# Patient Record
Sex: FEMALE | Race: WHITE | Employment: OTHER | URBAN - METROPOLITAN AREA
[De-identification: names, ages, dates, MRNs, and addresses within clinical notes are randomized per-mention and may not be internally consistent; named-entity substitution may affect disease eponyms.]

---

## 2017-01-04 ENCOUNTER — TRANSFERRED RECORDS (OUTPATIENT)
Dept: HEALTH INFORMATION MANAGEMENT | Facility: CLINIC | Age: 72
End: 2017-01-04

## 2017-02-02 ENCOUNTER — OFFICE VISIT (OUTPATIENT)
Dept: INTERNAL MEDICINE | Facility: CLINIC | Age: 72
End: 2017-02-02
Payer: COMMERCIAL

## 2017-02-02 VITALS
WEIGHT: 186 LBS | BODY MASS INDEX: 28.29 KG/M2 | SYSTOLIC BLOOD PRESSURE: 130 MMHG | TEMPERATURE: 97.5 F | OXYGEN SATURATION: 97 % | DIASTOLIC BLOOD PRESSURE: 80 MMHG | HEART RATE: 77 BPM

## 2017-02-02 DIAGNOSIS — R53.83 FATIGUE, UNSPECIFIED TYPE: ICD-10-CM

## 2017-02-02 DIAGNOSIS — R79.89 ELEVATED FERRITIN: ICD-10-CM

## 2017-02-02 DIAGNOSIS — Z01.818 PREOP GENERAL PHYSICAL EXAM: Primary | ICD-10-CM

## 2017-02-02 DIAGNOSIS — E55.9 VITAMIN D DEFICIENCY: ICD-10-CM

## 2017-02-02 LAB
ANION GAP SERPL CALCULATED.3IONS-SCNC: 6 MMOL/L (ref 3–14)
BUN SERPL-MCNC: 12 MG/DL (ref 7–30)
CALCIUM SERPL-MCNC: 9.6 MG/DL (ref 8.5–10.1)
CHLORIDE SERPL-SCNC: 104 MMOL/L (ref 94–109)
CO2 SERPL-SCNC: 32 MMOL/L (ref 20–32)
CREAT SERPL-MCNC: 0.74 MG/DL (ref 0.52–1.04)
ERYTHROCYTE [DISTWIDTH] IN BLOOD BY AUTOMATED COUNT: 13.3 % (ref 10–15)
FERRITIN SERPL-MCNC: 355 NG/ML (ref 8–252)
GFR SERPL CREATININE-BSD FRML MDRD: 78 ML/MIN/1.7M2
GLUCOSE SERPL-MCNC: 93 MG/DL (ref 70–99)
HCT VFR BLD AUTO: 40.4 % (ref 35–47)
HGB BLD-MCNC: 13.1 G/DL (ref 11.7–15.7)
IRON SATN MFR SERPL: 46 % (ref 15–46)
IRON SERPL-MCNC: 122 UG/DL (ref 35–180)
MCH RBC QN AUTO: 30.9 PG (ref 26.5–33)
MCHC RBC AUTO-ENTMCNC: 32.4 G/DL (ref 31.5–36.5)
MCV RBC AUTO: 95 FL (ref 78–100)
PLATELET # BLD AUTO: 205 10E9/L (ref 150–450)
POTASSIUM SERPL-SCNC: 3.9 MMOL/L (ref 3.4–5.3)
RBC # BLD AUTO: 4.24 10E12/L (ref 3.8–5.2)
SODIUM SERPL-SCNC: 142 MMOL/L (ref 133–144)
TIBC SERPL-MCNC: 265 UG/DL (ref 240–430)
WBC # BLD AUTO: 3.6 10E9/L (ref 4–11)

## 2017-02-02 PROCEDURE — 99214 OFFICE O/P EST MOD 30 MIN: CPT | Performed by: INTERNAL MEDICINE

## 2017-02-02 PROCEDURE — 85027 COMPLETE CBC AUTOMATED: CPT | Performed by: INTERNAL MEDICINE

## 2017-02-02 PROCEDURE — 82306 VITAMIN D 25 HYDROXY: CPT | Performed by: INTERNAL MEDICINE

## 2017-02-02 PROCEDURE — 93000 ELECTROCARDIOGRAM COMPLETE: CPT | Performed by: INTERNAL MEDICINE

## 2017-02-02 PROCEDURE — 82728 ASSAY OF FERRITIN: CPT | Performed by: INTERNAL MEDICINE

## 2017-02-02 PROCEDURE — 36415 COLL VENOUS BLD VENIPUNCTURE: CPT | Performed by: INTERNAL MEDICINE

## 2017-02-02 PROCEDURE — 83540 ASSAY OF IRON: CPT | Performed by: INTERNAL MEDICINE

## 2017-02-02 PROCEDURE — 83550 IRON BINDING TEST: CPT | Performed by: INTERNAL MEDICINE

## 2017-02-02 PROCEDURE — 80048 BASIC METABOLIC PNL TOTAL CA: CPT | Performed by: INTERNAL MEDICINE

## 2017-02-02 NOTE — PROGRESS NOTES
[address] [address]  [phone]  Dept: [phone]    PRE-OP EVALUATION:  Today's date: 2017    Justine Michelle (: 1945) presents for pre-operative evaluation assessment as requested by Dr. Pereyra.  She requires evaluation and anesthesia risk assessment prior to undergoing surgery/procedure for treatment of Brachial Plasty and blepharoplasty.  Proposed procedure: Brachial Plasty and Upper Eye lid Lift    Date of Surgery/ Procedure: 2017  Time of Surgery/ Procedure: 7am  Hospital/Surgical Facility: [facility]  Fax number for surgical facility: [phone]  Primary Physician: Adriana Hernandez  Type of Anesthesia Anticipated: to be determined    Patient has a Health Care Directive or Living Will:  NO    1. NO - Do you have a history of heart attack, stroke, stent, bypass or surgery on an artery in the head, neck, heart or legs?  2. NO - Do you ever have any pain or discomfort in your chest?  3. NO - Do you have a history of  Heart Failure?  4. NO - Are you troubled by shortness of breath when: walking on the level, up a slight hill or at night?  5. NO - Do you currently have a cold, bronchitis or other respiratory infection?  6. NO - Do you have a cough, shortness of breath or wheezing?  7. NO - Do you sometimes get pains in the calves of your legs when you walk?  8. NO - Do you or anyone in your family have previous history of blood clots?  9. NO - Do you or does anyone in your family have a serious bleeding problem such as prolonged bleeding following surgeries or cuts?  10. NO - Have you ever had problems with anemia or been told to take iron pills?  11. NO - Have you had any abnormal blood loss such as black, tarry or bloody stools, or abnormal vaginal bleeding?  12. NO - Have you ever had a blood transfusion?  13. NO - Have you or any of your relatives ever had problems with anesthesia?  14. NO - Do you have  sleep apnea, excessive snoring or daytime drowsiness?  15. NO - Do you have any prosthetic heart valves?  16. NO - Do you have prosthetic joints?  17. NO - Is there any chance that you may be pregnant?      HPI:                                                      Brief HPI related to upcoming procedure: Justine is undergoing surgery for removal of excess skin as a result of weight loss and also for blepharoplasty. She has done very well with weight loss losing over 70 pounds within the last 1-2 years.  She denies any current concerns.      See problem list for active medical problems.  Problems all longstanding and stable, except as noted/documented.  See ROS for pertinent symptoms related to these conditions.                                                                                                  .    MEDICAL HISTORY:                                                      Patient Active Problem List    Diagnosis Date Noted     Fatigue, unspecified type 11/07/2016     Priority: Medium     Anemia, unspecified type 11/07/2016     Priority: Medium     RECENT EPISODE OF DIVERTICULITIS       Numerous moles 08/04/2016     Priority: Medium     Myalgia  01/07/2016     Priority: Medium     Restless leg syndrome 01/07/2016     Priority: Medium     Overweight (BMI 25.0-29.9) 01/07/2016     Priority: Medium     Adult BMI 28.0-28.9 kg/sq m 10/01/2015     Priority: Medium     Pain in joint, pelvic region and thigh 04/01/2015     Priority: Medium     Advanced directives, counseling/discussion 01/20/2015     Priority: Medium     Advance Care Planning:   ACP Review and Resources Provided:  Reviewed chart for advance care plan.  Justine Michelle has no plan or code status on file. Discussed available resources and provided with information. Confirmed code status reflects current choices pending further ACP discussions.  Confirmed/documented designated decision maker(s). See permanent comments section of demographics in  clinical tab.   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 Provider to review and confirm.       Vitamin B12 deficiency without anemia 10/28/2014     Priority: Medium     Diagnosis updated by automated process. Provider to review and confirm.       Senile cataract 10/28/2014     Priority: Medium     Elevated ferritin level 10/28/2014     Priority: Medium     Vitamin D deficiency 10/28/2014     Priority: Medium     Palpitations 06/03/2014     Priority: Medium     Acute abdominal pain 04/16/2013     Priority: Medium     Diverticulitis of colon 04/16/2013     Priority: Medium     Myalgia and myositis 09/22/2006     Priority: Medium     Problem list name updated by automated process. Provider to review       Diverticulosis of large intestine      Priority: Medium     colonoscopy due in 2013  Problem list name updated by automated process. Provider to review       Absence of menstruation      Priority: Medium     not on HRT        Past Medical History   Diagnosis Date     Myalgia and myositis, unspecified      Absence of menstruation 2006     not on HRT     Diverticulosis of colon (without mention of hemorrhage)      colonoscopy due in 2013     Obesity, unspecified      Other and unspecified hyperlipidemia      Acute abdominal pain 4/16/2013     Diverticulitis of colon 4/16/2013     also 2003, 2007     Palpitations      Past Surgical History   Procedure Laterality Date     Appendectomy       Tubal ligation       Cholecystectomy, laporoscopic       Cholecystectomy, Laparoscopic     C nonspecific procedure  2006     nl     C nonspecific procedure  2005     nl     Hc colonoscopy thru stoma, diagnostic  2000     nl     Current Outpatient Prescriptions   Medication Sig Dispense Refill     B  Complex-C (SUPER B COMPLEX PO) Take by mouth daily       MAGNESIUM OXIDE PO Take 400 mg by mouth       cholecalciferol (VITAMIN D3) 28852 UNITS capsule Take 1 capsule (50,000 Units) by mouth every 14 days INDICATION:TAKE ONE CAP EVERY OTHER WEEK, FOR VITAMIN D SUPPLEMENTATION (1ST AND 15TH OF EACH MONTH), TAKE ONCE A MONTH DURING SUMMER MONTHS ONLY, RESTART TWICE MONTHLY DOSING IN THE FALL 40 capsule 0     phentermine 30 MG capsule Take 1 capsule (30 mg) by mouth every morning 90 capsule 0     FIBER PO        Cyanocobalamin (B-12 SUPER STRENGTH) 5000 MCG/ML LIQD Place 0.5 mLs under the tongue daily FOR VITAMIN B12 SUPPLEMENTATION, PLEASE PLACE UNDER THE TONGUE 2 Bottle PRN     calcium carbonate (TUMS CALCIUM FOR LIFE BONE) 750 MG CHEW Take 1 tablet (750 mg) by mouth 2 times daily (with meals) (Patient taking differently: Take 1,500 mg by mouth 2 times daily (with meals) ) 90 tablet      Psyllium (METAMUCIL PO) Take 1 tsp. by mouth daily       OTC products: None, except as noted above and no recent use of OTC ASA, NSAIDS or Steroids    Allergies   Allergen Reactions     Augmentin Rash     Bees      hives      Latex Allergy: NO    Social History   Substance Use Topics     Smoking status: Never Smoker      Smokeless tobacco: Never Used     Alcohol Use: Yes      Comment: minimal - on holidays     History   Drug Use No       REVIEW OF SYSTEMS:                                                    Constitutional, neuro, ENT, endocrine, pulmonary, cardiac, gastrointestinal, genitourinary, musculoskeletal, integument and psychiatric systems are negative, except as otherwise noted.    EXAM:                                                    /80 mmHg  Pulse 77  Temp(Src) 97.5  F (36.4  C) (Oral)  Wt 186 lb (84.369 kg)  SpO2 97%    GENERAL APPEARANCE: healthy, alert and no distress     EYES: EOMI,- PERRL     HENT: ear canals and TM's normal and nose and mouth without ulcers or lesions     NECK: no adenopathy, no  asymmetry, masses, or scars and thyroid normal to palpation     RESP: lungs clear to auscultation - no rales, rhonchi or wheezes     CV: regular rates and rhythm, normal S1 S2, no S3 or S4 and no murmur, click or rub -     ABDOMEN:  soft, nontender, no HSM or masses and bowel sounds normal     MS: extremities normal- no gross deformities noted, no evidence of inflammation in joints, FROM in all extremities.     SKIN: no suspicious lesions or rashes     NEURO: Normal strength and tone, sensory exam grossly normal, mentation intact and speech normal     PSYCH: mentation appears normal. and affect normal/bright     LYMPHATICS: No axillary, cervical, inguinal, or supraclavicular nodes    DIAGNOSTICS:                                                      EKG: appears normal, NSR, normal axis, normal intervals, no acute ST/T changes c/w ischemia, no LVH by voltage criteria, unchanged from previous tracings  Serum Potassium  Serum Creatinine  Labs Drawn and in Process:   Unresulted Labs Ordered in the Past 30 Days of this Admission     No orders found from 12/5/2016 to 2/3/2017.          Recent Labs   Lab Test  11/07/16   1211  10/04/16   1552   05/02/16   0912  05/02/14   0859   HGB  13.0  11.6*   < >  12.3   --    PLT  181  227   < >  191   --    NA   --    --    --   142  143   POTASSIUM   --    --    --   4.0  3.8   CR   --    --    --   0.78  0.86   A1C   --    --    --    --   5.7    < > = values in this interval not displayed.        IMPRESSION:                                                    Reason for surgery/procedure: Upper arm plasty procedure and blepharoplasty  Diagnosis/reason for consult: OBTAIN PREOPERATIVE MEDICAL CLEARANCE AND TO ASSESS CARDIAC RISK    The proposed surgical procedure is considered INTERMEDIATE risk.    REVISED CARDIAC RISK INDEX  The patient has the following serious cardiovascular risks for perioperative complications such as (MI, PE, VFib and 3  AV Block):  No serious cardiac  risks  INTERPRETATION: 1 risks: Class II (low risk - 0.9% complication rate)    The patient has the following additional risks for perioperative complications:  No identified additional risks      ICD-10-CM    1. Preop general physical exam Z01.818 EKG 12-lead complete w/read - Clinics     CBC with platelets     Basic metabolic panel   2. Vitamin D deficiency E55.9 Vitamin D Deficiency   3. Fatigue, unspecified type R53.83    4. Elevated ferritin R79.89 Ferritin     Iron and iron binding capacity       RECOMMENDATIONS:                                                          --Patient is to take all scheduled medications on the day of surgery EXCEPT for modifications for vitamins    APPROVAL GIVEN to proceed with proposed procedure, without further diagnostic evaluation     DIAGNOSIS/PLAN:     ICD-10-CM    1. Preop general physical exam Z01.818 EKG 12-lead complete w/read - Clinics     CBC with platelets     Basic metabolic panel   2. Vitamin D deficiency E55.9 Vitamin D Deficiency   3. Fatigue, unspecified type R53.83    4. Elevated ferritin R79.89 Ferritin     Iron and iron binding capacity       SIGNIFICANT ISSUES RE The primary encounter diagnosis was Preop general physical exam. Diagnoses of Vitamin D deficiency, Fatigue, unspecified type, and Elevated ferritin were also pertinent to this visit. AS NOTED AND ADDRESSED ABOVE   MEDS AND AND LABS AS ORDERED TO ADDRESS PREVIOUS AND CURRENT ABNORMAL INDICES    SEE PATIENT INSTRUCTION SECTION FOR FOLLOW UP PLAN    Justine IS TO CONTINUE OTHER TREATMENT REGIMEN/PLANS EXCEPT AS INDICATED    COMPLIANCE WITH MEDICATIONS DIET AND EXERCISE PLANS ENCOURAGED    DISCONTINUED MEDS:  Medications Discontinued During This Encounter   Medication Reason     Ascorbic Acid Buffered (VITAMIN C EFFERVESCENT) PDEF        CURRENT MED LIST WITH CHANGES AS NOTED BELOW:  Current Outpatient Prescriptions   Medication Sig Dispense Refill     B Complex-C (SUPER B COMPLEX PO) Take by mouth  daily       MAGNESIUM OXIDE PO Take 400 mg by mouth       cholecalciferol (VITAMIN D3) 90651 UNITS capsule Take 1 capsule (50,000 Units) by mouth every 14 days INDICATION:TAKE ONE CAP EVERY OTHER WEEK, FOR VITAMIN D SUPPLEMENTATION (1ST AND 15TH OF EACH MONTH), TAKE ONCE A MONTH DURING SUMMER MONTHS ONLY, RESTART TWICE MONTHLY DOSING IN THE FALL 40 capsule 0     phentermine 30 MG capsule Take 1 capsule (30 mg) by mouth every morning 90 capsule 0     FIBER PO        Cyanocobalamin (B-12 SUPER STRENGTH) 5000 MCG/ML LIQD Place 0.5 mLs under the tongue daily FOR VITAMIN B12 SUPPLEMENTATION, PLEASE PLACE UNDER THE TONGUE 2 Bottle PRN     calcium carbonate (TUMS CALCIUM FOR LIFE BONE) 750 MG CHEW Take 1 tablet (750 mg) by mouth 2 times daily (with meals) (Patient taking differently: Take 1,500 mg by mouth 2 times daily (with meals) ) 90 tablet      Psyllium (METAMUCIL PO) Take 1 tsp. by mouth daily           Signed Electronically by: Adriana Hernandez MD    Copy of this evaluation report is provided to requesting physician.    [place] Preop Guidelines

## 2017-02-02 NOTE — MR AVS SNAPSHOT
After Visit Summary   2/2/2017    Justine Micehlle    MRN: [id number]           Patient Information     Date Of Birth          1945        Visit Information        Provider Department      2/2/2017 11:30 AM Adriana Hernandez MD [facility]        Today's Diagnoses     Preop general physical exam    -  1     Vitamin D deficiency         Fatigue, unspecified type         Elevated ferritin           Care Instructions      FOLLOW UP AS PREVIOUSLY SCHEDULED      Before Your Surgery      Call your surgeon if there is any change in your health. This includes signs of a cold or flu (such as a sore throat, runny nose, cough, rash or fever).    Do not smoke, drink alcohol or take over the counter medicine (unless your surgeon or primary care doctor tells you to) for the 24 hours before and after surgery.    If you take prescribed drugs: Follow your doctor s orders about which medicines to take and which to stop until after surgery.    Eating and drinking prior to surgery: follow the instructions from your surgeon    Take a shower or bath the night before surgery. Use the soap your surgeon gave you to gently clean your skin. If you do not have soap from your surgeon, use your regular soap. Do not shave or scrub the surgery site.  Wear clean pajamas and have clean sheets on your bed.         Follow-ups after your visit        Your next 10 appointments already scheduled     Feb 02, 2017  1:30 PM   LAB with [facility]   [facility] ([facility])    [address] [address] [phone]           Patient must bring picture ID.  Patient should be prepared to give a urine specimen  Please do not eat 10-12 hours before your appointment if you are coming in fasting for labs on lipids, cholesterol, or glucose (sugar).  Pregnant women should follow their Care Team instructions. Water with medications is okay. Do not drink coffee or other fluids.   If  you have concerns about taking  your medications, please ask at office or if scheduling via [employer], send a message by clicking on Secure Messaging, Message Your Care Team.            Feb 07, 2017 10:30 AM   [facility]    [address] [id number] [phone]           Bring a current list of meds and any records pertaining to this visit.  For Physicals, please bring immunization records and any forms needing to be filled out.  Please arrive 10 minutes early to complete paperwork.              Who to contact     If you have questions or need follow up information about today's clinic visit or your schedule please contact [facility] directly at [phone].  Normal or non-critical lab and imaging results will be communicated to you by [employer], letter or phone within 4 business days after the clinic has received the results. If you do not hear from us within 7 days, please contact the clinic through [employer] or phone. If you have a critical or abnormal lab result, we will notify you by phone as soon as possible.  Submit refill requests through [employer] or call your pharmacy and they will forward the refill request to us. Please allow 3 business days for your refill to be completed.          Additional Information About Your Visit        [employer] Information     [employer] gives you secure access to your electronic health record. If you see a primary care provider, you can also send messages to your care team and make appointments. If you have questions, please call your primary care clinic.  If you do not have a primary care provider, please call [phone] and they will assist you.        Care EveryWhere ID     This is your Care EveryWhere ID. This could be used by other organizations to access your [facility] medical records  [id number]        Your Vitals Were      Pulse Temperature Pulse Oximetry             77 97.5  F (36.4  C) (Oral) 97%          Blood Pressure from Last 3 Encounters:   02/02/17 130/80   11/07/16 122/78   10/04/16 110/68    Weight from Last 3 Encounters:   02/02/17 186 lb (84.369 kg)   11/07/16 181 lb 3.2 oz (82.192 kg)   10/04/16 183 lb (83.008 kg)              We Performed the Following     Basic metabolic panel     CBC with platelets     EKG 12-lead complete w/read - Clinics     Ferritin     Iron and iron binding capacity     Vitamin D Deficiency          Today's Medication Changes          These changes are accurate as of: 2/2/17 12:34 PM.  If you have any questions, ask your nurse or doctor.               These medicines have changed or have updated prescriptions.        Dose/Directions    calcium carbonate 750 MG Chew   Commonly known as:  TUMS CALCIUM FOR LIFE BONE   This may have changed:  how much to take   Used for:  Vitamin D deficiency        Dose:  750 mg   Take 1 tablet (750 mg) by mouth 2 times daily (with meals)   Quantity:  90 tablet   Refills:  0         Stop taking these medicines if you haven't already. Please contact your care team if you have questions.     VITAMIN C EFFERVESCENT Pdef   Stopped by:  Adriana Hernandez MD                    Primary Care Provider Office Phone # Fax #    Adriana Hernandez [phone]       [facility] [address] [facility] [address]        Thank you!     Thank you for choosing [facility]  for your care. Our goal is always to provide you with excellent care. Hearing back from our patients is one way we can continue to improve our services. Please take a few minutes to complete the written survey that you may receive in the mail after your visit with us. Thank you!             Your Updated Medication List - Protect others around you: Learn how to safely use, store and throw away your medicines at www.disposemymeds.org.          This list is accurate  as of: 2/2/17 12:34 PM.  Always use your most recent med list.                   Brand Name Dispense Instructions for use    calcium carbonate 750 MG Chew    TUMS CALCIUM FOR LIFE BONE    90 tablet    Take 1 tablet (750 mg) by mouth 2 times daily (with meals)       cholecalciferol 16029 UNITS capsule    VITAMIN D3    40 capsule    Take 1 capsule (50,000 Units) by mouth every 14 days INDICATION:TAKE ONE CAP EVERY OTHER WEEK, FOR VITAMIN D SUPPLEMENTATION (1ST AND 15TH OF EACH MONTH), TAKE ONCE A MONTH DURING SUMMER MONTHS ONLY, RESTART TWICE MONTHLY DOSING IN THE FALL       Cyanocobalamin 5000 MCG/ML Liqd    B-12 SUPER STRENGTH    2 Bottle    Place 0.5 mLs under the tongue daily FOR VITAMIN B12 SUPPLEMENTATION, PLEASE PLACE UNDER THE TONGUE       FIBER PO          MAGNESIUM OXIDE PO      Take 400 mg by mouth       METAMUCIL PO      Take 1 tsp. by mouth daily       phentermine 30 MG capsule     90 capsule    Take 1 capsule (30 mg) by mouth every morning       SUPER B COMPLEX PO      Take by mouth daily

## 2017-02-02 NOTE — NURSING NOTE
"Chief Complaint   Patient presents with     Pre-Op Exam       Initial /80 mmHg  Pulse 77  Temp(Src) 97.5  F (36.4  C) (Oral)  Wt 186 lb (84.369 kg)  SpO2 97% Estimated body mass index is 28.29 kg/(m^2) as calculated from the following:    Height as of 9/1/16: 5' 8\" (1.727 m).    Weight as of this encounter: 186 lb (84.369 kg).  BP completed using cuff size: regular    "

## 2017-02-02 NOTE — PATIENT INSTRUCTIONS
FOLLOW UP AS PREVIOUSLY SCHEDULED      Before Your Surgery      Call your surgeon if there is any change in your health. This includes signs of a cold or flu (such as a sore throat, runny nose, cough, rash or fever).    Do not smoke, drink alcohol or take over the counter medicine (unless your surgeon or primary care doctor tells you to) for the 24 hours before and after surgery.    If you take prescribed drugs: Follow your doctor s orders about which medicines to take and which to stop until after surgery.    Eating and drinking prior to surgery: follow the instructions from your surgeon    Take a shower or bath the night before surgery. Use the soap your surgeon gave you to gently clean your skin. If you do not have soap from your surgeon, use your regular soap. Do not shave or scrub the surgery site.  Wear clean pajamas and have clean sheets on your bed.

## 2017-02-03 LAB — DEPRECATED CALCIDIOL+CALCIFEROL SERPL-MC: 54 UG/L (ref 20–75)

## 2017-02-06 ENCOUNTER — TELEPHONE (OUTPATIENT)
Dept: INTERNAL MEDICINE | Facility: CLINIC | Age: 72
End: 2017-02-06

## 2017-02-06 NOTE — TELEPHONE ENCOUNTER
[facility] called and would like preop faxed, can we have the office visit closed so that we can fax over information.    Fax number [phone]   Phone number incase any questions- [phone]     ATTN: Radha

## 2017-02-07 ENCOUNTER — OFFICE VISIT (OUTPATIENT)
Dept: INTERNAL MEDICINE | Facility: CLINIC | Age: 72
End: 2017-02-07
Payer: COMMERCIAL

## 2017-02-07 VITALS
HEIGHT: 68 IN | BODY MASS INDEX: 28.02 KG/M2 | HEART RATE: 75 BPM | SYSTOLIC BLOOD PRESSURE: 122 MMHG | WEIGHT: 184.9 LBS | TEMPERATURE: 98.1 F | DIASTOLIC BLOOD PRESSURE: 76 MMHG | OXYGEN SATURATION: 98 %

## 2017-02-07 DIAGNOSIS — M25.561 CHRONIC PAIN OF RIGHT KNEE: ICD-10-CM

## 2017-02-07 DIAGNOSIS — E66.3 OVERWEIGHT (BMI 25.0-29.9): Primary | ICD-10-CM

## 2017-02-07 DIAGNOSIS — G89.29 CHRONIC PAIN OF RIGHT KNEE: ICD-10-CM

## 2017-02-07 PROCEDURE — 99214 OFFICE O/P EST MOD 30 MIN: CPT | Performed by: INTERNAL MEDICINE

## 2017-02-07 RX ORDER — PHENTERMINE HYDROCHLORIDE 30 MG/1
30 CAPSULE ORAL EVERY OTHER DAY
Qty: 45 CAPSULE | Refills: 0 | Status: SHIPPED | OUTPATIENT
Start: 2017-02-07 | End: 2017-05-04

## 2017-02-07 NOTE — TELEPHONE ENCOUNTER
The the only element keeping the chart open is the fax number for the surgical facility. Please fill this in and fax as requested.

## 2017-02-07 NOTE — PROGRESS NOTES
Quick Note:    Dear Justine,   Your recent test results were reviewed and are within acceptable limits, except for your iron level. We will revisit the issue at your next visit. Please continue with other treatment, and follow up plans as discussed and do not hesitate to contact me with any questions or concerns.  Stay healthy!    Regards,  Dr. Hernandez  [facility]    ______

## 2017-02-07 NOTE — NURSING NOTE
"Chief Complaint   Patient presents with     Weight Check       Initial /76 mmHg  Pulse 75  Temp(Src) 98.1  F (36.7  C) (Oral)  Ht 5' 8\" (1.727 m)  Wt 184 lb 14.4 oz (83.87 kg)  BMI 28.12 kg/m2  SpO2 98% Estimated body mass index is 28.12 kg/(m^2) as calculated from the following:    Height as of this encounter: 5' 8\" (1.727 m).    Weight as of this encounter: 184 lb 14.4 oz (83.87 kg).  Medication Reconciliation: complete    "

## 2017-02-07 NOTE — PATIENT INSTRUCTIONS
** FOLLOW UP PLAN**:    PLEASE SCHEDULE OFFICE VISIT 3 MONTHS FROM TODAY FOR WEIGHT AND MEDICATION CHECK      YOU MAY CONTACT THE CLINIC IF ANY QUESTIONS OR CONCERNS -[phone] OR VIA [employer]

## 2017-02-07 NOTE — MR AVS SNAPSHOT
After Visit Summary   2/7/2017    Justine Michelle    MRN: [id number]           Patient Information     Date Of Birth          1945        Visit Information        Provider Department      2/7/2017 10:30 AM Adriana Hernandez MD [facility]        Today's Diagnoses     Overweight (BMI 25.0-29.9)    -  1     Chronic pain of right knee           Care Instructions    ** FOLLOW UP PLAN**:    PLEASE SCHEDULE OFFICE VISIT 3 MONTHS FROM TODAY FOR WEIGHT AND MEDICATION CHECK      YOU MAY CONTACT THE CLINIC IF ANY QUESTIONS OR CONCERNS -[phone] OR VIA [employer]                   Follow-ups after your visit        Who to contact     If you have questions or need follow up information about today's clinic visit or your schedule please contact [facility] directly at [phone].  Normal or non-critical lab and imaging results will be communicated to you by NIMBOXXhart, letter or phone within 4 business days after the clinic has received the results. If you do not hear from us within 7 days, please contact the clinic through NIMBOXXhart or phone. If you have a critical or abnormal lab result, we will notify you by phone as soon as possible.  Submit refill requests through [employer]ient or call your pharmacy and they will forward the refill request to us. Please allow 3 business days for your refill to be completed.          Additional Information About Your Visit        MyChart Information     [employer] gives you secure access to your electronic health record. If you see a primary care provider, you can also send messages to your care team and make appointments. If you have questions, please call your primary care clinic.  If you do not have a primary care provider, please call [phone] and they will assist you.        Care EveryWhere ID     This is your Care EveryWhere ID. This could be used by other organizations to access your [facility]  "records  [id number]        Your Vitals Were     Pulse Temperature Height BMI (Body Mass Index) Pulse Oximetry       75 98.1  F (36.7  C) (Oral) 5' 8\" (1.727 m) 28.12 kg/m2 98%        Blood Pressure from Last 3 Encounters:   02/07/17 122/76   02/02/17 130/80   11/07/16 122/78    Weight from Last 3 Encounters:   02/07/17 184 lb 14.4 oz (83.87 kg)   02/02/17 186 lb (84.369 kg)   11/07/16 181 lb 3.2 oz (82.192 kg)              Today, you had the following     No orders found for display         Today's Medication Changes          These changes are accurate as of: 2/7/17 11:57 AM.  If you have any questions, ask your nurse or doctor.               Start taking these medicines.        Dose/Directions    Turmeric Curcumin 500 MG Caps   Used for:  Chronic pain of right knee   Started by:  Adriana Hernandez MD        Dose:  1 capsule   Take 1 capsule by mouth 2 times daily INDICATION: ARTHRITIS   Quantity:  100 capsule   Refills:  PRN         These medicines have changed or have updated prescriptions.        Dose/Directions    calcium carbonate 750 MG Chew   Commonly known as:  TUMS CALCIUM FOR LIFE BONE   This may have changed:  how much to take   Used for:  Vitamin D deficiency        Dose:  750 mg   Take 1 tablet (750 mg) by mouth 2 times daily (with meals)   Quantity:  90 tablet   Refills:  0       phentermine 30 MG capsule   This may have changed:  when to take this   Used for:  Overweight (BMI 25.0-29.9)   Changed by:  Adriana Hernandez MD        Dose:  30 mg   Take 1 capsule (30 mg) by mouth every other day   Quantity:  45 capsule   Refills:  0            Where to get your medicines      Some of these will need a paper prescription and others can be bought over the counter.  Ask your nurse if you have questions.     Bring a paper prescription for each of these medications    - phentermine 30 MG capsule    You don't need a prescription for these medications    - Turmeric Curcumin 500 MG Caps             Primary " Care Provider Office Phone # Fax #    Adriana Hernandez [phone]       [facility] [address] [facility] [address]        Thank you!     Thank you for choosing [facility]  for your care. Our goal is always to provide you with excellent care. Hearing back from our patients is one way we can continue to improve our services. Please take a few minutes to complete the written survey that you may receive in the mail after your visit with us. Thank you!             Your Updated Medication List - Protect others around you: Learn how to safely use, store and throw away your medicines at www.disposemymeds.org.          This list is accurate as of: 2/7/17 11:57 AM.  Always use your most recent med list.                   Brand Name Dispense Instructions for use    calcium carbonate 750 MG Chew    TUMS CALCIUM FOR LIFE BONE    90 tablet    Take 1 tablet (750 mg) by mouth 2 times daily (with meals)       cholecalciferol 03611 UNITS capsule    VITAMIN D3    40 capsule    Take 1 capsule (50,000 Units) by mouth every 14 days INDICATION:TAKE ONE CAP EVERY OTHER WEEK, FOR VITAMIN D SUPPLEMENTATION (1ST AND 15TH OF EACH MONTH), TAKE ONCE A MONTH DURING SUMMER MONTHS ONLY, RESTART TWICE MONTHLY DOSING IN THE FALL       Cyanocobalamin 5000 MCG/ML Liqd    B-12 SUPER STRENGTH    2 Bottle    Place 0.5 mLs under the tongue daily FOR VITAMIN B12 SUPPLEMENTATION, PLEASE PLACE UNDER THE TONGUE       FIBER PO          MAGNESIUM OXIDE PO      Take 400 mg by mouth At Bedtime       METAMUCIL PO      Take 1 tsp. by mouth daily       phentermine 30 MG capsule     45 capsule    Take 1 capsule (30 mg) by mouth every other day       SUPER B COMPLEX PO      Take by mouth daily       Turmeric Curcumin 500 MG Caps     100 capsule    Take 1 capsule by mouth 2 times daily INDICATION: ARTHRITIS

## 2017-02-07 NOTE — PROGRESS NOTES
"  SUBJECTIVE:                                                    Justine Michelle is a 71 year old female who presents to clinic today for the following health issues:      Medication Followup of PHENTERMINE    Taking Medication as prescribed: yes    Side Effects:  None    Medication Helping Symptoms:  yes      Justine was scheduled for surgery for a blepharoplasty and removal of excess skin from the underside of the arms but surgery was postponed to enable her to have time off of phentermine before anesthesia. She is doing well at this time she has cut down on the causing her diet and has lost a few pounds since her last visit 2 pounds to be precise. She denies chest pain shortness of breath or any other constitutional symptoms at this time. She is up-to-date with regards to her health maintenance.  She has had R knee pain that was unresponsive to steroid injection. She has been getting Prolozone which is a homeopathic treatment for osteoarthritis that is minimally invasive and comprises injecting a mixture of oxygen and also on into the knee or the affected joint.  No other major issues were addressed today.    Problem list and histories reviewed & adjusted, as indicated.  Additional history: as documented    Labs reviewed in EPIC    ROS:  14 point ROS negative except as above      OBJECTIVE:                                                    /76  Pulse 75  Temp 98.1  F (36.7  C) (Oral)  Ht 5' 8\" (1.727 m)  Wt 184 lb 14.4 oz (83.9 kg)  SpO2 98%  BMI 28.11 kg/m2  Body mass index is 28.11 kg/(m^2).  GENERAL: healthy, alert and no distress  EYES: Eyes grossly normal to inspection, PERRL and conjunctivae and sclerae normal  NECK: no adenopathy, no asymmetry, masses, or scars and thyroid normal to palpation  RESP: lungs clear to auscultation - no rales, rhonchi or wheezes  CV: regular rate and rhythm, normal S1 S2, no S3 or S4, no murmur, click or rub, no peripheral edema and peripheral pulses " strong  ABDOMEN: soft, nontender, no hepatosplenomegaly, no masses and bowel sounds normal  MS: no gross musculoskeletal defects noted, no edema    Diagnostic Test Results:  Results for orders placed or performed in visit on 02/02/17   Vitamin D Deficiency   Result Value Ref Range    Vitamin D Deficiency screening 54 20 - 75 ug/L   CBC with platelets   Result Value Ref Range    WBC 3.6 (L) 4.0 - 11.0 10e9/L    RBC Count 4.24 3.8 - 5.2 10e12/L    Hemoglobin 13.1 11.7 - 15.7 g/dL    Hematocrit 40.4 35.0 - 47.0 %    MCV 95 78 - 100 fl    MCH 30.9 26.5 - 33.0 pg    MCHC 32.4 31.5 - 36.5 g/dL    RDW 13.3 10.0 - 15.0 %    Platelet Count 205 150 - 450 10e9/L   Basic metabolic panel   Result Value Ref Range    Sodium 142 133 - 144 mmol/L    Potassium 3.9 3.4 - 5.3 mmol/L    Chloride 104 94 - 109 mmol/L    Carbon Dioxide 32 20 - 32 mmol/L    Anion Gap 6 3 - 14 mmol/L    Glucose 93 70 - 99 mg/dL    Urea Nitrogen 12 7 - 30 mg/dL    Creatinine 0.74 0.52 - 1.04 mg/dL    GFR Estimate 78 >60 mL/min/1.7m2    GFR Estimate If Black >90   GFR Calc   >60 mL/min/1.7m2    Calcium 9.6 8.5 - 10.1 mg/dL   Ferritin   Result Value Ref Range    Ferritin 355 (H) 8 - 252 ng/mL   Iron and iron binding capacity   Result Value Ref Range    Iron 122 35 - 180 ug/dL    Iron Binding Cap 265 240 - 430 ug/dL    Iron Saturation Index 46 15 - 46 %        ASSESSMENT/PLAN:                                                      DIAGNOSIS/PLAN:     ICD-10-CM    1. Overweight (BMI 25.0-29.9) E66.3 phentermine 30 MG capsule   2. Chronic pain of right knee M25.561 Turmeric Curcumin 500 MG CAPS    G89.29        SIGNIFICANT ISSUES RE The primary encounter diagnosis was Overweight (BMI 25.0-29.9). A diagnosis of Chronic pain of right knee was also pertinent to this visit. AS NOTED AND ADDRESSED ABOVE   MEDS AND AND LABS AS ORDERED TO ADDRESS PREVIOUS AND CURRENT ABNORMAL INDICES    SEE PATIENT INSTRUCTION SECTION FOR FOLLOW UP PLAN    Justine RODRIGUEZ TO  CONTINUE OTHER TREATMENT REGIMEN/PLANS EXCEPT AS INDICATED    COMPLIANCE WITH MEDICATIONS DIET AND EXERCISE PLANS ENCOURAGED    DISCONTINUED MEDS:  Medications Discontinued During This Encounter   Medication Reason     phentermine 30 MG capsule Reorder       CURRENT MED LIST WITH CHANGES AS NOTED BELOW:  Current Outpatient Prescriptions   Medication Sig Dispense Refill     phentermine 30 MG capsule Take 1 capsule (30 mg) by mouth every other day 45 capsule 0     Turmeric Curcumin 500 MG CAPS Take 1 capsule by mouth 2 times daily INDICATION: ARTHRITIS 100 capsule PRN     B Complex-C (SUPER B COMPLEX PO) Take by mouth daily       MAGNESIUM OXIDE PO Take 400 mg by mouth At Bedtime        cholecalciferol (VITAMIN D3) 65601 UNITS capsule Take 1 capsule (50,000 Units) by mouth every 14 days INDICATION:TAKE ONE CAP EVERY OTHER WEEK, FOR VITAMIN D SUPPLEMENTATION (1ST AND 15TH OF EACH MONTH), TAKE ONCE A MONTH DURING SUMMER MONTHS ONLY, RESTART TWICE MONTHLY DOSING IN THE FALL 40 capsule 0     FIBER PO        Cyanocobalamin (B-12 SUPER STRENGTH) 5000 MCG/ML LIQD Place 0.5 mLs under the tongue daily FOR VITAMIN B12 SUPPLEMENTATION, PLEASE PLACE UNDER THE TONGUE 2 Bottle PRN     calcium carbonate (TUMS CALCIUM FOR LIFE BONE) 750 MG CHEW Take 1 tablet (750 mg) by mouth 2 times daily (with meals) (Patient taking differently: Take 1,500 mg by mouth 2 times daily (with meals) ) 90 tablet      Psyllium (METAMUCIL PO) Take 1 tsp. by mouth daily           Office visit time > 25 mins, greater than 50% of which was spent obtaining history, reviewing medications, counseling re compliance with treatment plan, discussion of treatment, follow up plans, and coordination of care.     Patient Instructions   ** FOLLOW UP PLAN**:    PLEASE SCHEDULE OFFICE VISIT 3 MONTHS FROM TODAY FOR WEIGHT AND MEDICATION CHECK      YOU MAY CONTACT THE CLINIC IF ANY QUESTIONS OR CONCERNS -[phone] OR VIA MCKINLEY Hernandez  MD  [facility]

## 2017-05-04 ENCOUNTER — OFFICE VISIT (OUTPATIENT)
Dept: INTERNAL MEDICINE | Facility: CLINIC | Age: 72
End: 2017-05-04
Payer: COMMERCIAL

## 2017-05-04 VITALS
WEIGHT: 183 LBS | HEART RATE: 74 BPM | BODY MASS INDEX: 27.83 KG/M2 | TEMPERATURE: 98.2 F | SYSTOLIC BLOOD PRESSURE: 110 MMHG | DIASTOLIC BLOOD PRESSURE: 70 MMHG

## 2017-05-04 DIAGNOSIS — E53.8 VITAMIN B12 DEFICIENCY WITHOUT ANEMIA: ICD-10-CM

## 2017-05-04 DIAGNOSIS — E55.9 VITAMIN D DEFICIENCY: ICD-10-CM

## 2017-05-04 DIAGNOSIS — R79.89 ELEVATED FERRITIN LEVEL: ICD-10-CM

## 2017-05-04 DIAGNOSIS — Z13.6 CARDIOVASCULAR SCREENING; LDL GOAL LESS THAN 160: ICD-10-CM

## 2017-05-04 DIAGNOSIS — E66.3 OVERWEIGHT (BMI 25.0-29.9): Primary | ICD-10-CM

## 2017-05-04 PROCEDURE — 99214 OFFICE O/P EST MOD 30 MIN: CPT | Performed by: INTERNAL MEDICINE

## 2017-05-04 RX ORDER — PHENTERMINE HYDROCHLORIDE 30 MG/1
30 CAPSULE ORAL EVERY OTHER DAY
Qty: 90 CAPSULE | Refills: 0 | Status: SHIPPED | OUTPATIENT
Start: 2017-05-04 | End: 2017-09-05

## 2017-05-04 NOTE — NURSING NOTE
"  Chief Complaint   Patient presents with     Recheck Medication     Weight Loss       Initial /70  Pulse 74  Temp 98.2  F (36.8  C) (Oral)  Wt 183 lb (83 kg)  BMI 27.83 kg/m2 Estimated body mass index is 27.83 kg/(m^2) as calculated from the following:    Height as of 2/7/17: 5' 8\" (1.727 m).    Weight as of this encounter: 183 lb (83 kg).  Blood pressure completed using cuff size: regular    "

## 2017-05-04 NOTE — PATIENT INSTRUCTIONS
** FOLLOW UP PLAN**:    PLEASE SCHEDULE OFFICE VISIT 3 MONTHS FROM TODAY TO FOLLOW UP ON     BE SURE TO SCHEDULE YOUR LAB DRAW APPOINTMENT FOR AT LEAST 1 WEEK BEFORE YOUR NEXT VISIT      YOU MAY CONTACT THE CLINIC IF ANY QUESTIONS OR CONCERNS -[phone] OR VIA [employer]

## 2017-05-04 NOTE — PROGRESS NOTES
SUBJECTIVE:                                                    Justine Michelle is a 71 year old female who presents to clinic today for the following health issues:    Weight loss - she is continuing to do well and has overall lost over 75 pounds over the past 2 years or so. She continues to exercise. She is status post bilateral brachioplasty and blepharoplasty and she is doing well postoperatively. She also reports improvement with Prolozone treatment. I cannot endorse or dismiss this procedure but Justine seems to be getting some benefit from it. She is currently wearing a knee brace when exercising and walking on uneven ground for knee stability. She reports that her pain has decreased from an 8-9/10 to about a 1-2/10.    Medication Followup of Weight loss    Taking Medication as prescribed: yes    Side Effects:  None    Medication Helping Symptoms:  yes       Other significant issues as outlined and addressed in the plan section of this note.  She denies any chest pain or shortness of breath.    Problem list and histories reviewed & adjusted, as indicated.  Additional history: as documented    Labs reviewed in EPIC    Reviewed and updated as needed this visit by clinical staff  Tobacco  Allergies  Meds  Med Hx  Surg Hx  Fam Hx  Soc Hx      Reviewed and updated as needed this visit by Provider         ROS:  14 point ROS negative except as above      OBJECTIVE:                                                    /70  Pulse 74  Temp 98.2  F (36.8  C) (Oral)  Wt 183 lb (83 kg)  BMI 27.83 kg/m2  Body mass index is 27.83 kg/(m^2).  GENERAL: healthy, alert and no distress  NECK: no adenopathy, no asymmetry, masses, or scars and thyroid normal to palpation  RESP: lungs clear to auscultation - no rales, rhonchi or wheezes  CV: regular rate and rhythm, normal S1 S2, no S3 or S4, no murmur, click or rub, no peripheral edema and peripheral pulses strong  MS: no gross musculoskeletal defects noted,  no edema    Diagnostic Test Results:  Results for orders placed or performed in visit on 02/02/17   Vitamin D Deficiency   Result Value Ref Range    Vitamin D Deficiency screening 54 20 - 75 ug/L   CBC with platelets   Result Value Ref Range    WBC 3.6 (L) 4.0 - 11.0 10e9/L    RBC Count 4.24 3.8 - 5.2 10e12/L    Hemoglobin 13.1 11.7 - 15.7 g/dL    Hematocrit 40.4 35.0 - 47.0 %    MCV 95 78 - 100 fl    MCH 30.9 26.5 - 33.0 pg    MCHC 32.4 31.5 - 36.5 g/dL    RDW 13.3 10.0 - 15.0 %    Platelet Count 205 150 - 450 10e9/L   Basic metabolic panel   Result Value Ref Range    Sodium 142 133 - 144 mmol/L    Potassium 3.9 3.4 - 5.3 mmol/L    Chloride 104 94 - 109 mmol/L    Carbon Dioxide 32 20 - 32 mmol/L    Anion Gap 6 3 - 14 mmol/L    Glucose 93 70 - 99 mg/dL    Urea Nitrogen 12 7 - 30 mg/dL    Creatinine 0.74 0.52 - 1.04 mg/dL    GFR Estimate 78 >60 mL/min/1.7m2    GFR Estimate If Black >90   GFR Calc   >60 mL/min/1.7m2    Calcium 9.6 8.5 - 10.1 mg/dL   Ferritin   Result Value Ref Range    Ferritin 355 (H) 8 - 252 ng/mL   Iron and iron binding capacity   Result Value Ref Range    Iron 122 35 - 180 ug/dL    Iron Binding Cap 265 240 - 430 ug/dL    Iron Saturation Index 46 15 - 46 %        ASSESSMENT/PLAN:                                                      DIAGNOSIS/PLAN:     ICD-10-CM    1. Overweight (BMI 25.0-29.9) E66.3 phentermine 30 MG capsule       SIGNIFICANT ISSUES RE The encounter diagnosis was Overweight (BMI 25.0-29.9). AS NOTED AND ADDRESSED ABOVE   MEDS AND AND LABS AS ORDERED TO ADDRESS PREVIOUS AND CURRENT ABNORMAL INDICES    SEE PATIENT INSTRUCTION SECTION FOR FOLLOW UP PLAN    Justine IS TO CONTINUE OTHER TREATMENT REGIMEN/PLANS EXCEPT AS INDICATED    COMPLIANCE WITH MEDICATIONS DIET AND EXERCISE PLANS ENCOURAGED    DISCONTINUED MEDS:  Medications Discontinued During This Encounter   Medication Reason     phentermine 30 MG capsule Reorder       CURRENT MED LIST WITH CHANGES AS NOTED  BELOW:  Current Outpatient Prescriptions   Medication Sig Dispense Refill     phentermine 30 MG capsule Take 1 capsule (30 mg) by mouth every other day 90 capsule 0     B Complex-C (SUPER B COMPLEX PO) Take by mouth daily       MAGNESIUM OXIDE PO Take 400 mg by mouth At Bedtime        cholecalciferol (VITAMIN D3) 70807 UNITS capsule Take 1 capsule (50,000 Units) by mouth every 14 days INDICATION:TAKE ONE CAP EVERY OTHER WEEK, FOR VITAMIN D SUPPLEMENTATION (1ST AND 15TH OF EACH MONTH), TAKE ONCE A MONTH DURING SUMMER MONTHS ONLY, RESTART TWICE MONTHLY DOSING IN THE FALL 40 capsule 0     FIBER PO        Cyanocobalamin (B-12 SUPER STRENGTH) 5000 MCG/ML LIQD Place 0.5 mLs under the tongue daily FOR VITAMIN B12 SUPPLEMENTATION, PLEASE PLACE UNDER THE TONGUE 2 Bottle PRN     calcium carbonate (TUMS CALCIUM FOR LIFE BONE) 750 MG CHEW Take 1 tablet (750 mg) by mouth 2 times daily (with meals) (Patient taking differently: Take 1,500 mg by mouth 2 times daily (with meals) ) 90 tablet      Turmeric Curcumin 500 MG CAPS Take 1 capsule by mouth 2 times daily INDICATION: ARTHRITIS (Patient not taking: Reported on 5/4/2017) 100 capsule PRN     Psyllium (METAMUCIL PO) Take 1 tsp. by mouth daily Reported on 5/4/2017             Patient Instructions   ** FOLLOW UP PLAN**:    PLEASE SCHEDULE OFFICE VISIT 3 MONTHS FROM TODAY TO FOLLOW UP ON     BE SURE TO SCHEDULE YOUR LAB DRAW APPOINTMENT FOR AT LEAST 1 WEEK BEFORE YOUR NEXT VISIT      YOU MAY CONTACT THE CLINIC IF ANY QUESTIONS OR CONCERNS -[phone] OR VIA [employer]                 Adriana Hernandez MD  [facility]

## 2017-05-04 NOTE — MR AVS SNAPSHOT
After Visit Summary   5/4/2017    Justine Michelle    MRN: [id number]           Patient Information     Date Of Birth          1945        Visit Information        Provider Department      5/4/2017 11:30 AM Adriana Hernandez MD [facility]        Today's Diagnoses     Overweight (BMI 25.0-29.9)    -  1    Vitamin B12 deficiency without anemia        Elevated ferritin level        Vitamin D deficiency        CARDIOVASCULAR SCREENING; LDL GOAL LESS THAN 160          Care Instructions    ** FOLLOW UP PLAN**:    PLEASE SCHEDULE OFFICE VISIT 3 MONTHS FROM TODAY TO FOLLOW UP ON     BE SURE TO SCHEDULE YOUR LAB DRAW APPOINTMENT FOR AT LEAST 1 WEEK BEFORE YOUR NEXT VISIT      YOU MAY CONTACT THE CLINIC IF ANY QUESTIONS OR CONCERNS -[phone] OR [facility]                   Follow-ups after your visit        Your next 10 appointments already scheduled     May 08, 2017 11:50 AM CDT   CHELO Extremity with Carson Saravia PT   [facility] [place] ([facility] [place])    [address]  [place] [address] [phone]            May 10, 2017 10:50 AM CDT   CHELO Extremity with Carson Saravia PT   [facility] [place] ([facility] [place])    [address]  [place] [id number] [phone]            May 12, 2017 11:50 AM CDT   CHELO Extremity with Carson Saravia PT   [facility] [place] ([facility] [place])    [address] [place] [address] [phone]              Future tests that were ordered for you today     Open Future Orders        Priority Expected Expires Ordered    Lipid panel reflex to direct LDL Routine 5/4/2017 11/1/2017 5/4/2017    Comprehensive metabolic panel Routine 5/4/2017 11/1/2017 5/4/2017    CBC with platelets Routine 5/4/2017 11/1/2017 5/4/2017    Vitamin D Deficiency Routine 5/4/2017 11/1/2017 5/4/2017            Who to contact     If you have questions or need follow up information about  today's clinic visit or your schedule please contact [facility] directly at [phone].  Normal or non-critical lab and imaging results will be communicated to you by [employer], letter or phone within 4 business days after the clinic has received the results. If you do not hear from us within 7 days, please contact the clinic through [employer] or phone. If you have a critical or abnormal lab result, we will notify you by phone as soon as possible.  Submit refill requests through [employer] or call your pharmacy and they will forward the refill request to us. Please allow 3 business days for your refill to be completed.          Additional Information About Your Visit        [employer] Information     [employer] gives you secure access to your electronic health record. If you see a primary care provider, you can also send messages to your care team and make appointments. If you have questions, please call your primary care clinic.  If you do not have a primary care provider, please call [phone] and they will assist you.        Care EveryWhere ID     This is your Care EveryWhere ID. This could be used by other organizations to access your [place] medical records  [id number]        Your Vitals Were     Pulse Temperature BMI (Body Mass Index)             74 98.2  F (36.8  C) (Oral) 27.83 kg/m2          Blood Pressure from Last 3 Encounters:   05/04/17 110/70   02/07/17 122/76   02/02/17 130/80    Weight from Last 3 Encounters:   05/04/17 183 lb (83 kg)   02/07/17 184 lb 14.4 oz (83.9 kg)   02/02/17 186 lb (84.4 kg)                 Today's Medication Changes          These changes are accurate as of: 5/4/17 12:11 PM.  If you have any questions, ask your nurse or doctor.               These medicines have changed or have updated prescriptions.        Dose/Directions    calcium carbonate 750 MG Chew   Commonly known as:  TUMS CALCIUM FOR LIFE BONE   This may have changed:  how much to take   Used for:   Vitamin D deficiency        Dose:  750 mg   Take 1 tablet (750 mg) by mouth 2 times daily (with meals)   Quantity:  90 tablet   Refills:  0            Where to get your medicines      Some of these will need a paper prescription and others can be bought over the counter.  Ask your nurse if you have questions.     Bring a paper prescription for each of these medications     phentermine 30 MG capsule                Primary Care Provider Office Phone # Fax #    Adriana Hernandez [phone]       [facility] [address] [facility] [address]        Thank you!     Thank you for choosing [facility]  for your care. Our goal is always to provide you with excellent care. Hearing back from our patients is one way we can continue to improve our services. Please take a few minutes to complete the written survey that you may receive in the mail after your visit with us. Thank you!             Your Updated Medication List - Protect others around you: Learn how to safely use, store and throw away your medicines at www.disposemymeds.org.          This list is accurate as of: 5/4/17 12:11 PM.  Always use your most recent med list.                   Brand Name Dispense Instructions for use    calcium carbonate 750 MG Chew    TUMS CALCIUM FOR LIFE BONE    90 tablet    Take 1 tablet (750 mg) by mouth 2 times daily (with meals)       cholecalciferol 92797 UNITS capsule    VITAMIN D3    40 capsule    Take 1 capsule (50,000 Units) by mouth every 14 days INDICATION:TAKE ONE CAP EVERY OTHER WEEK, FOR VITAMIN D SUPPLEMENTATION (1ST AND 15TH OF EACH MONTH), TAKE ONCE A MONTH DURING SUMMER MONTHS ONLY, RESTART TWICE MONTHLY DOSING IN THE FALL       Cyanocobalamin 5000 MCG/ML Liqd    B-12 SUPER STRENGTH    2 Bottle    Place 0.5 mLs under the tongue daily FOR VITAMIN B12 SUPPLEMENTATION, PLEASE PLACE UNDER THE TONGUE       FIBER PO          MAGNESIUM OXIDE PO      Take 400 mg by mouth At  Bedtime       METAMUCIL PO      Take 1 tsp. by mouth daily Reported on 5/4/2017       phentermine 30 MG capsule     90 capsule    Take 1 capsule (30 mg) by mouth every other day       SUPER B COMPLEX PO      Take by mouth daily       Turmeric Curcumin 500 MG Caps     100 capsule    Take 1 capsule by mouth 2 times daily INDICATION: ARTHRITIS

## 2017-05-08 ENCOUNTER — THERAPY VISIT (OUTPATIENT)
Dept: PHYSICAL THERAPY | Facility: CLINIC | Age: 72
End: 2017-05-08
Payer: COMMERCIAL

## 2017-05-08 DIAGNOSIS — M25.561 RIGHT KNEE PAIN, UNSPECIFIED CHRONICITY: Primary | ICD-10-CM

## 2017-05-08 PROCEDURE — G8978 MOBILITY CURRENT STATUS: HCPCS | Mod: GP | Performed by: PHYSICAL THERAPIST

## 2017-05-08 PROCEDURE — 97110 THERAPEUTIC EXERCISES: CPT | Mod: GP | Performed by: PHYSICAL THERAPIST

## 2017-05-08 PROCEDURE — 97161 PT EVAL LOW COMPLEX 20 MIN: CPT | Mod: GP | Performed by: PHYSICAL THERAPIST

## 2017-05-08 PROCEDURE — G8979 MOBILITY GOAL STATUS: HCPCS | Mod: GP | Performed by: PHYSICAL THERAPIST

## 2017-05-08 ASSESSMENT — ACTIVITIES OF DAILY LIVING (ADL)
RAW_SCORE: 44
SIT WITH YOUR KNEE BENT: ACTIVITY IS MINIMALLY DIFFICULT
GO DOWN STAIRS: ACTIVITY IS SOMEWHAT DIFFICULT
KNEE_ACTIVITY_OF_DAILY_LIVING_SUM: 44
KNEEL ON THE FRONT OF YOUR KNEE: ACTIVITY IS FAIRLY DIFFICULT
KNEE_ACTIVITY_OF_DAILY_LIVING_SCORE: 62.86
HOW_WOULD_YOU_RATE_THE_CURRENT_FUNCTION_OF_YOUR_KNEE_DURING_YOUR_USUAL_DAILY_ACTIVITIES_ON_A_SCALE_FROM_0_TO_100_WITH_100_BEING_YOUR_LEVEL_OF_KNEE_FUNCTION_PRIOR_TO_YOUR_INJURY_AND_0_BEING_THE_INABILITY_TO_PERFORM_ANY_OF_YOUR_USUAL_DAILY_ACTIVITIES?: 60
PAIN: THE SYMPTOM AFFECTS MY ACTIVITY MODERATELY
STAND: ACTIVITY IS MINIMALLY DIFFICULT
WALK: ACTIVITY IS MINIMALLY DIFFICULT
SWELLING: I HAVE THE SYMPTOM BUT IT DOES NOT AFFECT MY ACTIVITY
SQUAT: ACTIVITY IS FAIRLY DIFFICULT
STIFFNESS: THE SYMPTOM AFFECTS MY ACTIVITY SEVERELY
HOW_WOULD_YOU_RATE_THE_OVERALL_FUNCTION_OF_YOUR_KNEE_DURING_YOUR_USUAL_DAILY_ACTIVITIES?: ABNORMAL
LIMPING: I DO NOT HAVE THE SYMPTOM
GO UP STAIRS: ACTIVITY IS SOMEWHAT DIFFICULT
RISE FROM A CHAIR: ACTIVITY IS SOMEWHAT DIFFICULT
WEAKNESS: THE SYMPTOM AFFECTS MY ACTIVITY MODERATELY
GIVING WAY, BUCKLING OR SHIFTING OF KNEE: I DO NOT HAVE THE SYMPTOM
AS_A_RESULT_OF_YOUR_KNEE_INJURY,_HOW_WOULD_YOU_RATE_YOUR_CURRENT_LEVEL_OF_DAILY_ACTIVITY?: ABNORMAL

## 2017-05-08 NOTE — PROGRESS NOTES
Subjective:    Patient is a 71 year old female presenting with rehab right knee hpi. The history is provided by the patient.   Justine Michelle is a 71 year old female with a right knee condition.  Condition occurred with:  Degenerative joint disease (hx of R knee/thigh pain the past few years. Pt has tried 6 Treatments of Prolozone injections was feeling better.  Twisted R knee getting up from chair last week and now knee pain. Pt reports having UE procedure few weeks ago and resulted limited LE ex's).  Condition occurred: for unknown reasons.  This is a recurrent condition  April 2017.    Patient reports pain:  Anterior, medial and lateral.    Pain is described as aching and is intermittent and reported as 1/10 and 4/10.  Associated symptoms:  Loss of motion/stiffness and loss of strength. Pain is worse during the day and worse during the night.  Symptoms are exacerbated by standing, walking, ascending stairs, descending stairs, bending/squatting and transfers and relieved by rest and bracing/immobilizing.  Since onset symptoms are unchanged.  Special tests:  X-ray.  Previous treatment includes physical therapy.  There was mild improvement following previous treatment.    Pertinent medical history includes:  Overweight, osteoarthritis and fibromyalgia (Pt reports lost 70# over the past year plus).  Medical allergies: yes.  Other surgeries include:  Orthopedic surgery.  Medication history: see epic.  Current occupation is Retired  .    Primary job tasks include:  Prolonged sitting, prolonged standing and lifting.    Barriers include:  None as reported by the patient.    Red flags:  None as reported by the patient.                        Objective:      Gait:    Gait Type:  Antalgic   Weight Bearing Status:  WBAT   Assistive Devices:  None                                                        Knee Evaluation:  ROM:    AROM      Extension:  Left: 3    Right:  9  Flexion: Left: 138    Right:  125        Strength:         Quad Set Left: Good    Pain:   Quad Set Right: Fair and delayed    Pain:                  General     ROS    Assessment/Plan:      Patient is a 71 year old female with right side knee complaints.    Patient has the following significant findings with corresponding treatment plan.                Diagnosis 1:  R knee pain DJD lateral compartment  Pain -  hot/cold therapy, self management, education and home program  Decreased ROM/flexibility - manual therapy and therapeutic exercise  Decreased joint mobility - manual therapy and therapeutic exercise  Decreased strength - therapeutic exercise and therapeutic activities  Impaired balance - neuro re-education and therapeutic activities  Decreased proprioception - neuro re-education and therapeutic activities  Impaired gait - gait training  Decreased function - therapeutic activities    Therapy Evaluation Codes:   1) History comprised of:   Personal factors that impact the plan of care:      None.    Comorbidity factors that impact the plan of care are:      None.     Medications impacting care: None.  2) Examination of Body Systems comprised of:   Body structures and functions that impact the plan of care:      Knee.   Activity limitations that impact the plan of care are:      Lifting, Squatting/kneeling, Stairs and Walking.  3) Clinical presentation characteristics are:   Stable/Uncomplicated.  4) Decision-Making    Low complexity using standardized patient assessment instrument and/or measureable assessment of functional outcome.  Cumulative Therapy Evaluation is: Low complexity.    Previous and current functional limitations:  (See Goal Flow Sheet for this information)    Short term and Long term goals: (See Goal Flow Sheet for this information)     Communication ability:  Patient appears to be able to clearly communicate and understand verbal and written communication and follow directions correctly.  Treatment Explanation - The  following has been discussed with the patient:   RX ordered/plan of care  Anticipated outcomes  Possible risks and side effects  This patient would benefit from PT intervention to resume normal activities.   Rehab potential is good.    Frequency:  2 X week, once daily  Duration:  for 4 weeks  Discharge Plan:  Achieve all LTG.  Independent in home treatment program.  Reach maximal therapeutic benefit.    Please refer to the daily flowsheet for treatment today, total treatment time and time spent performing 1:1 timed codes.

## 2017-05-08 NOTE — MR AVS SNAPSHOT
After Visit Summary   5/8/2017    Justine Michelle    MRN: [id number]           Patient Information     Date Of Birth          1945        Visit Information        Provider Department      5/8/2017 11:50 AM Carson Saravia PT [facility] [place]        Today's Diagnoses     Right knee pain, unspecified chronicity    -  1       Follow-ups after your visit        Your next 10 appointments already scheduled     May 10, 2017 10:15 AM CDT   LAB with SV LAB   [facility]    [address] [facility] [address]   [phone]           Patient must bring picture ID.  Patient should be prepared to give a urine specimen  Please do not eat 10-12 hours before your appointment if you are coming in fasting for labs on lipids, cholesterol, or glucose (sugar).  Pregnant women should follow their Care Team instructions. Water with medications is okay. Do not drink coffee or other fluids.   If you have concerns about taking  your medications, please ask at office or if scheduling via Flipiture, send a message by clicking on Secure Messaging, Message Your Care Team.            May 10, 2017 10:50 AM CDT   CHELO Extremity with Carson Saravia PT   [facility] [place] ([facility] [place])    [address] [place] [address]   [phone]            May 12, 2017 11:50 AM CDT   CHELO Extremity with Carson Saravia PT   [facility] [place] ([facility] [place])    [address] [facility] [address]   [phone]            Aug 01, 2017 12:00 PM CDT   Office Visit with Adriana Hernandez MD   [facility]    [address] [address] [phone]           Bring a current list of meds and any records pertaining to this visit.  For Physicals, please bring immunization records and any forms needing to be filled out.  Please arrive 10  minutes early to complete paperwork.              Who to contact     If you have questions or need follow up information about today's clinic visit or your schedule please contact [facility] directly at [phone].  Normal or non-critical lab and imaging results will be communicated to you by [employer], letter or phone within 4 business days after the clinic has received the results. If you do not hear from us within 7 days, please contact the clinic through [employer] or phone. If you have a critical or abnormal lab result, we will notify you by phone as soon as possible.  Submit refill requests through Archsy or call your pharmacy and they will forward the refill request to us. Please allow 3 business days for your refill to be completed.          Additional Information About Your Visit        Archsy Information     [employer] gives you secure access to your electronic health record. If you see a primary care provider, you can also send messages to your care team and make appointments. If you have questions, please call your primary care clinic.  If you do not have a primary care provider, please call [phone] and they will assist you.        Care EveryWhere ID     This is your Care EveryWhere ID. This could be used by other organizations to access your [place] medical records  [id number]         Blood Pressure from Last 3 Encounters:   05/04/17 110/70   02/07/17 122/76   02/02/17 130/80    Weight from Last 3 Encounters:   05/04/17 83 kg (183 lb)   02/07/17 83.9 kg (184 lb 14.4 oz)   02/02/17 84.4 kg (186 lb)              We Performed the Following     HC PT EVAL, LOW COMPLEXITY     CHELO CERT REPORT     THERAPEUTIC EXERCISES          Today's Medication Changes          These changes are accurate as of: 5/8/17  2:37 PM.  If you have any questions, ask your nurse or doctor.               These medicines have changed or have updated prescriptions.        Dose/Directions    calcium  carbonate 750 MG Chew   Commonly known as:  TUMS CALCIUM FOR LIFE BONE   This may have changed:  how much to take   Used for:  Vitamin D deficiency        Dose:  750 mg   Take 1 tablet (750 mg) by mouth 2 times daily (with meals)   Quantity:  90 tablet   Refills:  0                Primary Care Provider Office Phone # Fax #    Adriana Hernandez [phone]       [facility] [address]  [facility] [address]        Thank you!     Thank you for choosing [facility]  for your care. Our goal is always to provide you with excellent care. Hearing back from our patients is one way we can continue to improve our services. Please take a few minutes to complete the written survey that you may receive in the mail after your visit with us. Thank you!             Your Updated Medication List - Protect others around you: Learn how to safely use, store and throw away your medicines at www.disposemymeds.org.          This list is accurate as of: 5/8/17  2:37 PM.  Always use your most recent med list.                   Brand Name Dispense Instructions for use    calcium carbonate 750 MG Chew    TUMS CALCIUM FOR LIFE BONE    90 tablet    Take 1 tablet (750 mg) by mouth 2 times daily (with meals)       cholecalciferol 53392 UNITS capsule    VITAMIN D3    40 capsule    Take 1 capsule (50,000 Units) by mouth every 14 days INDICATION:TAKE ONE CAP EVERY OTHER WEEK, FOR VITAMIN D SUPPLEMENTATION (1ST AND 15TH OF EACH MONTH), TAKE ONCE A MONTH DURING SUMMER MONTHS ONLY, RESTART TWICE MONTHLY DOSING IN THE FALL       Cyanocobalamin 5000 MCG/ML Liqd    B-12 SUPER STRENGTH    2 Bottle    Place 0.5 mLs under the tongue daily FOR VITAMIN B12 SUPPLEMENTATION, PLEASE PLACE UNDER THE TONGUE       FIBER PO          MAGNESIUM OXIDE PO      Take 400 mg by mouth At Bedtime       METAMUCIL PO      Take 1 tsp. by mouth daily Reported on 5/4/2017       phentermine 30 MG capsule     90 capsule     Take 1 capsule (30 mg) by mouth every other day       SUPER B COMPLEX PO      Take by mouth daily       Turmeric Curcumin 500 MG Caps     100 capsule    Take 1 capsule by mouth 2 times daily INDICATION: ARTHRITIS

## 2017-05-10 ENCOUNTER — THERAPY VISIT (OUTPATIENT)
Dept: PHYSICAL THERAPY | Facility: CLINIC | Age: 72
End: 2017-05-10
Payer: COMMERCIAL

## 2017-05-10 DIAGNOSIS — E55.9 VITAMIN D DEFICIENCY: ICD-10-CM

## 2017-05-10 DIAGNOSIS — M25.561 RIGHT KNEE PAIN, UNSPECIFIED CHRONICITY: ICD-10-CM

## 2017-05-10 DIAGNOSIS — R79.89 ELEVATED FERRITIN LEVEL: ICD-10-CM

## 2017-05-10 DIAGNOSIS — Z13.6 CARDIOVASCULAR SCREENING; LDL GOAL LESS THAN 160: ICD-10-CM

## 2017-05-10 LAB
ERYTHROCYTE [DISTWIDTH] IN BLOOD BY AUTOMATED COUNT: 13 % (ref 10–15)
HCT VFR BLD AUTO: 38.7 % (ref 35–47)
HGB BLD-MCNC: 12.9 G/DL (ref 11.7–15.7)
MCH RBC QN AUTO: 31.1 PG (ref 26.5–33)
MCHC RBC AUTO-ENTMCNC: 33.3 G/DL (ref 31.5–36.5)
MCV RBC AUTO: 93 FL (ref 78–100)
PLATELET # BLD AUTO: 221 10E9/L (ref 150–450)
RBC # BLD AUTO: 4.15 10E12/L (ref 3.8–5.2)
WBC # BLD AUTO: 3.4 10E9/L (ref 4–11)

## 2017-05-10 PROCEDURE — 97110 THERAPEUTIC EXERCISES: CPT | Mod: GP | Performed by: PHYSICAL THERAPIST

## 2017-05-10 PROCEDURE — 80061 LIPID PANEL: CPT | Performed by: FAMILY MEDICINE

## 2017-05-10 PROCEDURE — 97530 THERAPEUTIC ACTIVITIES: CPT | Mod: GP | Performed by: PHYSICAL THERAPIST

## 2017-05-10 PROCEDURE — 82306 VITAMIN D 25 HYDROXY: CPT | Performed by: FAMILY MEDICINE

## 2017-05-10 PROCEDURE — 80053 COMPREHEN METABOLIC PANEL: CPT | Performed by: FAMILY MEDICINE

## 2017-05-10 PROCEDURE — 85027 COMPLETE CBC AUTOMATED: CPT | Performed by: FAMILY MEDICINE

## 2017-05-10 PROCEDURE — 36415 COLL VENOUS BLD VENIPUNCTURE: CPT | Performed by: FAMILY MEDICINE

## 2017-05-11 LAB
ALBUMIN SERPL-MCNC: 4 G/DL (ref 3.4–5)
ALP SERPL-CCNC: 77 U/L (ref 40–150)
ALT SERPL W P-5'-P-CCNC: 21 U/L (ref 0–50)
ANION GAP SERPL CALCULATED.3IONS-SCNC: 7 MMOL/L (ref 3–14)
AST SERPL W P-5'-P-CCNC: 15 U/L (ref 0–45)
BILIRUB SERPL-MCNC: 0.7 MG/DL (ref 0.2–1.3)
BUN SERPL-MCNC: 12 MG/DL (ref 7–30)
CALCIUM SERPL-MCNC: 9.5 MG/DL (ref 8.5–10.1)
CHLORIDE SERPL-SCNC: 109 MMOL/L (ref 94–109)
CHOLEST SERPL-MCNC: 185 MG/DL
CO2 SERPL-SCNC: 26 MMOL/L (ref 20–32)
CREAT SERPL-MCNC: 0.76 MG/DL (ref 0.52–1.04)
DEPRECATED CALCIDIOL+CALCIFEROL SERPL-MC: 62 UG/L (ref 20–75)
GFR SERPL CREATININE-BSD FRML MDRD: 75 ML/MIN/1.7M2
GLUCOSE SERPL-MCNC: 86 MG/DL (ref 70–99)
HDLC SERPL-MCNC: 73 MG/DL
LDLC SERPL CALC-MCNC: 102 MG/DL
NONHDLC SERPL-MCNC: 112 MG/DL
POTASSIUM SERPL-SCNC: 4.3 MMOL/L (ref 3.4–5.3)
PROT SERPL-MCNC: 7.2 G/DL (ref 6.8–8.8)
SODIUM SERPL-SCNC: 142 MMOL/L (ref 133–144)
TRIGL SERPL-MCNC: 49 MG/DL

## 2017-06-05 ENCOUNTER — THERAPY VISIT (OUTPATIENT)
Dept: PHYSICAL THERAPY | Facility: CLINIC | Age: 72
End: 2017-06-05
Payer: COMMERCIAL

## 2017-06-05 DIAGNOSIS — M25.561 RIGHT KNEE PAIN, UNSPECIFIED CHRONICITY: ICD-10-CM

## 2017-06-05 PROCEDURE — 97110 THERAPEUTIC EXERCISES: CPT | Mod: GP | Performed by: PHYSICAL THERAPIST

## 2017-06-23 ENCOUNTER — THERAPY VISIT (OUTPATIENT)
Dept: PHYSICAL THERAPY | Facility: CLINIC | Age: 72
End: 2017-06-23
Payer: COMMERCIAL

## 2017-06-23 DIAGNOSIS — M25.561 RIGHT KNEE PAIN, UNSPECIFIED CHRONICITY: ICD-10-CM

## 2017-06-23 PROCEDURE — G8978 MOBILITY CURRENT STATUS: HCPCS | Mod: GP | Performed by: PHYSICAL THERAPIST

## 2017-06-23 PROCEDURE — G8979 MOBILITY GOAL STATUS: HCPCS | Mod: GP | Performed by: PHYSICAL THERAPIST

## 2017-06-23 PROCEDURE — 97530 THERAPEUTIC ACTIVITIES: CPT | Mod: GP | Performed by: PHYSICAL THERAPIST

## 2017-06-23 PROCEDURE — 97110 THERAPEUTIC EXERCISES: CPT | Mod: GP | Performed by: PHYSICAL THERAPIST

## 2017-06-23 NOTE — MR AVS SNAPSHOT
After Visit Summary   6/23/2017    Justine Michelle    MRN: [id number]           Patient Information     Date Of Birth          1945        Visit Information        Provider Department      6/23/2017 12:30 PM Carson Saravia PT [facility] [place]        Today's Diagnoses     Right knee pain, unspecified chronicity           Follow-ups after your visit        Your next 10 appointments already scheduled     Aug 01, 2017 12:00 PM CDT   Office Visit with Adriana Hernandez MD   [facility] ([facility]    [address] [id number] [phone]           Bring a current list of meds and any records pertaining to this visit.  For Physicals, please bring immunization records and any forms needing to be filled out.  Please arrive 10 minutes early to complete paperwork.              Who to contact     If you have questions or need follow up information about today's clinic visit or your schedule please contact [facility] [place] directly at [phone].  Normal or non-critical lab and imaging results will be communicated to you by L[employer]hart, letter or phone within 4 business days after the clinic has received the results. If you do not hear from us within 7 days, please contact the clinic through [employer] or phone. If you have a critical or abnormal lab result, we will notify you by phone as soon as possible.  Submit refill requests through Fittr or call your pharmacy and they will forward the refill request to us. Please allow 3 business days for your refill to be completed.          Additional Information About Your Visit        MyChart Information     Fittr gives you secure access to your electronic health record. If you see a primary care provider, you can also send messages to your care team and make appointments. If you have questions, please call your primary care clinic.  If  you do not have a primary care provider, please call [phone] and they will assist you.        Care EveryWhere ID     This is your Care EveryWhere ID. This could be used by other organizations to access your [place] medical records  [id number]         Blood Pressure from Last 3 Encounters:   05/04/17 110/70   02/07/17 122/76   02/02/17 130/80    Weight from Last 3 Encounters:   05/04/17 83 kg (183 lb)   02/07/17 83.9 kg (184 lb 14.4 oz)   02/02/17 84.4 kg (186 lb)              We Performed the Following     CHELO PROGRESS NOTES REPORT     THERAPEUTIC ACTIVITIES     THERAPEUTIC EXERCISES          Today's Medication Changes          These changes are accurate as of: 6/23/17  2:14 PM.  If you have any questions, ask your nurse or doctor.               These medicines have changed or have updated prescriptions.        Dose/Directions    calcium carbonate 750 MG Chew   Commonly known as:  TUMS CALCIUM FOR LIFE BONE   This may have changed:  how much to take   Used for:  Vitamin D deficiency        Dose:  750 mg   Take 1 tablet (750 mg) by mouth 2 times daily (with meals)   Quantity:  90 tablet   Refills:  0                Primary Care Provider Office Phone # Fax #    Adriana Hernandez [phone]       [facility] [address] [place] [address]        Equal Access to Services     MATEUS DIAMOND AH: Marco A delvalle Somarie, waaxda luqadaha, qaybta kaalmada elianegyada, ahmet erddy. So [facility] [phone].    ATENCIÓN: Si habla español, tiene a lopez disposición servicios gratuitos de asistencia lingüística. Llame al [phone].    We comply with applicable federal civil rights laws and [place] laws. We do not discriminate on the basis of race, color, national origin, age, disability sex, sexual orientation or gender identity.            Thank you!     Thank you for choosing [facility]  for your care. Our goal is always to  provide you with excellent care. Hearing back from our patients is one way we can continue to improve our services. Please take a few minutes to complete the written survey that you may receive in the mail after your visit with us. Thank you!             Your Updated Medication List - Protect others around you: Learn how to safely use, store and throw away your medicines at www.disposemymeds.org.          This list is accurate as of: 6/23/17  2:14 PM.  Always use your most recent med list.                   Brand Name Dispense Instructions for use Diagnosis    calcium carbonate 750 MG Chew    TUMS CALCIUM FOR LIFE BONE    90 tablet    Take 1 tablet (750 mg) by mouth 2 times daily (with meals)    Vitamin D deficiency       cholecalciferol 39269 UNITS capsule    VITAMIN D3    40 capsule    Take 1 capsule (50,000 Units) by mouth every 14 days INDICATION:TAKE ONE CAP EVERY OTHER WEEK, FOR VITAMIN D SUPPLEMENTATION (1ST AND 15TH OF EACH MONTH), TAKE ONCE A MONTH DURING SUMMER MONTHS ONLY, RESTART TWICE MONTHLY DOSING IN THE FALL    Fatigue, unspecified type, Vitamin D deficiency       Cyanocobalamin 5000 MCG/ML Liqd    B-12 SUPER STRENGTH    2 Bottle    Place 0.5 mLs under the tongue daily FOR VITAMIN B12 SUPPLEMENTATION, PLEASE PLACE UNDER THE TONGUE    Vitamin B12 deficiency without anemia       FIBER PO           MAGNESIUM OXIDE PO      Take 400 mg by mouth At Bedtime        METAMUCIL PO      Take 1 tsp. by mouth daily Reported on 5/4/2017        phentermine 30 MG capsule     90 capsule    Take 1 capsule (30 mg) by mouth every other day    Overweight (BMI 25.0-29.9)       SUPER B COMPLEX PO      Take by mouth daily        Turmeric Curcumin 500 MG Caps     100 capsule    Take 1 capsule by mouth 2 times daily INDICATION: ARTHRITIS    Chronic pain of right knee

## 2017-06-23 NOTE — LETTER
[employer] [facility]  [address]  [facility] [address]  [phone]    2017    Re: Justine Michelle   :   1945  MRN:  [id number]   REFERRING PHYSICIAN:   Faustino Myrick    [facility]  Date of Initial Evaluation:  5/10/2017  Visits:  Rxs Used: 4  Reason for Referral:  Right knee pain, unspecified chronicity    PROGRESS  REPORT  Progress reporting period is from initial to .       SUBJECTIVE  Subjective changes noted by patient:  Justine returns to PT feeling her strength is progressing she is able to add weight with SLR and she is doing Yoga squats feeling better. Stairs are the most challenging.  Pt reports have pain 4-5/10, before her slip her pain was 1/10 so she is wondering if her knee will eventually get less painful or if this is her new normal.   Current pain level is 4/10  .     Previous pain level was  4/10 Initial Pain level: 6+/10.   Changes in function:  Yes (See Goal flowsheet attached for changes in current functional level)  Adverse reaction to treatment or activity: None    OBJECTIVE  Changes noted in objective findings:  Yes, PROM improved to 0-0-122 Passive ROM,  good SLR control,  CKC DBL LE squat improved tolerance (pt favors R leg slightly, using L leg more) so Single Leg tasks like step ups/stair are challenging for R leg.     ASSESSMENT/PLAN  Updated problem list and treatment plan: Diagnosis 1:  R knee OA  Pain -  hot/cold therapy, self management, education and home program  Decreased ROM/flexibility - manual therapy and therapeutic exercise  Decreased strength - therapeutic exercise and therapeutic activities  Inflammation - cold therapy  Impaired gait - gait training  Decreased function - therapeutic activities  STG/LTGs have been met or progress has been made towards goals:  Yes (See Goal flow sheet completed today.)  Assessment of Progress: The patient's condition has potential to improve.  The patient's  progress has plateaued.  Self Management Plans:  Patient has been instructed in a home treatment program.    Re: Justine Michelle   :   1945    Patient  has been instructed in self management of symptoms.  I have re-evaluated this patient and find that the nature, scope, duration and intensity of the therapy is appropriate for the medical condition of the patient.  Justine continues to require the following intervention to meet STG and LTG's:  PT    Recommendations:  This patient would benefit from further evaluation, Justine has progressed with improved knee ROM, open kinetic chain strength improved, CKC 2 leg tasks better squatting and sit-stand feels better, 1 legged CKC is difficult with R leg with step up/stairs.  Pt will cont with OKC and light CKC HEP with goal of improved quad strength while not causing increased knee pain.              Thank you for your referral.    INQUIRIES  Therapist: Kike Saravia PT   [facility]  [address]  [place] [address]  Phone: [phone]  Fax: [phone]

## 2017-06-23 NOTE — PROGRESS NOTES
Subjective:    HPI                    Objective:    System    Physical Exam    General     ROS    Assessment/Plan:      PROGRESS  REPORT    Progress reporting period is from initial to 6/23.       SUBJECTIVE  Subjective changes noted by patient:  Justine returns to PT feeling her strength is progressing she is able to add weight with SLR and she is doing Yoga squats feeling better. Stairs are the most challenging.  Pt reports have pain 4-5/10, before her slip her pain was 1/10 so she is wondering if her knee will eventually get less painful or if this is her new normal.   Current pain level is 4/10  .     Previous pain level was  4/10 Initial Pain level: 6+/10.   Changes in function:  Yes (See Goal flowsheet attached for changes in current functional level)  Adverse reaction to treatment or activity: None    OBJECTIVE  Changes noted in objective findings:  Yes, PROM improved to 0-0-122 Passive ROM,  good SLR control,  CKC DBL LE squat improved tolerance (pt favors R leg slightly, using L leg more) so Single Leg tasks like step ups/stair are challenging for R leg.     ASSESSMENT/PLAN  Updated problem list and treatment plan: Diagnosis 1:  R knee OA  Pain -  hot/cold therapy, self management, education and home program  Decreased ROM/flexibility - manual therapy and therapeutic exercise  Decreased strength - therapeutic exercise and therapeutic activities  Inflammation - cold therapy  Impaired gait - gait training  Decreased function - therapeutic activities  STG/LTGs have been met or progress has been made towards goals:  Yes (See Goal flow sheet completed today.)  Assessment of Progress: The patient's condition has potential to improve.  The patient's progress has plateaued.  Self Management Plans:  Patient has been instructed in a home treatment program.  Patient  has been instructed in self management of symptoms.  I have re-evaluated this patient and find that the nature, scope, duration and intensity of the  therapy is appropriate for the medical condition of the patient.  Justine continues to require the following intervention to meet STG and LTG's:  PT    Recommendations:  This patient would benefit from further evaluation, Justine has progressed with improved knee ROM, open kinetic chain strength improved, CKC 2 leg tasks better squatting and sit-stand feels better, 1 legged CKC is difficult with R leg with step up/stairs.  Pt will cont with OKC and light CKC HEP with goal of improved quad strength while not causing increased knee pain.    Please refer to the daily flowsheet for treatment today, total treatment time and time spent performing 1:1 timed codes.

## 2017-09-05 ENCOUNTER — OFFICE VISIT (OUTPATIENT)
Dept: INTERNAL MEDICINE | Facility: CLINIC | Age: 72
End: 2017-09-05
Payer: COMMERCIAL

## 2017-09-05 VITALS
TEMPERATURE: 98.8 F | DIASTOLIC BLOOD PRESSURE: 72 MMHG | OXYGEN SATURATION: 99 % | HEIGHT: 68 IN | SYSTOLIC BLOOD PRESSURE: 116 MMHG | WEIGHT: 188.1 LBS | HEART RATE: 73 BPM | BODY MASS INDEX: 28.51 KG/M2

## 2017-09-05 DIAGNOSIS — E55.9 VITAMIN D DEFICIENCY: ICD-10-CM

## 2017-09-05 DIAGNOSIS — R53.83 FATIGUE, UNSPECIFIED TYPE: ICD-10-CM

## 2017-09-05 DIAGNOSIS — R79.89 HIGH SERUM FERRITIN: ICD-10-CM

## 2017-09-05 DIAGNOSIS — D72.819 LEUKOPENIA, UNSPECIFIED TYPE: Primary | ICD-10-CM

## 2017-09-05 DIAGNOSIS — M17.31 POST-TRAUMATIC OSTEOARTHRITIS OF RIGHT KNEE: ICD-10-CM

## 2017-09-05 DIAGNOSIS — E66.3 OVERWEIGHT (BMI 25.0-29.9): ICD-10-CM

## 2017-09-05 LAB
ALBUMIN SERPL-MCNC: 3.9 G/DL (ref 3.4–5)
ALP SERPL-CCNC: 84 U/L (ref 40–150)
ALT SERPL W P-5'-P-CCNC: 24 U/L (ref 0–50)
ANION GAP SERPL CALCULATED.3IONS-SCNC: 6 MMOL/L (ref 3–14)
AST SERPL W P-5'-P-CCNC: 20 U/L (ref 0–45)
BILIRUB SERPL-MCNC: 0.6 MG/DL (ref 0.2–1.3)
BUN SERPL-MCNC: 18 MG/DL (ref 7–30)
CALCIUM SERPL-MCNC: 10 MG/DL (ref 8.5–10.1)
CHLORIDE SERPL-SCNC: 106 MMOL/L (ref 94–109)
CO2 SERPL-SCNC: 29 MMOL/L (ref 20–32)
CREAT SERPL-MCNC: 0.88 MG/DL (ref 0.52–1.04)
FERRITIN SERPL-MCNC: 325 NG/ML (ref 8–252)
GFR SERPL CREATININE-BSD FRML MDRD: 63 ML/MIN/1.7M2
GLUCOSE SERPL-MCNC: 87 MG/DL (ref 70–99)
IRON SATN MFR SERPL: 19 % (ref 15–46)
IRON SERPL-MCNC: 52 UG/DL (ref 35–180)
POTASSIUM SERPL-SCNC: 3.7 MMOL/L (ref 3.4–5.3)
PROT SERPL-MCNC: 7.8 G/DL (ref 6.8–8.8)
RETICS # AUTO: 48.3 10E9/L (ref 25–95)
RETICS/RBC NFR AUTO: 1.2 % (ref 0.5–2)
SODIUM SERPL-SCNC: 141 MMOL/L (ref 133–144)
TIBC SERPL-MCNC: 281 UG/DL (ref 240–430)
TRANSFERRIN SERPL-MCNC: 232 MG/DL (ref 210–360)

## 2017-09-05 PROCEDURE — 80053 COMPREHEN METABOLIC PANEL: CPT | Performed by: INTERNAL MEDICINE

## 2017-09-05 PROCEDURE — 82728 ASSAY OF FERRITIN: CPT | Performed by: INTERNAL MEDICINE

## 2017-09-05 PROCEDURE — 84466 ASSAY OF TRANSFERRIN: CPT | Performed by: INTERNAL MEDICINE

## 2017-09-05 PROCEDURE — 83540 ASSAY OF IRON: CPT | Performed by: INTERNAL MEDICINE

## 2017-09-05 PROCEDURE — 85045 AUTOMATED RETICULOCYTE COUNT: CPT | Performed by: INTERNAL MEDICINE

## 2017-09-05 PROCEDURE — 85060 BLOOD SMEAR INTERPRETATION: CPT | Performed by: INTERNAL MEDICINE

## 2017-09-05 PROCEDURE — 36415 COLL VENOUS BLD VENIPUNCTURE: CPT | Performed by: INTERNAL MEDICINE

## 2017-09-05 PROCEDURE — 85025 COMPLETE CBC W/AUTO DIFF WBC: CPT | Performed by: INTERNAL MEDICINE

## 2017-09-05 PROCEDURE — 99215 OFFICE O/P EST HI 40 MIN: CPT | Performed by: INTERNAL MEDICINE

## 2017-09-05 NOTE — MR AVS SNAPSHOT
After Visit Summary   9/5/2017    Justine Michelle    MRN: [id number]           Patient Information     Date Of Birth          1945        Visit Information        Provider Department      9/5/2017 2:30 PM Adriana Hernandez MD [facility]        Today's Diagnoses     Leukopenia, unspecified type    -  1    Overweight (BMI 25.0-29.9)        Fatigue, unspecified type        Vitamin D deficiency        High serum ferritin        Post-traumatic osteoarthritis of right knee          Care Instructions    ** FOLLOW UP PLAN**:    PLEASE SCHEDULE E-VISIT 1 WEEK FROM TODAY TO FOLLOW UP ON Test and MRI results     TO INITIATE AN e-VISIT PLEASE GO UNDER THE SECURE MESSAGING TAB IN Exec[employer] AND CLICK ON REQUEST e-VISIT TAB      YOU HAVE ALSO BEEN REFERRED FOR ORTHOPEDICS reevaluation TO ADDRESS ISSUES RAISED TODAY REGARDING POSSIBLE LEFT COLLATERAL LIGAMENT ISSUES OF THE RIGHT KNEE      You will be contacted by radiology scheduling regarding your MRI      YOU MAY CONTACT THE CLINIC IF ANY QUESTIONS OR CONCERNS -[phone] OR VIA ExecNote          Follow-ups after your visit        Future tests that were ordered for you today     Open Future Orders        Priority Expected Expires Ordered    MR Knee Right w/o Contrast Routine  9/5/2018 9/5/2017            Who to contact     If you have questions or need follow up information about today's clinic visit or your schedule please contact [facility] directly at [phone].  Normal or non-critical lab and imaging results will be communicated to you by [employer], letter or phone within 4 business days after the clinic has received the results. If you do not hear from us within 7 days, please contact the clinic through [employer] or phone. If you have a critical or abnormal lab result, we will notify you by phone as soon as possible.  Submit refill requests through [employer] or call your pharmacy and they will forward  "the refill request to us. Please allow 3 business days for your refill to be completed.          Additional Information About Your Visit        [employer] Information     [employer] gives you secure access to your electronic health record. If you see a primary care provider, you can also send messages to your care team and make appointments. If you have questions, please call your primary care clinic.  If you do not have a primary care provider, please call [phone] and they will assist you.        Care EveryWhere ID     This is your Care EveryWhere ID. This could be used by other organizations to access your [facility] medical records  [id number]        Your Vitals Were     Pulse Temperature Height Pulse Oximetry BMI (Body Mass Index)       73 98.8  F (37.1  C) (Oral) 5' 8\" (1.727 m) 99% 28.6 kg/m2        Blood Pressure from Last 3 Encounters:   09/05/17 116/72   05/04/17 110/70   02/07/17 122/76    Weight from Last 3 Encounters:   09/05/17 188 lb 1.6 oz (85.3 kg)   05/04/17 183 lb (83 kg)   02/07/17 184 lb 14.4 oz (83.9 kg)              We Performed the Following     Blood Morphology Pathologist Review     CBC with platelets differential     Comprehensive metabolic panel     Ferritin     Iron and iron binding capacity     Reticulocyte Count     Transferrin          Today's Medication Changes          These changes are accurate as of: 9/5/17  5:01 PM.  If you have any questions, ask your nurse or doctor.               These medicines have changed or have updated prescriptions.        Dose/Directions    calcium carbonate 750 MG Chew   Commonly known as:  TUMS CALCIUM FOR LIFE BONE   This may have changed:  how much to take   Used for:  Vitamin D deficiency        Dose:  750 mg   Take 1 tablet (750 mg) by mouth 2 times daily (with meals)   Quantity:  90 tablet   Refills:  0       cholecalciferol 69389 UNITS capsule   Commonly known as:  VITAMIN D3   This may have changed:  additional instructions   Used for:  Fatigue, " unspecified type, Vitamin D deficiency   Changed by:  Adriana Hernandez MD        Dose:  1 capsule   Take 1 capsule (50,000 Units) by mouth every 14 days TAKING ONE TAB TWICE A MONTH   Quantity:  40 capsule   Refills:  0         Stop taking these medicines if you haven't already. Please contact your care team if you have questions.     METAMUCIL PO   Stopped by:  Adriana Hernandez MD           phentermine 30 MG capsule   Stopped by:  Adriana Hernandez MD                Where to get your medicines      Some of these will need a paper prescription and others can be bought over the counter.  Ask your nurse if you have questions.     Bring a paper prescription for each of these medications     cholecalciferol 02065 UNITS capsule                Primary Care Provider Office Phone # Fax #    Adriana Hernandez [phone]       [address] [facility] [address]        Equal Access to Services     [facility]: Hadii shahrzad delvalle Somarie, waaxda luqadaha, qaybta kaalmada nolan, ahmet johnson . So [facility] [phone].    ATENCIÓN: Si habla español, tiene a lopez disposición servicios gratuitos de asistencia lingüística. Llame al [phone].    We comply with applicable federal civil rights laws and [place] laws. We do not discriminate on the basis of race, color, national origin, age, disability sex, sexual orientation or gender identity.            Thank you!     Thank you for choosing [facility]  for your care. Our goal is always to provide you with excellent care. Hearing back from our patients is one way we can continue to improve our services. Please take a few minutes to complete the written survey that you may receive in the mail after your visit with us. Thank you!             Your Updated Medication List - Protect others around you: Learn how to safely use, store and throw away your medicines at www.disposemymeds.org.          This  list is accurate as of: 9/5/17  5:01 PM.  Always use your most recent med list.                   Brand Name Dispense Instructions for use Diagnosis    calcium carbonate 750 MG Chew    TUMS CALCIUM FOR LIFE BONE    90 tablet    Take 1 tablet (750 mg) by mouth 2 times daily (with meals)    Vitamin D deficiency       cholecalciferol 93060 UNITS capsule    VITAMIN D3    40 capsule    Take 1 capsule (50,000 Units) by mouth every 14 days TAKING ONE TAB TWICE A MONTH    Fatigue, unspecified type, Vitamin D deficiency       Cyanocobalamin 5000 MCG/ML Liqd    B-12 SUPER STRENGTH    2 Bottle    Place 0.5 mLs under the tongue daily FOR VITAMIN B12 SUPPLEMENTATION, PLEASE PLACE UNDER THE TONGUE    Vitamin B12 deficiency without anemia       FIBER PO           MAGNESIUM OXIDE PO      Take 400 mg by mouth At Bedtime        SUPER B COMPLEX PO      Take by mouth daily        Turmeric Curcumin 500 MG Caps     100 capsule    Take 1 capsule by mouth 2 times daily INDICATION: ARTHRITIS    Chronic pain of right knee

## 2017-09-05 NOTE — PATIENT INSTRUCTIONS
** FOLLOW UP PLAN**:    PLEASE SCHEDULE E-VISIT 1 WEEK FROM TODAY TO FOLLOW UP ON Test and MRI results     TO INITIATE AN e-VISIT PLEASE GO UNDER THE SECURE MESSAGING TAB IN [employer] AND CLICK ON REQUEST e-VISIT TAB      YOU HAVE ALSO BEEN REFERRED FOR ORTHOPEDICS reevaluation TO ADDRESS ISSUES RAISED TODAY REGARDING POSSIBLE LEFT COLLATERAL LIGAMENT ISSUES OF THE RIGHT KNEE      You will be contacted by radiology scheduling regarding your MRI      YOU MAY CONTACT THE CLINIC IF ANY QUESTIONS OR CONCERNS -[phone] OR VIA [employer]

## 2017-09-05 NOTE — PROGRESS NOTES
"  SUBJECTIVE:   Justine Michelle is a 72 year old female who presents to clinic today for the following health issues:      Concern - Patient is here to follow up on weight loss and vitamin defiencies     Justine reports feeling really good.     Continued lifestyle changes with regards to diet and increased exercise. She has been maintaining weight.    Justine's most recent test results are as noted and addressed in the plan section of this note, and are significant for findings consistent with:   Leukopenia, unspecified type    High serum ferritin  This has been ongoing for the past 1 year or so and serum ferritin has stayed stable right around 350 and white count has been also stable right around 3.4-3.5.  She is asymptomatic except for a few aches and pains.    Other significant issues as outlined and addressed in the plan section of this note.      Problem list and histories reviewed & adjusted, as indicated.  Additional history: as documented    Labs reviewed in EPIC    Reviewed and updated as needed this visit by clinical staff       Reviewed and updated as needed this visit by Provider         ROS:  14 point ROS negative except, for stiffness of the right knee after prolonged immobilization. No history of knee instability given.      OBJECTIVE:     /72  Pulse 73  Temp 98.8  F (37.1  C) (Oral)  Ht 5' 8\" (1.727 m)  Wt 188 lb 1.6 oz (85.3 kg)  SpO2 99%  BMI 28.6 kg/m2  Body mass index is 28.6 kg/(m^2).  GENERAL: healthy, alert and no distress  EYES: Eyes grossly normal to inspection, PERRL and conjunctivae and sclerae normal  NECK: no adenopathy, no asymmetry, masses, or scars and thyroid normal to palpation  RESP: lungs clear to auscultation - no rales, rhonchi or wheezes  CV: regular rate and rhythm, normal S1 S2, no S3 or S4, no murmur, click or rub, no peripheral edema and peripheral pulses strong  ABDOMEN: soft, nontender, no hepatosplenomegaly, no masses and bowel sounds normal  MS: " Prepatellar bursa swelling appreciated on examination. Range of motion is pretty normal and there is no tenderness on palpation of the anterior joint line. Lateral stress test exquisitely positive probably indicating LCL pathology. Meniscal abnormalities cannot be ruled out.    Diagnostic Test Results:  Results for orders placed or performed in visit on 05/10/17   Lipid panel reflex to direct LDL   Result Value Ref Range    Cholesterol 185 <200 mg/dL    Triglycerides 49 <150 mg/dL    HDL Cholesterol 73 >49 mg/dL    LDL Cholesterol Calculated 102 (H) <100 mg/dL    Non HDL Cholesterol 112 <130 mg/dL   Comprehensive metabolic panel   Result Value Ref Range    Sodium 142 133 - 144 mmol/L    Potassium 4.3 3.4 - 5.3 mmol/L    Chloride 109 94 - 109 mmol/L    Carbon Dioxide 26 20 - 32 mmol/L    Anion Gap 7 3 - 14 mmol/L    Glucose 86 70 - 99 mg/dL    Urea Nitrogen 12 7 - 30 mg/dL    Creatinine 0.76 0.52 - 1.04 mg/dL    GFR Estimate 75 >60 mL/min/1.7m2    GFR Estimate If Black >90   GFR Calc   >60 mL/min/1.7m2    Calcium 9.5 8.5 - 10.1 mg/dL    Bilirubin Total 0.7 0.2 - 1.3 mg/dL    Albumin 4.0 3.4 - 5.0 g/dL    Protein Total 7.2 6.8 - 8.8 g/dL    Alkaline Phosphatase 77 40 - 150 U/L    ALT 21 0 - 50 U/L    AST 15 0 - 45 U/L   CBC with platelets   Result Value Ref Range    WBC 3.4 (L) 4.0 - 11.0 10e9/L    RBC Count 4.15 3.8 - 5.2 10e12/L    Hemoglobin 12.9 11.7 - 15.7 g/dL    Hematocrit 38.7 35.0 - 47.0 %    MCV 93 78 - 100 fl    MCH 31.1 26.5 - 33.0 pg    MCHC 33.3 31.5 - 36.5 g/dL    RDW 13.0 10.0 - 15.0 %    Platelet Count 221 150 - 450 10e9/L   Vitamin D Deficiency   Result Value Ref Range    Vitamin D Deficiency screening 62 20 - 75 ug/L       ASSESSMENT/PLAN:     DIAGNOSIS/PLAN:     ICD-10-CM    1. Leukopenia, unspecified type D72.819 Blood Morphology Pathologist Review     CBC with platelets differential     Reticulocyte Count   2. Overweight (BMI 25.0-29.9) E66.3    3. Fatigue, unspecified type R53.83  cholecalciferol (VITAMIN D3) 10142 UNITS capsule   4. Vitamin D deficiency E55.9 cholecalciferol (VITAMIN D3) 83832 UNITS capsule   5. High serum ferritin R79.89 Ferritin     Transferrin     Iron and iron binding capacity     Comprehensive metabolic panel   6. Post-traumatic osteoarthritis of right knee M17.31 MR Knee Right w/o Contrast       SIGNIFICANT ISSUES RE The primary encounter diagnosis was Leukopenia, unspecified type. Diagnoses of Overweight (BMI 25.0-29.9), Fatigue, unspecified type, Vitamin D deficiency, High serum ferritin, and Post-traumatic osteoarthritis of right knee were also pertinent to this visit. AS NOTED AND ADDRESSED ABOVE   MEDS AND AND LABS AS ORDERED TO ADDRESS PREVIOUS AND CURRENT ABNORMAL INDICES    Justine is doing very well with regards to weight loss and maintenance of healthy lifestyle. She continues to do well off of phentermine after having lost a total of 70 pounds which is pretty impressive! We will hold off phentermine for now and possibly permanently given her age and the fact that she is maintaining weight loss pretty well.    Other issues that were addressed today include further workup for leukopenia and elevated ferritin which has been ongoing for the past 12-18 months. I suspect that leukopenia is probably physiologic and that ferritinemia may be leading to inflammation as ferritin can also be an acute phase reactant especially given her history of active arthritis. Labs have been ordered today for further evaluation to include blood morphology, CBC with differential, complete metabolic profile, and iron studies.    An MRI of the right knee is also ordered given ongoing symptoms with swelling and stiffness of the right knee in the setting of chronic osteoarthritis. Physical exam points to lateral collateral ligament problem and hopefully the MRI which was what going on. She will also make a follow-up appointment with Dr. Hair at [facility] orthopedics.    SEE PATIENT  INSTRUCTION SECTION FOR FOLLOW UP PLAN    Justine IS TO CONTINUE OTHER TREATMENT REGIMEN/PLANS EXCEPT AS INDICATED    COMPLIANCE WITH MEDICATIONS DIET AND EXERCISE PLANS ENCOURAGED    DISCONTINUED MEDS:  Medications Discontinued During This Encounter   Medication Reason     phentermine 30 MG capsule      cholecalciferol (VITAMIN D3) 86161 UNITS capsule Reorder     Psyllium (METAMUCIL PO)        CURRENT MED LIST WITH CHANGES AS NOTED BELOW:  Current Outpatient Prescriptions   Medication Sig Dispense Refill     cholecalciferol (VITAMIN D3) 67589 UNITS capsule Take 1 capsule (50,000 Units) by mouth every 14 days TAKING ONE TAB TWICE A MONTH 40 capsule 0     Turmeric Curcumin 500 MG CAPS Take 1 capsule by mouth 2 times daily INDICATION: ARTHRITIS 100 capsule PRN     B Complex-C (SUPER B COMPLEX PO) Take by mouth daily       MAGNESIUM OXIDE PO Take 400 mg by mouth At Bedtime        FIBER PO        Cyanocobalamin (B-12 SUPER STRENGTH) 5000 MCG/ML LIQD Place 0.5 mLs under the tongue daily FOR VITAMIN B12 SUPPLEMENTATION, PLEASE PLACE UNDER THE TONGUE 2 Bottle PRN     calcium carbonate (TUMS CALCIUM FOR LIFE BONE) 750 MG CHEW Take 1 tablet (750 mg) by mouth 2 times daily (with meals) (Patient taking differently: Take 1,500 mg by mouth 2 times daily (with meals) ) 90 tablet          Office visit time > 40 mins, greater than 50% of which was spent obtaining history, reviewing medications, counseling re compliance with treatment plan, discussion of treatment, follow up plans, and coordination of care.       Patient Instructions   ** FOLLOW UP PLAN**:    PLEASE SCHEDULE E-VISIT 1 WEEK FROM TODAY TO FOLLOW UP ON Test and MRI results     TO INITIATE AN e-VISIT PLEASE GO UNDER THE SECURE ASSET4AGING TAB IN [employer] AND CLICK ON REQUEST e-VISIT TAB      YOU HAVE ALSO BEEN REFERRED FOR ORTHOPEDICS reevaluation TO ADDRESS ISSUES RAISED TODAY REGARDING POSSIBLE LEFT COLLATERAL LIGAMENT ISSUES OF THE RIGHT KNEE      You will be contacted  by radiology scheduling regarding your MRI      YOU MAY CONTACT THE CLINIC IF ANY QUESTIONS OR CONCERNS -[phone] OR VIA MCKINLEY Hernandez MD  [facility]

## 2017-09-05 NOTE — NURSING NOTE
"Chief Complaint   Patient presents with     Weight Loss       Initial /72  Pulse 73  Temp 98.8  F (37.1  C) (Oral)  Ht 5' 8\" (1.727 m)  Wt 188 lb 1.6 oz (85.3 kg)  SpO2 99%  BMI 28.6 kg/m2 Estimated body mass index is 28.6 kg/(m^2) as calculated from the following:    Height as of this encounter: 5' 8\" (1.727 m).    Weight as of this encounter: 188 lb 1.6 oz (85.3 kg).  Medication Reconciliation: complete   Marilyn Rangel MA   "

## 2017-09-06 ENCOUNTER — TELEPHONE (OUTPATIENT)
Dept: INTERNAL MEDICINE | Facility: CLINIC | Age: 72
End: 2017-09-06

## 2017-09-06 LAB — COPATH REPORT: NORMAL

## 2017-09-07 ENCOUNTER — HOSPITAL ENCOUNTER (OUTPATIENT)
Dept: MRI IMAGING | Facility: CLINIC | Age: 72
Discharge: HOME | End: 2017-09-07
Attending: INTERNAL MEDICINE | Admitting: INTERNAL MEDICINE
Payer: MEDICARE

## 2017-09-07 DIAGNOSIS — M17.31 POST-TRAUMATIC OSTEOARTHRITIS OF RIGHT KNEE: ICD-10-CM

## 2017-09-07 PROCEDURE — 73721 MRI JNT OF LWR EXTRE W/O DYE: CPT | Mod: RT

## 2017-09-08 ENCOUNTER — MYC MEDICAL ADVICE (OUTPATIENT)
Dept: INTERNAL MEDICINE | Facility: CLINIC | Age: 72
End: 2017-09-08

## 2017-09-11 LAB
DIFFERENTIAL METHOD BLD: NORMAL
EOSINOPHIL # BLD AUTO: 0.2 10E9/L (ref 0–0.7)
EOSINOPHIL NFR BLD AUTO: 4 %
ERYTHROCYTE [DISTWIDTH] IN BLOOD BY AUTOMATED COUNT: 13.4 % (ref 10–15)
HCT VFR BLD AUTO: 40.6 % (ref 35–47)
HGB BLD-MCNC: 13 G/DL (ref 11.7–15.7)
LYMPHOCYTES # BLD AUTO: 1.7 10E9/L (ref 0.8–5.3)
LYMPHOCYTES NFR BLD AUTO: 35 %
MCH RBC QN AUTO: 30.8 PG (ref 26.5–33)
MCHC RBC AUTO-ENTMCNC: 32 G/DL (ref 31.5–36.5)
MCV RBC AUTO: 96 FL (ref 78–100)
MONOCYTES # BLD AUTO: 0.3 10E9/L (ref 0–1.3)
MONOCYTES NFR BLD AUTO: 7 %
NEUTROPHILS # BLD AUTO: 2.6 10E9/L (ref 1.6–8.3)
NEUTROPHILS NFR BLD AUTO: 54 %
PLATELET # BLD AUTO: 225 10E9/L (ref 150–450)
RBC # BLD AUTO: 4.22 10E12/L (ref 3.8–5.2)
WBC # BLD AUTO: 4.8 10E9/L (ref 4–11)

## 2017-09-12 NOTE — PROGRESS NOTES
Recommendations regarding test results communicated to patient. I suspect ferritin is elevated as an acute phase reactant given significant OA  Of knee.

## 2018-02-16 ENCOUNTER — OFFICE VISIT (OUTPATIENT)
Dept: URGENT CARE | Facility: URGENT CARE | Age: 73
End: 2018-02-16
Payer: COMMERCIAL

## 2018-02-16 VITALS
BODY MASS INDEX: 31.8 KG/M2 | SYSTOLIC BLOOD PRESSURE: 146 MMHG | WEIGHT: 209.13 LBS | DIASTOLIC BLOOD PRESSURE: 80 MMHG | HEART RATE: 70 BPM | OXYGEN SATURATION: 99 % | TEMPERATURE: 98.2 F | RESPIRATION RATE: 20 BRPM

## 2018-02-16 DIAGNOSIS — R10.32 LLQ ABDOMINAL PAIN: Primary | ICD-10-CM

## 2018-02-16 DIAGNOSIS — K57.32 DIVERTICULITIS OF COLON: ICD-10-CM

## 2018-02-16 LAB
ALBUMIN UR-MCNC: NEGATIVE MG/DL
APPEARANCE UR: CLEAR
BASOPHILS # BLD AUTO: 0 10E9/L (ref 0–0.2)
BASOPHILS NFR BLD AUTO: 0.2 %
BILIRUB UR QL STRIP: NEGATIVE
COLOR UR AUTO: YELLOW
DIFFERENTIAL METHOD BLD: NORMAL
EOSINOPHIL # BLD AUTO: 0.2 10E9/L (ref 0–0.7)
EOSINOPHIL NFR BLD AUTO: 2.3 %
GLUCOSE UR STRIP-MCNC: NEGATIVE MG/DL
HGB UR QL STRIP: NEGATIVE
KETONES UR STRIP-MCNC: NEGATIVE MG/DL
LEUKOCYTE ESTERASE UR QL STRIP: NEGATIVE
LYMPHOCYTES # BLD AUTO: 1.4 10E9/L (ref 0.8–5.3)
LYMPHOCYTES NFR BLD AUTO: 16.2 %
MONOCYTES # BLD AUTO: 0.5 10E9/L (ref 0–1.3)
MONOCYTES NFR BLD AUTO: 5.4 %
NEUTROPHILS # BLD AUTO: 6.4 10E9/L (ref 1.6–8.3)
NEUTROPHILS NFR BLD AUTO: 75.9 %
NITRATE UR QL: NEGATIVE
PH UR STRIP: 5.5 PH (ref 5–7)
RBC #/AREA URNS AUTO: NORMAL /HPF
SOURCE: NORMAL
SP GR UR STRIP: 1.01 (ref 1–1.03)
UROBILINOGEN UR STRIP-ACNC: 0.2 EU/DL (ref 0.2–1)
WBC # BLD AUTO: 8.4 10E9/L (ref 4–11)
WBC #/AREA URNS AUTO: NORMAL /HPF

## 2018-02-16 PROCEDURE — 85004 AUTOMATED DIFF WBC COUNT: CPT | Performed by: FAMILY MEDICINE

## 2018-02-16 PROCEDURE — 36415 COLL VENOUS BLD VENIPUNCTURE: CPT | Performed by: FAMILY MEDICINE

## 2018-02-16 PROCEDURE — 81001 URINALYSIS AUTO W/SCOPE: CPT | Performed by: FAMILY MEDICINE

## 2018-02-16 PROCEDURE — 99213 OFFICE O/P EST LOW 20 MIN: CPT | Performed by: FAMILY MEDICINE

## 2018-02-16 PROCEDURE — 85048 AUTOMATED LEUKOCYTE COUNT: CPT | Performed by: FAMILY MEDICINE

## 2018-02-16 RX ORDER — METRONIDAZOLE 500 MG/1
500 TABLET ORAL 3 TIMES DAILY
Qty: 30 TABLET | Refills: 0 | Status: SHIPPED | OUTPATIENT
Start: 2018-02-16 | End: 2018-02-26

## 2018-02-16 RX ORDER — CIPROFLOXACIN 500 MG/1
500 TABLET, FILM COATED ORAL 2 TIMES DAILY
Qty: 20 TABLET | Refills: 0 | Status: SHIPPED | OUTPATIENT
Start: 2018-02-16 | End: 2018-02-26

## 2018-02-16 NOTE — NURSING NOTE
"Chief Complaint   Patient presents with     Abdominal Pain     lt sided abd pain,denies any nausea or vomiting. Has hx of diverticulitis.       Initial /80 (Cuff Size: Adult Large)  Pulse 70  Temp 98.2  F (36.8  C) (Oral)  Resp 20  Wt 209 lb 2 oz (94.9 kg)  SpO2 99%  BMI 31.8 kg/m2 Estimated body mass index is 31.8 kg/(m^2) as calculated from the following:    Height as of 9/5/17: 5' 8\" (1.727 m).    Weight as of this encounter: 209 lb 2 oz (94.9 kg).  Medication Reconciliation: complete [place] [facility]    "

## 2018-02-16 NOTE — MR AVS SNAPSHOT
After Visit Summary   2/16/2018    Justine Michelle    MRN: [id number]           Patient Information     Date Of Birth          1945        Visit Information        Provider Department      2/16/2018 2:30 PM Anaya Ni MD [facility]        Today's Diagnoses     LLQ abdominal pain    -  1    Diverticulitis of colon           Follow-ups after your visit        Who to contact     If you have questions or need follow up information about today's clinic visit or your schedule please contact [facility] directly at [phone].  Normal or non-critical lab and imaging results will be communicated to you by MyChart, letter or phone within 4 business days after the clinic has received the results. If you do not hear from us within 7 days, please contact the clinic through unamiahart or phone. If you have a critical or abnormal lab result, we will notify you by phone as soon as possible.  Submit refill requests through [employer] or call your pharmacy and they will forward the refill request to us. Please allow 3 business days for your refill to be completed.          Additional Information About Your Visit        MyChart Information     [employer] gives you secure access to your electronic health record. If you see a primary care provider, you can also send messages to your care team and make appointments. If you have questions, please call your primary care clinic.  If you do not have a primary care provider, please call [phone] and they will assist you.        Care EveryWhere ID     This is your Care EveryWhere ID. This could be used by other organizations to access your [facility] medical records  [id number]        Your Vitals Were     Pulse Temperature Respirations Pulse Oximetry BMI (Body Mass Index)       70 98.2  F (36.8  C) (Oral) 20 99% 31.8 kg/m2        Blood Pressure from Last 3 Encounters:   02/16/18 146/80   09/05/17 116/72    05/04/17 110/70    Weight from Last 3 Encounters:   02/16/18 209 lb 2 oz (94.9 kg)   09/05/17 188 lb 1.6 oz (85.3 kg)   05/04/17 183 lb (83 kg)              We Performed the Following     UA with Microscopic     WBC with Diff          Today's Medication Changes          These changes are accurate as of 2/16/18  3:42 PM.  If you have any questions, ask your nurse or doctor.               Start taking these medicines.        Dose/Directions    ciprofloxacin 500 MG tablet   Commonly known as:  CIPRO   Used for:  Diverticulitis of colon   Started by:  Anaya Ni MD        Dose:  500 mg   Take 1 tablet (500 mg) by mouth 2 times daily for 10 days   Quantity:  20 tablet   Refills:  0       metroNIDAZOLE 500 MG tablet   Commonly known as:  FLAGYL   Used for:  LLQ abdominal pain, Diverticulitis of colon   Started by:  Anaya Ni MD        Dose:  500 mg   Take 1 tablet (500 mg) by mouth 3 times daily for 10 days   Quantity:  30 tablet   Refills:  0         These medicines have changed or have updated prescriptions.        Dose/Directions    calcium carbonate 750 MG Chew   Commonly known as:  TUMS CALCIUM FOR LIFE BONE   This may have changed:  how much to take   Used for:  Vitamin D deficiency        Dose:  750 mg   Take 1 tablet (750 mg) by mouth 2 times daily (with meals)   Quantity:  90 tablet   Refills:  0            Where to get your medicines      These medications were sent to [facility] [address] [address]     Phone:  [phone]     ciprofloxacin 500 MG tablet    metroNIDAZOLE 500 MG tablet                Primary Care Provider Office Phone # Fax #    Adriana Hernandez [phone]       XXX RESIGNED XXX  [facility] [id number]        Equal Access to Services     MATEUS DIAMOND AH: Marco A Olsen, edith spencer, ahmet mays. So [facility]  [phone].    ATENCIÓN: Si jefferson taylor, tiene a lopez disposición servicios gratuitos de asistencia lingüística. Julissa rodrigues [phone].    We comply with applicable federal civil rights laws and [place] laws. We do not discriminate on the basis of race, color, national origin, age, disability, sex, sexual orientation, or gender identity.            Thank you!     Thank you for choosing [facility]  for your care. Our goal is always to provide you with excellent care. Hearing back from our patients is one way we can continue to improve our services. Please take a few minutes to complete the written survey that you may receive in the mail after your visit with us. Thank you!             Your Updated Medication List - Protect others around you: Learn how to safely use, store and throw away your medicines at www.disposemymeds.org.          This list is accurate as of 2/16/18  3:42 PM.  Always use your most recent med list.                   Brand Name Dispense Instructions for use Diagnosis    calcium carbonate 750 MG Chew    TUMS CALCIUM FOR LIFE BONE    90 tablet    Take 1 tablet (750 mg) by mouth 2 times daily (with meals)    Vitamin D deficiency       cholecalciferol 18288 UNITS capsule    VITAMIN D3    40 capsule    Take 1 capsule (50,000 Units) by mouth every 14 days TAKING ONE TAB TWICE A MONTH    Fatigue, unspecified type, Vitamin D deficiency       ciprofloxacin 500 MG tablet    CIPRO    20 tablet    Take 1 tablet (500 mg) by mouth 2 times daily for 10 days    Diverticulitis of colon       Cyanocobalamin 5000 MCG/ML Liqd    B-12 SUPER STRENGTH    2 Bottle    Place 0.5 mLs under the tongue daily FOR VITAMIN B12 SUPPLEMENTATION, PLEASE PLACE UNDER THE TONGUE    Vitamin B12 deficiency without anemia       FIBER PO           MAGNESIUM OXIDE PO      Take 400 mg by mouth At Bedtime        metroNIDAZOLE 500 MG tablet    FLAGYL    30 tablet    Take 1 tablet (500 mg) by mouth 3 times  daily for 10 days    LLQ abdominal pain, Diverticulitis of colon       SUPER B COMPLEX PO      Take by mouth daily        Turmeric Curcumin 500 MG Caps     100 capsule    Take 1 capsule by mouth 2 times daily INDICATION: ARTHRITIS    Chronic pain of right knee

## 2018-02-16 NOTE — PROGRESS NOTES
Chief Complaint   Patient presents with     Abdominal Pain     lt sided abd pain,denies any nausea or vomiting. Has hx of diverticulitis.         SUBJECTIVE  HPI:  Justine Michelle is a 72 year old female who presents with the CC of abdominal/pelvic pain.    Pain is located in the suprapubic and LLQ area, with radiation to None.  The pain is characterized as aching and sharp, and at worst is a level 7 on a scale of 1-10.  Pain has been present for 7 day(s) and is stable.  EXACERBATING FACTORS: movement/walking  RELIEVING FACTORS: remaining still.  ASSOCIATED SX: none.  She has h/o diverticula and also has h/o diverticulitis in the past     Past Medical History:   Diagnosis Date     Absence of menstruation 2006    not on HRT     Acute abdominal pain 4/16/2013     Diverticulitis of colon 4/16/2013    also 2003, 2007     Diverticulosis of colon (without mention of hemorrhage)     colonoscopy due in 2013     Myalgia and myositis, unspecified      Obesity, unspecified      Other and unspecified hyperlipidemia      Palpitations      Current Outpatient Prescriptions   Medication Sig Dispense Refill     metroNIDAZOLE (FLAGYL) 500 MG tablet Take 1 tablet (500 mg) by mouth 3 times daily for 10 days 30 tablet 0     ciprofloxacin (CIPRO) 500 MG tablet Take 1 tablet (500 mg) by mouth 2 times daily for 10 days 20 tablet 0     cholecalciferol (VITAMIN D3) 77528 UNITS capsule Take 1 capsule (50,000 Units) by mouth every 14 days TAKING ONE TAB TWICE A MONTH 40 capsule 0     B Complex-C (SUPER B COMPLEX PO) Take by mouth daily       Cyanocobalamin (B-12 SUPER STRENGTH) 5000 MCG/ML LIQD Place 0.5 mLs under the tongue daily FOR VITAMIN B12 SUPPLEMENTATION, PLEASE PLACE UNDER THE TONGUE 2 Bottle PRN     calcium carbonate (TUMS CALCIUM FOR LIFE BONE) 750 MG CHEW Take 1 tablet (750 mg) by mouth 2 times daily (with meals) (Patient taking differently: Take 1,500 mg by mouth 2 times daily (with meals) ) 90 tablet      Turmeric Curcumin  500 MG CAPS Take 1 capsule by mouth 2 times daily INDICATION: ARTHRITIS 100 capsule PRN     MAGNESIUM OXIDE PO Take 400 mg by mouth At Bedtime        FIBER PO        Social History   Substance Use Topics     Smoking status: Never Smoker     Smokeless tobacco: Never Used     Alcohol use Yes      Comment: minimal - on holidays       ROS:  Review of systems negative except as stated above.    OBJECTIVE:  /80 (Cuff Size: Adult Large)  Pulse 70  Temp 98.2  F (36.8  C) (Oral)  Resp 20  Wt 209 lb 2 oz (94.9 kg)  SpO2 99%  BMI 31.8 kg/m2  GENERAL APPEARANCE: healthy, alert and no distress  EYES: EOMI,  PERRL, conjunctiva clear  HENT: ear canals and TM's normal.  Nose and mouth without ulcers, erythema or lesions  NECK: supple, nontender, no lymphadenopathy  RESP: lungs clear to auscultation - no rales, rhonchi or wheezes  CV: regular rates and rhythm, normal S1 S2, no murmur noted  ABDOMEN:  Soft,LLQ and suprapubic area  Tender no HSM or masses and bowel sounds normal  No rigidity noted SKIN: no suspicious lesions or rashes    ASSESSMENT:  Justine was seen today for abdominal pain.    Diagnoses and all orders for this visit:    LLQ abdominal pain  -     UA with Microscopic  -     WBC with Diff  -     metroNIDAZOLE (FLAGYL) 500 MG tablet; Take 1 tablet (500 mg) by mouth 3 times daily for 10 days    Diverticulitis of colon  -     metroNIDAZOLE (FLAGYL) 500 MG tablet; Take 1 tablet (500 mg) by mouth 3 times daily for 10 days  -     ciprofloxacin (CIPRO) 500 MG tablet; Take 1 tablet (500 mg) by mouth 2 times daily for 10 days      Follow up if  symptoms fail to improve or worsens   Pt understood and agreed with plan       See Orders in Epic

## 2018-11-27 ASSESSMENT — ENCOUNTER SYMPTOMS
DIARRHEA: 0
JOINT SWELLING: 0
DIZZINESS: 0
FEVER: 0
EYE PAIN: 0
PALPITATIONS: 0
BREAST MASS: 0
CONSTIPATION: 0
HEARTBURN: 0
HEADACHES: 0
SORE THROAT: 0
DYSURIA: 0
HEMATURIA: 0
FREQUENCY: 0
NERVOUS/ANXIOUS: 0
ABDOMINAL PAIN: 0
MYALGIAS: 1
ARTHRALGIAS: 1
COUGH: 0
NAUSEA: 0
CHILLS: 0
HEMATOCHEZIA: 0
SHORTNESS OF BREATH: 0

## 2018-11-27 ASSESSMENT — ACTIVITIES OF DAILY LIVING (ADL): CURRENT_FUNCTION: NO ASSISTANCE NEEDED

## 2018-11-28 ENCOUNTER — OFFICE VISIT (OUTPATIENT)
Dept: INTERNAL MEDICINE | Facility: CLINIC | Age: 73
End: 2018-11-28
Payer: COMMERCIAL

## 2018-11-28 VITALS
HEART RATE: 73 BPM | RESPIRATION RATE: 12 BRPM | SYSTOLIC BLOOD PRESSURE: 144 MMHG | WEIGHT: 220 LBS | BODY MASS INDEX: 34.53 KG/M2 | TEMPERATURE: 98.1 F | HEIGHT: 67 IN | OXYGEN SATURATION: 95 % | DIASTOLIC BLOOD PRESSURE: 78 MMHG

## 2018-11-28 DIAGNOSIS — E66.09 CLASS 2 OBESITY DUE TO EXCESS CALORIES WITHOUT SERIOUS COMORBIDITY WITH BODY MASS INDEX (BMI) OF 35.0 TO 35.9 IN ADULT: ICD-10-CM

## 2018-11-28 DIAGNOSIS — H40.003 GLAUCOMA SUSPECT, BILATERAL: ICD-10-CM

## 2018-11-28 DIAGNOSIS — E54 VITAMIN C DEFICIENCY: ICD-10-CM

## 2018-11-28 DIAGNOSIS — R53.83 FATIGUE, UNSPECIFIED TYPE: ICD-10-CM

## 2018-11-28 DIAGNOSIS — Z23 NEED FOR SHINGLES VACCINE: ICD-10-CM

## 2018-11-28 DIAGNOSIS — R03.0 ELEVATED BP WITHOUT DIAGNOSIS OF HYPERTENSION: ICD-10-CM

## 2018-11-28 DIAGNOSIS — Z78.0 MENOPAUSE: ICD-10-CM

## 2018-11-28 DIAGNOSIS — E66.812 CLASS 2 OBESITY DUE TO EXCESS CALORIES WITHOUT SERIOUS COMORBIDITY WITH BODY MASS INDEX (BMI) OF 35.0 TO 35.9 IN ADULT: ICD-10-CM

## 2018-11-28 DIAGNOSIS — Z00.00 ENCOUNTER FOR PREVENTATIVE ADULT HEALTH CARE EXAMINATION: Primary | ICD-10-CM

## 2018-11-28 DIAGNOSIS — E55.9 VITAMIN D DEFICIENCY: ICD-10-CM

## 2018-11-28 LAB
ALBUMIN UR-MCNC: NEGATIVE MG/DL
APPEARANCE UR: CLEAR
BILIRUB UR QL STRIP: NEGATIVE
COLOR UR AUTO: YELLOW
ERYTHROCYTE [DISTWIDTH] IN BLOOD BY AUTOMATED COUNT: 13.5 % (ref 10–15)
GLUCOSE UR STRIP-MCNC: NEGATIVE MG/DL
HCT VFR BLD AUTO: 38.6 % (ref 35–47)
HGB BLD-MCNC: 12.4 G/DL (ref 11.7–15.7)
HGB UR QL STRIP: NEGATIVE
KETONES UR STRIP-MCNC: NEGATIVE MG/DL
LEUKOCYTE ESTERASE UR QL STRIP: NEGATIVE
MCH RBC QN AUTO: 30.8 PG (ref 26.5–33)
MCHC RBC AUTO-ENTMCNC: 32.1 G/DL (ref 31.5–36.5)
MCV RBC AUTO: 96 FL (ref 78–100)
NITRATE UR QL: NEGATIVE
PH UR STRIP: 5.5 PH (ref 5–7)
PLATELET # BLD AUTO: 199 10E9/L (ref 150–450)
RBC # BLD AUTO: 4.02 10E12/L (ref 3.8–5.2)
SOURCE: NORMAL
SP GR UR STRIP: 1.01 (ref 1–1.03)
UROBILINOGEN UR STRIP-ACNC: 0.2 EU/DL (ref 0.2–1)
WBC # BLD AUTO: 6 10E9/L (ref 4–11)

## 2018-11-28 PROCEDURE — 85027 COMPLETE CBC AUTOMATED: CPT | Performed by: INTERNAL MEDICINE

## 2018-11-28 PROCEDURE — 90750 HZV VACC RECOMBINANT IM: CPT | Performed by: INTERNAL MEDICINE

## 2018-11-28 PROCEDURE — 99000 SPECIMEN HANDLING OFFICE-LAB: CPT | Performed by: INTERNAL MEDICINE

## 2018-11-28 PROCEDURE — 99397 PER PM REEVAL EST PAT 65+ YR: CPT | Mod: 25 | Performed by: INTERNAL MEDICINE

## 2018-11-28 PROCEDURE — 36415 COLL VENOUS BLD VENIPUNCTURE: CPT | Performed by: INTERNAL MEDICINE

## 2018-11-28 PROCEDURE — 80053 COMPREHEN METABOLIC PANEL: CPT | Performed by: INTERNAL MEDICINE

## 2018-11-28 PROCEDURE — 81003 URINALYSIS AUTO W/O SCOPE: CPT | Performed by: INTERNAL MEDICINE

## 2018-11-28 PROCEDURE — 99213 OFFICE O/P EST LOW 20 MIN: CPT | Mod: 25 | Performed by: INTERNAL MEDICINE

## 2018-11-28 PROCEDURE — 80061 LIPID PANEL: CPT | Performed by: INTERNAL MEDICINE

## 2018-11-28 PROCEDURE — 82306 VITAMIN D 25 HYDROXY: CPT | Performed by: INTERNAL MEDICINE

## 2018-11-28 PROCEDURE — 82180 ASSAY OF ASCORBIC ACID: CPT | Mod: 90 | Performed by: INTERNAL MEDICINE

## 2018-11-28 PROCEDURE — 84443 ASSAY THYROID STIM HORMONE: CPT | Performed by: INTERNAL MEDICINE

## 2018-11-28 PROCEDURE — 90471 IMMUNIZATION ADMIN: CPT | Performed by: INTERNAL MEDICINE

## 2018-11-28 RX ORDER — ESTRADIOL 10 UG/1
10 INSERT VAGINAL
COMMUNITY
End: 2020-02-17

## 2018-11-28 RX ORDER — LATANOPROST 50 UG/ML
1 SOLUTION/ DROPS OPHTHALMIC AT BEDTIME
COMMUNITY

## 2018-11-28 ASSESSMENT — ENCOUNTER SYMPTOMS
PALPITATIONS: 0
NAUSEA: 0
JOINT SWELLING: 0
CONSTIPATION: 0
COUGH: 0
FEVER: 0
HEMATOCHEZIA: 0
DYSURIA: 0
HEADACHES: 0
CHILLS: 0
ARTHRALGIAS: 1
SHORTNESS OF BREATH: 0
FREQUENCY: 0
NERVOUS/ANXIOUS: 0
HEARTBURN: 0
DIZZINESS: 0
ABDOMINAL PAIN: 0
SORE THROAT: 0
EYE PAIN: 0
HEMATURIA: 0
DIARRHEA: 0
MYALGIAS: 1
BREAST MASS: 0

## 2018-11-28 ASSESSMENT — ACTIVITIES OF DAILY LIVING (ADL): CURRENT_FUNCTION: NO ASSISTANCE NEEDED

## 2018-11-28 NOTE — NURSING NOTE
"Vital signs:  Temp: 98.1  F (36.7  C) Temp src: Oral BP: 144/78 Pulse: 73   Resp: 12 SpO2: 95 %     Height: 5' 6.5\" (168.9 cm) Weight: 220 lb (99.8 kg)  Estimated body mass index is 34.98 kg/(m^2) as calculated from the following:    Height as of this encounter: 5' 6.5\" (1.689 m).    Weight as of this encounter: 220 lb (99.8 kg).          "

## 2018-11-28 NOTE — Clinical Note
Please, abstract mammogram from [facility] ( 06/18/18) Normal results

## 2018-11-28 NOTE — PROGRESS NOTES
"SUBJECTIVE:   Justine Michelle is a 73 year old female who presents for Preventive Visit.    Are you in the first 12 months of your Medicare coverage?  No    Annual Wellness Visit     In general, how would you rate your overall health?  Good    Frequency of exercise:  1 day/week    Duration of exercise:  15-30 minutes    Do you usually eat at least 4 servings of fruit and vegetables a day, include whole grains    & fiber and avoid regularly eating high fat or \"junk\" foods?  No    Taking medications regularly:  Yes    Medication side effects:  None    Ability to successfully perform activities of daily living:  No assistance needed    Home Safety:  No safety concerns identified    Hearing Impairment:  Difficulty understanding soft or whispered speech    In the past 6 months, have you been bothered by leaking of urine?  No    In general, how would you rate your overall mental or emotional health?  Excellent    PHQ-2 Total Score: 0    Additional concerns today:  Yes    Do you feel safe in your environment? Yes    Do you have a Health Care Directive? Yes: Patient states has Advance Directive and will bring in a copy to clinic.      Fall risk completed       Cognitive Screening   1) Repeat 3 items (Leader, Season, Table)    2) Clock draw: NORMAL  3) 3 item recall: Recalls 3 objects  Results: 3 items recalled: COGNITIVE IMPAIRMENT LESS LIKELY    Mini-CogTM Copyright S Eben. Licensed by the author for use in [facility]; reprinted with permission (josefina@.[facility]). All rights reserved.      Do you have sleep apnea, excessive snoring or daytime drowsiness?: yes    Reviewed and updated as needed this visit by clinical staff         Reviewed and updated as needed this visit by Provider        Social History   Substance Use Topics     Smoking status: Never Smoker     Smokeless tobacco: Never Used     Alcohol use Yes      Comment: minimal - on holidays       Alcohol Use 11/27/2018   If you drink alcohol do you " typically have greater than 3 drinks per day OR greater than 7 drinks per week? No           PROBLEMS TO ADD ON...    Has h/o vit D, C, B12 deficiency. On supplemental PO treatment.   Has felt better on the treatment. More energy, less muscle aches.   Has had increased stress with her 's medical condition. Has been less active , has gained weight. Has history of obesity. Was able to lose 70 lbs with diet and exercise, but now has gained again weight and is in obesity range.   Her BP has been elevated. When checked at home is usually controlled. Does not keep low salt diet.     Current providers sharing in care for this patient include:   Patient Care Team:  [facility] - General (Internal Medicine)    The following health maintenance items are reviewed in Epic and correct as of today:  Health Maintenance   Topic Date Due     FALL RISK ASSESSMENT  02/07/2018     TSH Q2 YEAR  05/02/2018     LIPID MONITORING Q1 YEAR  05/10/2018     MAMMO SCREEN Q2 YR (SYSTEM ASSIGNED)  06/24/2018     PHQ-2 Q1 YR  09/05/2018     ADVANCE DIRECTIVE PLANNING Q5 YRS  01/20/2020     TETANUS IMMUNIZATION (SYSTEM ASSIGNED)  10/01/2020     COLON CANCER SCREEN (SYSTEM ASSIGNED)  07/03/2024     DEXA SCAN SCREENING (SYSTEM ASSIGNED)  Completed     PNEUMOCOCCAL  Completed     HEPATITIS C SCREENING  Addressed     Labs reviewed in EPIC  BP Readings from Last 3 Encounters:   11/28/18 144/78   02/16/18 146/80   09/05/17 116/72    Wt Readings from Last 3 Encounters:   11/28/18 220 lb (99.8 kg)   02/16/18 209 lb 2 oz (94.9 kg)   09/05/17 188 lb 1.6 oz (85.3 kg)                  Mammogram Screening: Mammogram Screening: Patient over age 50, mutual decision to screen reflected in health maintenance.  Last 3 Pap and HPV Results:  seeing OBGYN for PAP and mammograms     Review of Systems   Constitutional: Negative for chills and fever.   HENT: Negative for congestion, ear pain, hearing loss and sore throat.    Eyes: Negative for  "pain and visual disturbance.   Respiratory: Negative for cough and shortness of breath.    Cardiovascular: Negative for chest pain, palpitations and peripheral edema.   Gastrointestinal: Negative for abdominal pain, constipation, diarrhea, heartburn, hematochezia and nausea.   Breasts:  Negative for tenderness, breast mass and discharge.   Genitourinary: Positive for urgency. Negative for dysuria, frequency, genital sores, hematuria, pelvic pain, vaginal bleeding and vaginal discharge.   Musculoskeletal: Positive for arthralgias and myalgias. Negative for joint swelling.   Neurological: Negative for dizziness and headaches.   Psychiatric/Behavioral: Negative for mood changes. The patient is not nervous/anxious.        OBJECTIVE:   There were no vitals taken for this visit. Estimated body mass index is 31.8 kg/(m^2) as calculated from the following:    Height as of 9/5/17: 5' 8\" (1.727 m).    Weight as of 2/16/18: 209 lb 2 oz (94.9 kg).  Physical Exam  GENERAL: healthy, alert and no distress  EYES: Eyes grossly normal to inspection, PERRL and conjunctivae and sclerae normal  HENT: ear canals and TM's normal, nose and mouth without ulcers or lesions  NECK: no adenopathy, no asymmetry, masses, or scars and thyroid normal to palpation  RESP: lungs clear to auscultation - no rales, rhonchi or wheezes  CV: regular rate and rhythm, normal S1 S2, no S3 or S4, no murmur, click or rub, no peripheral edema and peripheral pulses strong  ABDOMEN: soft, nontender, no hepatosplenomegaly, no masses and bowel sounds normal  MS: no gross musculoskeletal defects noted, no edema  SKIN: no suspicious lesions or rashes  NEURO: Normal strength and tone, mentation intact and speech normal  PSYCH: mentation appears normal, affect normal/bright    Diagnostic Test Results:  none     ASSESSMENT / PLAN:       ICD-10-CM    1. Encounter for preventative adult health care examination Z00.00 CBC with platelets     Comprehensive metabolic panel     " "Lipid panel reflex to direct LDL Fasting     TSH with free T4 reflex     *UA reflex to Microscopic     Vitamin D Deficiency     Vitamin C   2. Glaucoma suspect, bilateral H40.003    3. Fatigue, unspecified type R53.83 vitamin D3 (CHOLECALCIFEROL) 58912 units capsule     CBC with platelets     Comprehensive metabolic panel     OFFICE/OUTPT VISIT,EST,LEVL III   4. Vitamin D deficiency E55.9 vitamin D3 (CHOLECALCIFEROL) 10098 units capsule     Vitamin D Deficiency     OFFICE/OUTPT VISIT,EST,LEVL III   5. Menopause Z78.0 DX Hip/Pelvis/Spine   6. Vitamin C deficiency E54 Vitamin C     OFFICE/OUTPT VISIT,EST,LEVL III   7. Need for shingles vaccine Z23 ZOSTER VACCINE RECOMBINANT ADJUVANTED IM NJX   8. Class 2 obesity due to excess calories without serious comorbidity with body mass index (BMI) of 35.0 to 35.9 in adult E66.09 OFFICE/OUTPT VISIT,EST,LEVL III    Z68.35    9. Elevated BP without diagnosis of hypertension R03.0 OFFICE/OUTPT VISIT,EST,LEVL III     Assess vitamins levels   Monitor BP, keep low salt diet, recheck in one month   Weight loss advised  Assess DEXA scan       End of Life Planning:  Patient currently has an advanced directive: Yes.  Practitioner is supportive of decision.    COUNSELING:  Reviewed preventive health counseling, as reflected in patient instructions       Regular exercise       Healthy diet/nutrition       Vision screening       Hearing screening       Dental care       Colon cancer screening    BP Readings from Last 1 Encounters:   02/16/18 146/80     Estimated body mass index is 31.8 kg/(m^2) as calculated from the following:    Height as of 9/5/17: 5' 8\" (1.727 m).    Weight as of 2/16/18: 209 lb 2 oz (94.9 kg).    BP Screening:   Last 3 BP Readings:    BP Readings from Last 3 Encounters:   11/28/18 144/78   02/16/18 146/80   09/05/17 116/72       The following was recommended to the patient:  Re-screen within 4 weeks and recommend lifestyle modifications  Weight management plan: " Discussed healthy diet and exercise guidelines     reports that she has never smoked. She has never used smokeless tobacco.      Appropriate preventive services were discussed with this patient, including applicable screening as appropriate for cardiovascular disease, diabetes, osteopenia/osteoporosis, and glaucoma.  As appropriate for age/gender, discussed screening for colorectal cancer, prostate cancer, breast cancer, and cervical cancer. Checklist reviewing preventive services available has been given to the patient.    Reviewed patients plan of care and provided an AVS. The Intermediate Care Plan ( asthma action plan, low back pain action plan, and migraine action plan) for Justine meets the Care Plan requirement. This Care Plan has been established and reviewed with the Patient.    Counseling Resources:  ATP IV Guidelines  Pooled Cohorts Equation Calculator  Breast Cancer Risk Calculator  FRAX Risk Assessment  ICSI Preventive Guidelines  Dietary Guidelines for Americans, 2010  USDA's MyPlate  ASA Prophylaxis  Lung CA Screening    Corey Ambrose MD  [facility]

## 2018-11-28 NOTE — MR AVS SNAPSHOT
After Visit Summary   11/28/2018    Justine Michelle    MRN: [id number]           Patient Information     Date Of Birth          1945        Visit Information        Provider Department      11/28/2018 8:00 AM Corey Ambrose MD [facility]        Today's Diagnoses     Encounter for preventative adult health care examination    -  1    Glaucoma suspect, bilateral        Fatigue, unspecified type        Vitamin D deficiency        Menopause        Vitamin C deficiency           Follow-ups after your visit        Future tests that were ordered for you today     Open Future Orders        Priority Expected Expires Ordered    DX Hip/Pelvis/Spine Routine  11/28/2019 11/28/2018            Who to contact     If you have questions or need follow up information about today's clinic visit or your schedule please contact [facility] directly at [phone].  Normal or non-critical lab and imaging results will be communicated to you by MyChart, letter or phone within 4 business days after the clinic has received the results. If you do not hear from us within 7 days, please contact the clinic through MyChart or phone. If you have a critical or abnormal lab result, we will notify you by phone as soon as possible.  Submit refill requests through [employer] or call your pharmacy and they will forward the refill request to us. Please allow 3 business days for your refill to be completed.          Additional Information About Your Visit        MyChart Information     [employer] gives you secure access to your electronic health record. If you see a primary care provider, you can also send messages to your care team and make appointments. If you have questions, please call your primary care clinic.  If you do not have a primary care provider, please call [phone] and they will assist you.        Care EveryWhere ID     This is your Care EveryWhere ID. This could be used by other  "organizations to access your [place] medical records  [id number]        Your Vitals Were     Pulse Temperature Respirations Height Pulse Oximetry BMI (Body Mass Index)    73 98.1  F (36.7  C) (Oral) 12 5' 6.5\" (1.689 m) 95% 34.98 kg/m2       Blood Pressure from Last 3 Encounters:   11/28/18 144/78   02/16/18 146/80   09/05/17 116/72    Weight from Last 3 Encounters:   11/28/18 220 lb (99.8 kg)   02/16/18 209 lb 2 oz (94.9 kg)   09/05/17 188 lb 1.6 oz (85.3 kg)              We Performed the Following     *UA reflex to Microscopic     CBC with platelets     Comprehensive metabolic panel     Lipid panel reflex to direct LDL Fasting     TSH with free T4 reflex     Vitamin C     Vitamin D Deficiency          Today's Medication Changes          These changes are accurate as of 11/28/18  8:48 AM.  If you have any questions, ask your nurse or doctor.               These medicines have changed or have updated prescriptions.        Dose/Directions    calcium carbonate 750 MG Chew   Commonly known as:  TUMS CALCIUM FOR LIFE BONE   This may have changed:  how much to take   Used for:  Vitamin D deficiency        Dose:  750 mg   Take 1 tablet (750 mg) by mouth 2 times daily (with meals)   Quantity:  90 tablet   Refills:  0            Where to get your medicines      These medications were sent to [facility] [address] [place] [address] AT [facility] [address] [address]    Hours:  24-hours Phone:  [phone]     vitamin D3 44811 units capsule                Primary Care Provider Office Phone # Fax #    [facility] [phone]       [address] [place] [address]        Equal Access to Services     MATEUS DIAMOND AH: Marco A Olsen, waazamda lulai, qaybta kaalmada nolan, ahmet reddy. Jimena [facility] [phone].    ATENCIÓN: Si habla español, tiene a lopez disposición servicios " nellie de asistencia lingüística. Julissa rodrigues [phone].    We comply with applicable federal civil rights laws and [place] laws. We do not discriminate on the basis of race, color, national origin, age, disability, sex, sexual orientation, or gender identity.            Thank you!     Thank you for choosing [facility]  for your care. Our goal is always to provide you with excellent care. Hearing back from our patients is one way we can continue to improve our services. Please take a few minutes to complete the written survey that you may receive in the mail after your visit with us. Thank you!             Your Updated Medication List - Protect others around you: Learn how to safely use, store and throw away your medicines at www.disposemymeds.org.          This list is accurate as of 11/28/18  8:48 AM.  Always use your most recent med list.                   Brand Name Dispense Instructions for use Diagnosis    calcium carbonate 750 MG Chew    TUMS CALCIUM FOR LIFE BONE    90 tablet    Take 1 tablet (750 mg) by mouth 2 times daily (with meals)    Vitamin D deficiency       Cyanocobalamin 5000 MCG/ML Liqd    B-12 SUPER STRENGTH    2 Bottle    Place 0.5 mLs under the tongue daily FOR VITAMIN B12 SUPPLEMENTATION, PLEASE PLACE UNDER THE TONGUE    Vitamin B12 deficiency without anemia       estradiol 10 MCG Tabs vaginal tablet    VAGIFEM     Place 10 mcg vaginally twice a week        FIBER PO           latanoprost 0.005 % ophthalmic solution    XALATAN     Place 1 drop into the right eye daily        SUPER B COMPLEX PO      Take by mouth daily        vitamin D3 00717 units capsule    CHOLECALCIFEROL    24 capsule    Take 1 capsule (50,000 Units) by mouth every 14 days TAKING ONE TAB TWICE A MONTH    Fatigue, unspecified type, Vitamin D deficiency

## 2018-11-29 LAB
ALBUMIN SERPL-MCNC: 3.9 G/DL (ref 3.4–5)
ALP SERPL-CCNC: 78 U/L (ref 40–150)
ALT SERPL W P-5'-P-CCNC: 21 U/L (ref 0–50)
ANION GAP SERPL CALCULATED.3IONS-SCNC: 9 MMOL/L (ref 3–14)
AST SERPL W P-5'-P-CCNC: 18 U/L (ref 0–45)
BILIRUB SERPL-MCNC: 0.7 MG/DL (ref 0.2–1.3)
BUN SERPL-MCNC: 16 MG/DL (ref 7–30)
CALCIUM SERPL-MCNC: 9.8 MG/DL (ref 8.5–10.1)
CHLORIDE SERPL-SCNC: 106 MMOL/L (ref 94–109)
CHOLEST SERPL-MCNC: 198 MG/DL
CO2 SERPL-SCNC: 25 MMOL/L (ref 20–32)
CREAT SERPL-MCNC: 0.81 MG/DL (ref 0.52–1.04)
DEPRECATED CALCIDIOL+CALCIFEROL SERPL-MC: 78 UG/L (ref 20–75)
GFR SERPL CREATININE-BSD FRML MDRD: 69 ML/MIN/1.7M2
GLUCOSE SERPL-MCNC: 99 MG/DL (ref 70–99)
HDLC SERPL-MCNC: 70 MG/DL
LDLC SERPL CALC-MCNC: 113 MG/DL
NONHDLC SERPL-MCNC: 128 MG/DL
POTASSIUM SERPL-SCNC: 3.9 MMOL/L (ref 3.4–5.3)
PROT SERPL-MCNC: 7.4 G/DL (ref 6.8–8.8)
SODIUM SERPL-SCNC: 140 MMOL/L (ref 133–144)
TRIGL SERPL-MCNC: 76 MG/DL
TSH SERPL DL<=0.005 MIU/L-ACNC: 1.81 MU/L (ref 0.4–4)

## 2018-12-06 LAB — VIT C SERPL-MCNC: 61 UMOL/L (ref 23–114)

## 2019-01-07 ENCOUNTER — MEDICAL CORRESPONDENCE (OUTPATIENT)
Dept: HEALTH INFORMATION MANAGEMENT | Facility: CLINIC | Age: 74
End: 2019-01-07

## 2019-01-07 ENCOUNTER — ALLIED HEALTH/NURSE VISIT (OUTPATIENT)
Dept: NURSING | Facility: CLINIC | Age: 74
End: 2019-01-07
Payer: COMMERCIAL

## 2019-01-07 VITALS — DIASTOLIC BLOOD PRESSURE: 64 MMHG | SYSTOLIC BLOOD PRESSURE: 118 MMHG

## 2019-01-07 DIAGNOSIS — R03.0 ELEVATED BP WITHOUT DIAGNOSIS OF HYPERTENSION: Primary | ICD-10-CM

## 2019-01-07 NOTE — NURSING NOTE
Patient states she is under much stress lately due to  ill health, have decrease exercise and gained some weight.  However, she is starting to increase exercise, watch her diet and losing some weight.  And she just came back from a trip. 's health has improved some too.

## 2019-01-14 ENCOUNTER — ANCILLARY PROCEDURE (OUTPATIENT)
Dept: BONE DENSITY | Facility: CLINIC | Age: 74
End: 2019-01-14
Attending: INTERNAL MEDICINE
Payer: COMMERCIAL

## 2019-01-14 DIAGNOSIS — Z78.0 MENOPAUSE: ICD-10-CM

## 2019-01-14 PROCEDURE — 77080 DXA BONE DENSITY AXIAL: CPT | Performed by: INTERNAL MEDICINE

## 2019-12-09 ENCOUNTER — TRANSFERRED RECORDS (OUTPATIENT)
Dept: HEALTH INFORMATION MANAGEMENT | Facility: CLINIC | Age: 74
End: 2019-12-09

## 2020-01-06 ENCOUNTER — OFFICE VISIT (OUTPATIENT)
Dept: INTERNAL MEDICINE | Facility: CLINIC | Age: 75
End: 2020-01-06
Payer: COMMERCIAL

## 2020-01-06 ENCOUNTER — ANCILLARY PROCEDURE (OUTPATIENT)
Dept: GENERAL RADIOLOGY | Facility: CLINIC | Age: 75
End: 2020-01-06
Attending: INTERNAL MEDICINE
Payer: COMMERCIAL

## 2020-01-06 VITALS
HEIGHT: 67 IN | SYSTOLIC BLOOD PRESSURE: 158 MMHG | HEART RATE: 73 BPM | RESPIRATION RATE: 16 BRPM | WEIGHT: 225 LBS | OXYGEN SATURATION: 95 % | BODY MASS INDEX: 35.31 KG/M2 | TEMPERATURE: 98.3 F | DIASTOLIC BLOOD PRESSURE: 90 MMHG

## 2020-01-06 DIAGNOSIS — M54.50 MIDLINE LOW BACK PAIN WITHOUT SCIATICA, UNSPECIFIED CHRONICITY: ICD-10-CM

## 2020-01-06 DIAGNOSIS — M79.10 MYALGIA: ICD-10-CM

## 2020-01-06 DIAGNOSIS — R03.0 ELEVATED BP WITHOUT DIAGNOSIS OF HYPERTENSION: Primary | ICD-10-CM

## 2020-01-06 DIAGNOSIS — E55.9 VITAMIN D DEFICIENCY: ICD-10-CM

## 2020-01-06 LAB
ERYTHROCYTE [DISTWIDTH] IN BLOOD BY AUTOMATED COUNT: 13 % (ref 10–15)
ERYTHROCYTE [SEDIMENTATION RATE] IN BLOOD BY WESTERGREN METHOD: 25 MM/H (ref 0–30)
HCT VFR BLD AUTO: 40.7 % (ref 35–47)
HGB BLD-MCNC: 13.1 G/DL (ref 11.7–15.7)
MCH RBC QN AUTO: 30 PG (ref 26.5–33)
MCHC RBC AUTO-ENTMCNC: 32.2 G/DL (ref 31.5–36.5)
MCV RBC AUTO: 93 FL (ref 78–100)
PLATELET # BLD AUTO: 253 10E9/L (ref 150–450)
RBC # BLD AUTO: 4.36 10E12/L (ref 3.8–5.2)
WBC # BLD AUTO: 4.7 10E9/L (ref 4–11)

## 2020-01-06 PROCEDURE — 36415 COLL VENOUS BLD VENIPUNCTURE: CPT | Performed by: INTERNAL MEDICINE

## 2020-01-06 PROCEDURE — 82306 VITAMIN D 25 HYDROXY: CPT | Performed by: INTERNAL MEDICINE

## 2020-01-06 PROCEDURE — 84443 ASSAY THYROID STIM HORMONE: CPT | Performed by: INTERNAL MEDICINE

## 2020-01-06 PROCEDURE — 80053 COMPREHEN METABOLIC PANEL: CPT | Performed by: INTERNAL MEDICINE

## 2020-01-06 PROCEDURE — 72100 X-RAY EXAM L-S SPINE 2/3 VWS: CPT

## 2020-01-06 PROCEDURE — 99214 OFFICE O/P EST MOD 30 MIN: CPT | Performed by: INTERNAL MEDICINE

## 2020-01-06 PROCEDURE — 85027 COMPLETE CBC AUTOMATED: CPT | Performed by: INTERNAL MEDICINE

## 2020-01-06 PROCEDURE — 85652 RBC SED RATE AUTOMATED: CPT | Performed by: INTERNAL MEDICINE

## 2020-01-06 PROCEDURE — 82550 ASSAY OF CK (CPK): CPT | Performed by: INTERNAL MEDICINE

## 2020-01-06 ASSESSMENT — MIFFLIN-ST. JEOR: SCORE: 1549.25

## 2020-01-06 NOTE — LETTER
[facility]  [address], [address]  [place] [address]  [phone]        January 8, 2020    Justine Michelle  [address] [place] [address]            Dear Ms. Justine Michelle:      The results of your recent labs were NORMAL.      If you have any further questions or problems, please contact our office.      Sincerely,        Corey Ambrose M.D.

## 2020-01-06 NOTE — NURSING NOTE
"Vital signs:  Temp: 98.3  F (36.8  C) Temp src: Oral BP: (!) 158/90 Pulse: 73   Resp: 16 SpO2: 95 %     Height: 169.5 cm (5' 6.75\") Weight: 102.1 kg (225 lb)  Estimated body mass index is 35.5 kg/m  as calculated from the following:    Height as of this encounter: 1.695 m (5' 6.75\").    Weight as of this encounter: 102.1 kg (225 lb).        "

## 2020-01-06 NOTE — PROGRESS NOTES
Subjective     Justine Michelle is a 74 year old female who presents to clinic today for the following health issues:    HPI   Follow up:    Presents for follow up .   Concern for muscle pain, in the thighs, arms. Feels tired, achy.   Has h/o knees OA. Will need surgery in the future. Able to manage now with PRN OTC medications.   Feels stiffness, worse with longer ambulation .   Has h/o LBP, DDD. No neurologic deficits, no weakness, numbness, tingling in the LE.   Has had elevated BP on and off, not on treatment. No HA, SOB, CP.         Patient Active Problem List   Diagnosis     Myalgia and myositis     Diverticulosis of large intestine     Absence of menstruation     Acute abdominal pain     Diverticulitis of colon     Palpitations     Ascorbic acid deficiency     Vitamin B12 deficiency without anemia     Senile cataract     Elevated ferritin level     Vitamin D deficiency     Advanced directives, counseling/discussion     CARDIOVASCULAR SCREENING; LDL GOAL LESS THAN 160     Fatigue     Adult BMI 28.0-28.9 kg/sq m     Myalgia      Restless leg syndrome     Overweight (BMI 25.0-29.9)     Numerous moles     Fatigue, unspecified type     Anemia, unspecified type     Right knee pain, unspecified chronicity     Glaucoma suspect, bilateral     Past Surgical History:   Procedure Laterality Date     APPENDECTOMY       C NONSPECIFIC PROCEDURE  2006    nl     C NONSPECIFIC PROCEDURE  2005    nl     CHOLECYSTECTOMY, LAPOROSCOPIC      Cholecystectomy, Laparoscopic     HC COLONOSCOPY THRU STOMA, DIAGNOSTIC  2000    nl     TUBAL LIGATION         Social History     Tobacco Use     Smoking status: Never Smoker     Smokeless tobacco: Never Used   Substance Use Topics     Alcohol use: Yes     Comment: minimal - on holidays     Family History   Problem Relation Age of Onset     Heart Disease Father         CHS     Thyroid Disease Mother      Cancer Mother         skin     Cardiovascular Mother      C.A.D. Paternal Grandmother  "     Diabetes Maternal Grandfather      Hypertension Paternal Grandfather      Hypertension Brother          Current Outpatient Medications   Medication Sig Dispense Refill     B Complex-C (SUPER B COMPLEX PO) Take by mouth daily       calcium carbonate (TUMS CALCIUM FOR LIFE BONE) 750 MG CHEW Take 1 tablet (750 mg) by mouth 2 times daily (with meals) (Patient taking differently: Take 1,500 mg by mouth 2 times daily (with meals) ) 90 tablet      Cyanocobalamin (B-12 SUPER STRENGTH) 5000 MCG/ML LIQD Place 0.5 mLs under the tongue daily FOR VITAMIN B12 SUPPLEMENTATION, PLEASE PLACE UNDER THE TONGUE 2 Bottle PRN     estradiol (VAGIFEM) 10 MCG TABS vaginal tablet Place 10 mcg vaginally twice a week       FIBER PO        latanoprost (XALATAN) 0.005 % ophthalmic solution Place 1 drop into both eyes daily        vitamin D3 (CHOLECALCIFEROL) 40084 units capsule Take 1 capsule (50,000 Units) by mouth every 14 days TAKING ONE TAB TWICE A MONTH 24 capsule 1         Reviewed and updated as needed this visit by Provider         Review of Systems   ROS COMP: Constitutional, HEENT, cardiovascular, pulmonary, gi and gu systems are negative, except as otherwise noted.      Objective    Ht 1.695 m (5' 6.75\")   BMI 34.72 kg/m    Body mass index is 34.72 kg/m .  Physical Exam   GENERAL: obese, alert and no distress  EYES: Eyes grossly normal to inspection, PERRL and conjunctivae and sclerae normal  HENT: ear canals and TM's normal, nose and mouth without ulcers or lesions  NECK: no adenopathy, no asymmetry, masses, or scars and thyroid normal to palpation  RESP: lungs clear to auscultation - no rales, rhonchi or wheezes  CV: regular rate and rhythm, normal S1 S2, no S3 or S4, no murmur, click or rub, no peripheral edema and peripheral pulses strong  ABDOMEN: soft, nontender, no hepatosplenomegaly, no masses and bowel sounds normal  MS: no gross musculoskeletal defects noted, no edema, right knee with OA changes   SKIN: no suspicious " lesions or rashes  NEURO: Normal strength and tone, mentation intact and speech normal    Diagnostic Test Results:  Labs reviewed in Epic  Results for orders placed or performed in visit on 01/06/20   CBC with platelets     Status: None   Result Value Ref Range    WBC 4.7 4.0 - 11.0 10e9/L    RBC Count 4.36 3.8 - 5.2 10e12/L    Hemoglobin 13.1 11.7 - 15.7 g/dL    Hematocrit 40.7 35.0 - 47.0 %    MCV 93 78 - 100 fl    MCH 30.0 26.5 - 33.0 pg    MCHC 32.2 31.5 - 36.5 g/dL    RDW 13.0 10.0 - 15.0 %    Platelet Count 253 150 - 450 10e9/L   Erythrocyte sedimentation rate auto     Status: None   Result Value Ref Range    Sed Rate 25 0 - 30 mm/h   Comprehensive metabolic panel     Status: None   Result Value Ref Range    Sodium 139 133 - 144 mmol/L    Potassium 3.7 3.4 - 5.3 mmol/L    Chloride 106 94 - 109 mmol/L    Carbon Dioxide 25 20 - 32 mmol/L    Anion Gap 8 3 - 14 mmol/L    Glucose 92 70 - 99 mg/dL    Urea Nitrogen 15 7 - 30 mg/dL    Creatinine 0.74 0.52 - 1.04 mg/dL    GFR Estimate 80 >60 mL/min/[1.73_m2]    GFR Estimate If Black >90 >60 mL/min/[1.73_m2]    Calcium 9.6 8.5 - 10.1 mg/dL    Bilirubin Total 0.6 0.2 - 1.3 mg/dL    Albumin 3.8 3.4 - 5.0 g/dL    Protein Total 7.5 6.8 - 8.8 g/dL    Alkaline Phosphatase 76 40 - 150 U/L    ALT 20 0 - 50 U/L    AST 15 0 - 45 U/L   TSH with free T4 reflex     Status: None   Result Value Ref Range    TSH 1.35 0.40 - 4.00 mU/L   Vitamin D Deficiency     Status: None   Result Value Ref Range    Vitamin D Deficiency screening 37 20 - 75 ug/L   CK total     Status: None   Result Value Ref Range    CK Total 176 30 - 225 U/L           Assessment & Plan   Problem List Items Addressed This Visit     Vitamin D deficiency    Relevant Orders    Vitamin D Deficiency (Completed)    Myalgia     Relevant Orders    CBC with platelets (Completed)    Erythrocyte sedimentation rate auto (Completed)    Comprehensive metabolic panel (Completed)    TSH with free T4 reflex (Completed)    Vitamin D  "Deficiency (Completed)    CK total (Completed)      Other Visit Diagnoses     Elevated BP without diagnosis of hypertension    -  Primary    Relevant Orders    CBC with platelets (Completed)    Erythrocyte sedimentation rate auto (Completed)    Comprehensive metabolic panel (Completed)    TSH with free T4 reflex (Completed)    Vitamin D Deficiency (Completed)    CK total (Completed)    Midline low back pain without sciatica, unspecified chronicity        Relevant Orders    XR Lumbar Spine 2/3 Views (Completed)           Assess LS spine X rays   Assess lab work for myopathy, inflammation   Monitor BP, consider treatment     BMI:   Estimated body mass index is 35.5 kg/m  as calculated from the following:    Height as of this encounter: 1.695 m (5' 6.75\").    Weight as of this encounter: 102.1 kg (225 lb).   Weight management plan: Discussed healthy diet and exercise guidelines        See Patient Instructions  Return in about 3 months (around 4/6/2020) for follow up on acute problem if persists.    Corey Ambrose MD  [facility]        "

## 2020-01-07 LAB
ALBUMIN SERPL-MCNC: 3.8 G/DL (ref 3.4–5)
ALP SERPL-CCNC: 76 U/L (ref 40–150)
ALT SERPL W P-5'-P-CCNC: 20 U/L (ref 0–50)
ANION GAP SERPL CALCULATED.3IONS-SCNC: 8 MMOL/L (ref 3–14)
AST SERPL W P-5'-P-CCNC: 15 U/L (ref 0–45)
BILIRUB SERPL-MCNC: 0.6 MG/DL (ref 0.2–1.3)
BUN SERPL-MCNC: 15 MG/DL (ref 7–30)
CALCIUM SERPL-MCNC: 9.6 MG/DL (ref 8.5–10.1)
CHLORIDE SERPL-SCNC: 106 MMOL/L (ref 94–109)
CK SERPL-CCNC: 176 U/L (ref 30–225)
CO2 SERPL-SCNC: 25 MMOL/L (ref 20–32)
CREAT SERPL-MCNC: 0.74 MG/DL (ref 0.52–1.04)
DEPRECATED CALCIDIOL+CALCIFEROL SERPL-MC: 37 UG/L (ref 20–75)
GFR SERPL CREATININE-BSD FRML MDRD: 80 ML/MIN/{1.73_M2}
GLUCOSE SERPL-MCNC: 92 MG/DL (ref 70–99)
POTASSIUM SERPL-SCNC: 3.7 MMOL/L (ref 3.4–5.3)
PROT SERPL-MCNC: 7.5 G/DL (ref 6.8–8.8)
SODIUM SERPL-SCNC: 139 MMOL/L (ref 133–144)
TSH SERPL DL<=0.005 MIU/L-ACNC: 1.35 MU/L (ref 0.4–4)

## 2020-02-13 ENCOUNTER — TRANSFERRED RECORDS (OUTPATIENT)
Dept: HEALTH INFORMATION MANAGEMENT | Facility: CLINIC | Age: 75
End: 2020-02-13

## 2020-03-02 ENCOUNTER — OFFICE VISIT (OUTPATIENT)
Dept: INTERNAL MEDICINE | Facility: CLINIC | Age: 75
End: 2020-03-02
Payer: COMMERCIAL

## 2020-03-02 VITALS
WEIGHT: 218.8 LBS | HEIGHT: 67 IN | RESPIRATION RATE: 16 BRPM | SYSTOLIC BLOOD PRESSURE: 138 MMHG | OXYGEN SATURATION: 98 % | HEART RATE: 82 BPM | DIASTOLIC BLOOD PRESSURE: 76 MMHG | BODY MASS INDEX: 34.34 KG/M2 | TEMPERATURE: 98.2 F

## 2020-03-02 DIAGNOSIS — M16.12 PRIMARY OSTEOARTHRITIS OF LEFT HIP: ICD-10-CM

## 2020-03-02 DIAGNOSIS — Z01.818 PREOP GENERAL PHYSICAL EXAM: Primary | ICD-10-CM

## 2020-03-02 DIAGNOSIS — Z23 NEED FOR VACCINATION: ICD-10-CM

## 2020-03-02 PROCEDURE — 90715 TDAP VACCINE 7 YRS/> IM: CPT | Performed by: INTERNAL MEDICINE

## 2020-03-02 PROCEDURE — 99214 OFFICE O/P EST MOD 30 MIN: CPT | Mod: 25 | Performed by: INTERNAL MEDICINE

## 2020-03-02 PROCEDURE — 90471 IMMUNIZATION ADMIN: CPT | Performed by: INTERNAL MEDICINE

## 2020-03-02 PROCEDURE — 93000 ELECTROCARDIOGRAM COMPLETE: CPT | Performed by: INTERNAL MEDICINE

## 2020-03-02 ASSESSMENT — MIFFLIN-ST. JEOR: SCORE: 1521.13

## 2020-03-02 NOTE — PROGRESS NOTES
[place] [address]  [facility] [address]  [phone]  Dept: [phone]    PRE-OP EVALUATION:  Today's date: 3/2/2020    Justine Michelle (: 1945) presents for pre-operative evaluation assessment as requested by Dr. Bloom.  She requires evaluation and anesthesia risk assessment prior to undergoing surgery/procedure for treatment of Left hip replacement .    Fax number for surgical facility: [facility]  Primary Physician: Corey Ambrose  Type of Anesthesia Anticipated: General    Patient has a Health Care Directive or Living Will:  YES     Preop Questions 3/2/2020   Who is doing your surgery? dr sunil bloom   What are you having done? left hip replacement   Date of Surgery/Procedure:    Facility or Hospital where procedure/surgery will be performed: [facility]   1.  Do you have a history of Heart attack, stroke, stent, coronary bypass surgery, or other heart surgery? No   2.  Do you ever have any pain or discomfort in your chest? No   3.  Do you have a history of  Heart Failure? No   4.   Are you troubled by shortness of breath when:  walking on a level surface, or up a slight hill, or at night? No   5.  Do you currently have a cold, bronchitis or other respiratory infection? No   6.  Do you have a cough, shortness of breath, or wheezing? No   7.  Do you sometimes get pains in the calves of your legs when you walk? No   8. Do you or anyone in your family have previous history of blood clots? No   9.  Do you or does anyone in your family have a serious bleeding problem such as prolonged bleeding following surgeries or cuts? No   10. Have you ever had problems with anemia or been told to take iron pills? No   11. Have you had any abnormal blood loss such as black, tarry or bloody stools, or abnormal vaginal bleeding? No   12. Have you ever had a blood transfusion? No   13. Have you or any of your relatives ever had problems with  anesthesia? No   14. Do you have sleep apnea, excessive snoring or daytime drowsiness? No   15. Do you have any prosthetic heart valves? No   16. Do you have prosthetic joints? No   17. Is there any chance that you may be pregnant? No         HPI:     HPI related to upcoming procedure: scheduled for CARLEY of the left hip for symptomatic OA.   No acute complaints, no medication change or new medical conditions.        See problem list for active medical problems.  Problems all longstanding and stable, except as noted/documented.  See ROS for pertinent symptoms related to these conditions.      MEDICAL HISTORY:     Patient Active Problem List    Diagnosis Date Noted     Glaucoma suspect, bilateral 11/28/2018     Priority: Medium     Right knee pain, unspecified chronicity 05/08/2017     Priority: Medium     Fatigue, unspecified type 11/07/2016     Priority: Medium     Anemia, unspecified type 11/07/2016     Priority: Medium     RECENT EPISODE OF DIVERTICULITIS       Numerous moles 08/04/2016     Priority: Medium     Myalgia  01/07/2016     Priority: Medium     Restless leg syndrome 01/07/2016     Priority: Medium     Overweight (BMI 25.0-29.9) 01/07/2016     Priority: Medium     Adult BMI 28.0-28.9 kg/sq m 10/01/2015     Priority: Medium     Advanced directives, counseling/discussion 01/20/2015     Priority: Medium     Advance Care Planning:   ACP Review and Resources Provided:  Reviewed chart for advance care plan.  Justine Michelle has no plan or code status on file. Discussed available resources and provided with information. Confirmed code status reflects current choices pending further ACP discussions.  Confirmed/documented designated decision maker(s). See permanent comments section of demographics in clinical tab.   Added by Vicky Estrada on 1/20/2015             CARDIOVASCULAR SCREENING; LDL GOAL LESS THAN 160 01/20/2015     Priority: Medium     Fatigue 01/20/2015     Priority: Medium     Ascorbic acid  deficiency 10/28/2014     Priority: Medium     Diagnosis updated by automated process. Provider to review and confirm.       Vitamin B12 deficiency without anemia 10/28/2014     Priority: Medium     Diagnosis updated by automated process. Provider to review and confirm.       Senile cataract 10/28/2014     Priority: Medium     Elevated ferritin level 10/28/2014     Priority: Medium     Vitamin D deficiency 10/28/2014     Priority: Medium     Palpitations 06/03/2014     Priority: Medium     Acute abdominal pain 04/16/2013     Priority: Medium     Diverticulitis of colon 04/16/2013     Priority: Medium     Myalgia and myositis 09/22/2006     Priority: Medium     Problem list name updated by automated process. Provider to review       Diverticulosis of large intestine      Priority: Medium     colonoscopy due in 2013  Problem list name updated by automated process. Provider to review       Absence of menstruation      Priority: Medium     not on HRT        Past Medical History:   Diagnosis Date     Absence of menstruation 2006    not on HRT     Acute abdominal pain 4/16/2013     Diverticulitis of colon 4/16/2013    also 2003, 2007     Diverticulosis of colon (without mention of hemorrhage)     colonoscopy due in 2013     Myalgia and myositis, unspecified      Obesity, unspecified      Other and unspecified hyperlipidemia      Palpitations      Past Surgical History:   Procedure Laterality Date     APPENDECTOMY       C NONSPECIFIC PROCEDURE  2006    nl     C NONSPECIFIC PROCEDURE  2005    nl     CHOLECYSTECTOMY, LAPOROSCOPIC      Cholecystectomy, Laparoscopic     HC COLONOSCOPY THRU STOMA, DIAGNOSTIC  2000    nl     ORTHOPEDIC SURGERY Left 1991    buionette     TUBAL LIGATION       Current Outpatient Medications   Medication Sig Dispense Refill     B Complex-C (SUPER B COMPLEX PO) Take by mouth daily       calcium carbonate (TUMS CALCIUM FOR LIFE BONE) 750 MG CHEW Take 1 tablet (750 mg) by mouth 2 times daily (with  "meals) (Patient taking differently: Take 1,500 mg by mouth 2 times daily (with meals) ) 90 tablet      Cyanocobalamin (B-12 SUPER STRENGTH) 5000 MCG/ML LIQD Place 0.5 mLs under the tongue daily FOR VITAMIN B12 SUPPLEMENTATION, PLEASE PLACE UNDER THE TONGUE 2 Bottle PRN     FIBER PO Take by mouth as needed        latanoprost (XALATAN) 0.005 % ophthalmic solution Place 1 drop into both eyes daily        vitamin D3 (CHOLECALCIFEROL) 64110 units capsule Take 1 capsule (50,000 Units) by mouth every 14 days TAKING ONE TAB TWICE A MONTH 24 capsule 1     OTC products: None, except as noted above    Allergies   Allergen Reactions     Augmentin Rash     Bees      hives      Latex Allergy: NO    Social History     Tobacco Use     Smoking status: Never Smoker     Smokeless tobacco: Never Used   Substance Use Topics     Alcohol use: Yes     Comment: minimal - on holidays     History   Drug Use No       REVIEW OF SYSTEMS:   CONSTITUTIONAL: NEGATIVE for fever, chills, change in weight  INTEGUMENTARY/SKIN: NEGATIVE for worrisome rashes, moles or lesions  EYES: NEGATIVE for vision changes or irritation  ENT/MOUTH: NEGATIVE for ear, mouth and throat problems  RESP: NEGATIVE for significant cough or SOB  BREAST: NEGATIVE for masses, tenderness or discharge  CV: NEGATIVE for chest pain, palpitations or peripheral edema  GI: NEGATIVE for nausea, abdominal pain, heartburn, or change in bowel habits  : NEGATIVE for frequency, dysuria, or hematuria  MUSCULOSKELETAL: NEGATIVE for significant arthralgias or myalgia  NEURO: NEGATIVE for weakness, dizziness or paresthesias  ENDOCRINE: NEGATIVE for temperature intolerance, skin/hair changes  HEME: NEGATIVE for bleeding problems  PSYCHIATRIC: NEGATIVE for changes in mood or affect    EXAM:   /76 (BP Location: Left arm, Patient Position: Sitting, Cuff Size: Adult Regular)   Pulse 82   Temp 98.2  F (36.8  C) (Oral)   Resp 16   Ht 1.695 m (5' 6.75\")   Wt 99.2 kg (218 lb 12.8 oz)   " SpO2 98%   BMI 34.53 kg/m      GENERAL APPEARANCE:overweight, alert and no distress     EYES: EOMI, PERRL     HENT: ear canals and TM's normal and nose and mouth without ulcers or lesions     NECK: no adenopathy, no asymmetry, masses, or scars and thyroid normal to palpation     RESP: lungs clear to auscultation - no rales, rhonchi or wheezes     CV: regular rates and rhythm, normal S1 S2, no S3 or S4 and no murmur, click or rub     ABDOMEN:  soft, nontender, no HSM or masses and bowel sounds normal     MS: extremities normal- no gross deformities noted, no evidence of inflammation in joints, FROM in all extremities.     SKIN: no suspicious lesions or rashes     NEURO: Normal strength and tone, sensory exam grossly normal, mentation intact and speech normal     PSYCH: mentation appears normal. and affect normal/bright     LYMPHATICS: No cervical adenopathy    DIAGNOSTICS:   EKG: appears normal, NSR, normal axis, normal intervals, no acute ST/T changes c/w ischemia, no LVH by voltage criteria, unchanged from previous tracings  Labs Resulted Today: No results found for any visits on 03/02/20.    Recent Labs   Lab Test 01/06/20  1536 11/28/18  0911  05/02/14  0859   HGB 13.1 12.4   < >  --     199   < >  --     140   < > 143   POTASSIUM 3.7 3.9   < > 3.8   CR 0.74 0.81   < > 0.86   A1C  --   --   --  5.7    < > = values in this interval not displayed.        IMPRESSION:   Reason for surgery/procedure: left hip OA -  CARLEY   Diagnosis/reason for consult: preoperative evaluation/ clearance      The proposed surgical procedure is considered INTERMEDIATE risk.    REVISED CARDIAC RISK INDEX  The patient has the following serious cardiovascular risks for perioperative complications such as (MI, PE, VFib and 3  AV Block):  No serious cardiac risks  INTERPRETATION: 0 risks: Class I (very low risk - 0.4% complication rate)    The patient has the following additional risks for perioperative complications:  No  identified additional risks      ICD-10-CM    1. Preop general physical exam Z01.818 EKG 12-lead complete w/read - Clinics       RECOMMENDATIONS:         --Patient is to take all scheduled medications on the day of surgery EXCEPT for modifications listed below.  Hold Aspirin for one week prior to surgery.     APPROVAL GIVEN to proceed with proposed procedure, without further diagnostic evaluation       Signed Electronically by: Corey Ambrose MD    Copy of this evaluation report is provided to requesting physician.    [facility] Preop Guidelines    Revised Cardiac Risk Index

## 2020-03-15 ENCOUNTER — HEALTH MAINTENANCE LETTER (OUTPATIENT)
Age: 75
End: 2020-03-15

## 2020-03-17 ENCOUNTER — HOSPITAL ENCOUNTER (INPATIENT)
Facility: CLINIC | Age: 75
LOS: 3 days | Discharge: HOME | DRG: 470 | End: 2020-03-20
Attending: ORTHOPAEDIC SURGERY | Admitting: ORTHOPAEDIC SURGERY
Payer: COMMERCIAL

## 2020-03-17 ENCOUNTER — APPOINTMENT (OUTPATIENT)
Dept: GENERAL RADIOLOGY | Facility: CLINIC | Age: 75
DRG: 470 | End: 2020-03-17
Attending: ORTHOPAEDIC SURGERY
Payer: COMMERCIAL

## 2020-03-17 ENCOUNTER — ANESTHESIA (OUTPATIENT)
Dept: SURGERY | Facility: CLINIC | Age: 75
DRG: 470 | End: 2020-03-17
Payer: COMMERCIAL

## 2020-03-17 ENCOUNTER — ANESTHESIA EVENT (OUTPATIENT)
Dept: SURGERY | Facility: CLINIC | Age: 75
DRG: 470 | End: 2020-03-17
Payer: COMMERCIAL

## 2020-03-17 DIAGNOSIS — Z96.642 STATUS POST TOTAL REPLACEMENT OF LEFT HIP: Primary | ICD-10-CM

## 2020-03-17 PROBLEM — Z96.649 S/P TOTAL HIP ARTHROPLASTY: Status: ACTIVE | Noted: 2020-03-17

## 2020-03-17 LAB
CREAT SERPL-MCNC: 0.68 MG/DL (ref 0.52–1.04)
GFR SERPL CREATININE-BSD FRML MDRD: 86 ML/MIN/{1.73_M2}
PLATELET # BLD AUTO: 209 10E9/L (ref 150–450)

## 2020-03-17 PROCEDURE — 37000008 ZZH ANESTHESIA TECHNICAL FEE, 1ST 30 MIN: Performed by: ORTHOPAEDIC SURGERY

## 2020-03-17 PROCEDURE — 40000986 XR PELVIS AND HIP LEFT 1 VIEW

## 2020-03-17 PROCEDURE — 27210794 ZZH OR GENERAL SUPPLY STERILE: Performed by: ORTHOPAEDIC SURGERY

## 2020-03-17 PROCEDURE — 25000128 H RX IP 250 OP 636: Performed by: ANESTHESIOLOGY

## 2020-03-17 PROCEDURE — 36000063 ZZH SURGERY LEVEL 4 EA 15 ADDTL MIN: Performed by: ORTHOPAEDIC SURGERY

## 2020-03-17 PROCEDURE — 12000000 ZZH R&B MED SURG/OB

## 2020-03-17 PROCEDURE — 0SRB04A REPLACEMENT OF LEFT HIP JOINT WITH CERAMIC ON POLYETHYLENE SYNTHETIC SUBSTITUTE, UNCEMENTED, OPEN APPROACH: ICD-10-PCS | Performed by: ORTHOPAEDIC SURGERY

## 2020-03-17 PROCEDURE — 82565 ASSAY OF CREATININE: CPT | Performed by: ORTHOPAEDIC SURGERY

## 2020-03-17 PROCEDURE — 25000128 H RX IP 250 OP 636: Performed by: PHYSICIAN ASSISTANT

## 2020-03-17 PROCEDURE — 25000132 ZZH RX MED GY IP 250 OP 250 PS 637: Performed by: PHYSICIAN ASSISTANT

## 2020-03-17 PROCEDURE — 40000985 XR HIP PORT LT 1 VW: Mod: TC

## 2020-03-17 PROCEDURE — 27211024 ZZHC OR SUPPLY OTHER OPNP: Performed by: ORTHOPAEDIC SURGERY

## 2020-03-17 PROCEDURE — 37000009 ZZH ANESTHESIA TECHNICAL FEE, EACH ADDTL 15 MIN: Performed by: ORTHOPAEDIC SURGERY

## 2020-03-17 PROCEDURE — 71000012 ZZH RECOVERY PHASE 1 LEVEL 1 FIRST HR: Performed by: ORTHOPAEDIC SURGERY

## 2020-03-17 PROCEDURE — 25000132 ZZH RX MED GY IP 250 OP 250 PS 637: Performed by: ORTHOPAEDIC SURGERY

## 2020-03-17 PROCEDURE — 25000125 ZZHC RX 250: Performed by: NURSE ANESTHETIST, CERTIFIED REGISTERED

## 2020-03-17 PROCEDURE — 36415 COLL VENOUS BLD VENIPUNCTURE: CPT | Performed by: ORTHOPAEDIC SURGERY

## 2020-03-17 PROCEDURE — 25800030 ZZH RX IP 258 OP 636: Performed by: ANESTHESIOLOGY

## 2020-03-17 PROCEDURE — C1776 JOINT DEVICE (IMPLANTABLE): HCPCS | Performed by: ORTHOPAEDIC SURGERY

## 2020-03-17 PROCEDURE — 36000093 ZZH SURGERY LEVEL 4 1ST 30 MIN: Performed by: ORTHOPAEDIC SURGERY

## 2020-03-17 PROCEDURE — 40000306 ZZH STATISTIC PRE PROC ASSESS II: Performed by: ORTHOPAEDIC SURGERY

## 2020-03-17 PROCEDURE — 25800030 ZZH RX IP 258 OP 636: Performed by: ORTHOPAEDIC SURGERY

## 2020-03-17 PROCEDURE — 25000128 H RX IP 250 OP 636: Performed by: NURSE ANESTHETIST, CERTIFIED REGISTERED

## 2020-03-17 PROCEDURE — 71000013 ZZH RECOVERY PHASE 1 LEVEL 1 EA ADDTL HR: Performed by: ORTHOPAEDIC SURGERY

## 2020-03-17 PROCEDURE — 85049 AUTOMATED PLATELET COUNT: CPT | Performed by: ORTHOPAEDIC SURGERY

## 2020-03-17 PROCEDURE — C1713 ANCHOR/SCREW BN/BN,TIS/BN: HCPCS | Performed by: ORTHOPAEDIC SURGERY

## 2020-03-17 PROCEDURE — 25000128 H RX IP 250 OP 636: Performed by: ORTHOPAEDIC SURGERY

## 2020-03-17 PROCEDURE — 25000125 ZZHC RX 250: Performed by: PHYSICIAN ASSISTANT

## 2020-03-17 DEVICE — IMP SHELL BIOM G7 ACETAB PPS LIM HOLE 50MM SZ D 010000662: Type: IMPLANTABLE DEVICE | Site: HIP | Status: FUNCTIONAL

## 2020-03-17 DEVICE — IMPLANTABLE DEVICE: Type: IMPLANTABLE DEVICE | Site: HIP | Status: FUNCTIONAL

## 2020-03-17 DEVICE — IMP SCR ZIM 6.5X20MM ACET CUP SELF TAP 00-6250-065-20: Type: IMPLANTABLE DEVICE | Site: HIP | Status: FUNCTIONAL

## 2020-03-17 DEVICE — IMP LINER BIOM G7 ACET NEU ARCOMXL CRSLNK 36MM SZ D 10000739: Type: IMPLANTABLE DEVICE | Site: HIP | Status: FUNCTIONAL

## 2020-03-17 RX ORDER — FENTANYL CITRATE 50 UG/ML
25-50 INJECTION, SOLUTION INTRAMUSCULAR; INTRAVENOUS
Status: DISCONTINUED | OUTPATIENT
Start: 2020-03-17 | End: 2020-03-17 | Stop reason: HOSPADM

## 2020-03-17 RX ORDER — DEXAMETHASONE SODIUM PHOSPHATE 4 MG/ML
INJECTION, SOLUTION INTRA-ARTICULAR; INTRALESIONAL; INTRAMUSCULAR; INTRAVENOUS; SOFT TISSUE PRN
Status: DISCONTINUED | OUTPATIENT
Start: 2020-03-17 | End: 2020-03-17

## 2020-03-17 RX ORDER — BISACODYL 10 MG
10 SUPPOSITORY, RECTAL RECTAL DAILY PRN
Status: DISCONTINUED | OUTPATIENT
Start: 2020-03-17 | End: 2020-03-20 | Stop reason: HOSPADM

## 2020-03-17 RX ORDER — CEFAZOLIN SODIUM 1 G/3ML
1 INJECTION, POWDER, FOR SOLUTION INTRAMUSCULAR; INTRAVENOUS SEE ADMIN INSTRUCTIONS
Status: DISCONTINUED | OUTPATIENT
Start: 2020-03-17 | End: 2020-03-17 | Stop reason: CLARIF

## 2020-03-17 RX ORDER — HYDROXYZINE HYDROCHLORIDE 25 MG/1
25 TABLET, FILM COATED ORAL EVERY 6 HOURS PRN
Status: DISCONTINUED | OUTPATIENT
Start: 2020-03-17 | End: 2020-03-20 | Stop reason: HOSPADM

## 2020-03-17 RX ORDER — LIDOCAINE 40 MG/G
CREAM TOPICAL
Status: DISCONTINUED | OUTPATIENT
Start: 2020-03-17 | End: 2020-03-20 | Stop reason: HOSPADM

## 2020-03-17 RX ORDER — ONDANSETRON 2 MG/ML
4 INJECTION INTRAMUSCULAR; INTRAVENOUS EVERY 6 HOURS PRN
Status: DISCONTINUED | OUTPATIENT
Start: 2020-03-17 | End: 2020-03-20 | Stop reason: HOSPADM

## 2020-03-17 RX ORDER — CELECOXIB 200 MG/1
400 CAPSULE ORAL ONCE
Status: COMPLETED | OUTPATIENT
Start: 2020-03-17 | End: 2020-03-17

## 2020-03-17 RX ORDER — CALCIUM CARBONATE 750 MG/1
1500 TABLET, CHEWABLE ORAL DAILY
COMMUNITY

## 2020-03-17 RX ORDER — GLYCOPYRROLATE 0.2 MG/ML
INJECTION, SOLUTION INTRAMUSCULAR; INTRAVENOUS PRN
Status: DISCONTINUED | OUTPATIENT
Start: 2020-03-17 | End: 2020-03-17

## 2020-03-17 RX ORDER — NALOXONE HYDROCHLORIDE 0.4 MG/ML
.1-.4 INJECTION, SOLUTION INTRAMUSCULAR; INTRAVENOUS; SUBCUTANEOUS
Status: DISCONTINUED | OUTPATIENT
Start: 2020-03-17 | End: 2020-03-17 | Stop reason: HOSPADM

## 2020-03-17 RX ORDER — ONDANSETRON 2 MG/ML
INJECTION INTRAMUSCULAR; INTRAVENOUS PRN
Status: DISCONTINUED | OUTPATIENT
Start: 2020-03-17 | End: 2020-03-17

## 2020-03-17 RX ORDER — HYDROMORPHONE HYDROCHLORIDE 1 MG/ML
0.2 INJECTION, SOLUTION INTRAMUSCULAR; INTRAVENOUS; SUBCUTANEOUS
Status: DISCONTINUED | OUTPATIENT
Start: 2020-03-17 | End: 2020-03-20 | Stop reason: HOSPADM

## 2020-03-17 RX ORDER — ALBUTEROL SULFATE 0.83 MG/ML
2.5 SOLUTION RESPIRATORY (INHALATION) EVERY 4 HOURS PRN
Status: DISCONTINUED | OUTPATIENT
Start: 2020-03-17 | End: 2020-03-17 | Stop reason: HOSPADM

## 2020-03-17 RX ORDER — SODIUM CHLORIDE, SODIUM LACTATE, POTASSIUM CHLORIDE, CALCIUM CHLORIDE 600; 310; 30; 20 MG/100ML; MG/100ML; MG/100ML; MG/100ML
INJECTION, SOLUTION INTRAVENOUS CONTINUOUS
Status: DISCONTINUED | OUTPATIENT
Start: 2020-03-17 | End: 2020-03-17 | Stop reason: CLARIF

## 2020-03-17 RX ORDER — MEPERIDINE HYDROCHLORIDE 50 MG/ML
12.5 INJECTION INTRAMUSCULAR; INTRAVENOUS; SUBCUTANEOUS
Status: DISCONTINUED | OUTPATIENT
Start: 2020-03-17 | End: 2020-03-17 | Stop reason: HOSPADM

## 2020-03-17 RX ORDER — CEFAZOLIN SODIUM 2 G/100ML
2 INJECTION, SOLUTION INTRAVENOUS
Status: COMPLETED | OUTPATIENT
Start: 2020-03-17 | End: 2020-03-17

## 2020-03-17 RX ORDER — GLYCINE 1.5 G/100ML
SOLUTION IRRIGATION PRN
Status: DISCONTINUED | OUTPATIENT
Start: 2020-03-17 | End: 2020-03-17 | Stop reason: HOSPADM

## 2020-03-17 RX ORDER — METOPROLOL TARTRATE 1 MG/ML
INJECTION, SOLUTION INTRAVENOUS PRN
Status: DISCONTINUED | OUTPATIENT
Start: 2020-03-17 | End: 2020-03-17

## 2020-03-17 RX ORDER — DIMENHYDRINATE 50 MG/ML
25 INJECTION, SOLUTION INTRAMUSCULAR; INTRAVENOUS
Status: DISCONTINUED | OUTPATIENT
Start: 2020-03-17 | End: 2020-03-17 | Stop reason: HOSPADM

## 2020-03-17 RX ORDER — HYDROMORPHONE HYDROCHLORIDE 1 MG/ML
.3-.5 INJECTION, SOLUTION INTRAMUSCULAR; INTRAVENOUS; SUBCUTANEOUS EVERY 10 MIN PRN
Status: DISCONTINUED | OUTPATIENT
Start: 2020-03-17 | End: 2020-03-17 | Stop reason: HOSPADM

## 2020-03-17 RX ORDER — PROPOFOL 10 MG/ML
INJECTION, EMULSION INTRAVENOUS PRN
Status: DISCONTINUED | OUTPATIENT
Start: 2020-03-17 | End: 2020-03-17

## 2020-03-17 RX ORDER — LATANOPROST 50 UG/ML
1 SOLUTION/ DROPS OPHTHALMIC EVERY EVENING
Status: DISCONTINUED | OUTPATIENT
Start: 2020-03-17 | End: 2020-03-19

## 2020-03-17 RX ORDER — OXYCODONE HYDROCHLORIDE 5 MG/1
5-10 TABLET ORAL
Status: DISCONTINUED | OUTPATIENT
Start: 2020-03-17 | End: 2020-03-20 | Stop reason: HOSPADM

## 2020-03-17 RX ORDER — NALOXONE HYDROCHLORIDE 0.4 MG/ML
.1-.4 INJECTION, SOLUTION INTRAMUSCULAR; INTRAVENOUS; SUBCUTANEOUS
Status: DISCONTINUED | OUTPATIENT
Start: 2020-03-17 | End: 2020-03-20 | Stop reason: HOSPADM

## 2020-03-17 RX ORDER — ACETAMINOPHEN 325 MG/1
650 TABLET ORAL EVERY 4 HOURS PRN
Status: DISCONTINUED | OUTPATIENT
Start: 2020-03-20 | End: 2020-03-20 | Stop reason: HOSPADM

## 2020-03-17 RX ORDER — LIDOCAINE HYDROCHLORIDE 10 MG/ML
INJECTION, SOLUTION INFILTRATION; PERINEURAL PRN
Status: DISCONTINUED | OUTPATIENT
Start: 2020-03-17 | End: 2020-03-17

## 2020-03-17 RX ORDER — METOPROLOL TARTRATE 1 MG/ML
1-2 INJECTION, SOLUTION INTRAVENOUS EVERY 5 MIN PRN
Status: DISCONTINUED | OUTPATIENT
Start: 2020-03-17 | End: 2020-03-17 | Stop reason: HOSPADM

## 2020-03-17 RX ORDER — SODIUM CHLORIDE 9 MG/ML
INJECTION, SOLUTION INTRAVENOUS CONTINUOUS
Status: DISCONTINUED | OUTPATIENT
Start: 2020-03-17 | End: 2020-03-18

## 2020-03-17 RX ORDER — ONDANSETRON 2 MG/ML
4 INJECTION INTRAMUSCULAR; INTRAVENOUS EVERY 6 HOURS
Status: DISPENSED | OUTPATIENT
Start: 2020-03-17 | End: 2020-03-18

## 2020-03-17 RX ORDER — CELECOXIB 200 MG/1
200 CAPSULE ORAL 2 TIMES DAILY
Status: DISCONTINUED | OUTPATIENT
Start: 2020-03-17 | End: 2020-03-20 | Stop reason: HOSPADM

## 2020-03-17 RX ORDER — ONDANSETRON 4 MG/1
4 TABLET, ORALLY DISINTEGRATING ORAL EVERY 6 HOURS PRN
Status: DISCONTINUED | OUTPATIENT
Start: 2020-03-17 | End: 2020-03-20 | Stop reason: HOSPADM

## 2020-03-17 RX ORDER — CEFAZOLIN SODIUM 2 G/100ML
2 INJECTION, SOLUTION INTRAVENOUS EVERY 8 HOURS
Status: DISPENSED | OUTPATIENT
Start: 2020-03-17 | End: 2020-03-18

## 2020-03-17 RX ORDER — ONDANSETRON 2 MG/ML
4 INJECTION INTRAMUSCULAR; INTRAVENOUS EVERY 30 MIN PRN
Status: DISCONTINUED | OUTPATIENT
Start: 2020-03-17 | End: 2020-03-17 | Stop reason: HOSPADM

## 2020-03-17 RX ORDER — ONDANSETRON 4 MG/1
4 TABLET, ORALLY DISINTEGRATING ORAL EVERY 30 MIN PRN
Status: DISCONTINUED | OUTPATIENT
Start: 2020-03-17 | End: 2020-03-17 | Stop reason: HOSPADM

## 2020-03-17 RX ORDER — FENTANYL CITRATE 50 UG/ML
INJECTION, SOLUTION INTRAMUSCULAR; INTRAVENOUS PRN
Status: DISCONTINUED | OUTPATIENT
Start: 2020-03-17 | End: 2020-03-17

## 2020-03-17 RX ORDER — NEOSTIGMINE METHYLSULFATE 1 MG/ML
VIAL (ML) INJECTION PRN
Status: DISCONTINUED | OUTPATIENT
Start: 2020-03-17 | End: 2020-03-17

## 2020-03-17 RX ORDER — ACETAMINOPHEN 325 MG/1
975 TABLET ORAL EVERY 8 HOURS
Status: DISCONTINUED | OUTPATIENT
Start: 2020-03-17 | End: 2020-03-20 | Stop reason: HOSPADM

## 2020-03-17 RX ORDER — SODIUM CHLORIDE, SODIUM LACTATE, POTASSIUM CHLORIDE, CALCIUM CHLORIDE 600; 310; 30; 20 MG/100ML; MG/100ML; MG/100ML; MG/100ML
INJECTION, SOLUTION INTRAVENOUS CONTINUOUS
Status: DISCONTINUED | OUTPATIENT
Start: 2020-03-17 | End: 2020-03-17 | Stop reason: HOSPADM

## 2020-03-17 RX ORDER — KETAMINE HYDROCHLORIDE 10 MG/ML
INJECTION INTRAMUSCULAR; INTRAVENOUS PRN
Status: DISCONTINUED | OUTPATIENT
Start: 2020-03-17 | End: 2020-03-17

## 2020-03-17 RX ORDER — PANTOPRAZOLE SODIUM 40 MG/1
40 TABLET, DELAYED RELEASE ORAL ONCE
Status: COMPLETED | OUTPATIENT
Start: 2020-03-17 | End: 2020-03-17

## 2020-03-17 RX ORDER — POLYETHYLENE GLYCOL 3350 17 G/17G
17 POWDER, FOR SOLUTION ORAL DAILY
Status: DISCONTINUED | OUTPATIENT
Start: 2020-03-17 | End: 2020-03-20 | Stop reason: HOSPADM

## 2020-03-17 RX ORDER — LIDOCAINE 40 MG/G
CREAM TOPICAL
Status: DISCONTINUED | OUTPATIENT
Start: 2020-03-17 | End: 2020-03-17 | Stop reason: CLARIF

## 2020-03-17 RX ORDER — AMOXICILLIN 250 MG
2 CAPSULE ORAL 2 TIMES DAILY
Status: DISCONTINUED | OUTPATIENT
Start: 2020-03-17 | End: 2020-03-20 | Stop reason: HOSPADM

## 2020-03-17 RX ADMIN — CELECOXIB 400 MG: 200 CAPSULE ORAL at 11:36

## 2020-03-17 RX ADMIN — GLYCOPYRROLATE 0.4 MG: 0.2 INJECTION, SOLUTION INTRAMUSCULAR; INTRAVENOUS at 15:05

## 2020-03-17 RX ADMIN — ACETAMINOPHEN 975 MG: 325 TABLET, FILM COATED ORAL at 22:31

## 2020-03-17 RX ADMIN — LATANOPROST 1 DROP: 50 SOLUTION OPHTHALMIC at 20:15

## 2020-03-17 RX ADMIN — CELECOXIB 200 MG: 200 CAPSULE ORAL at 20:15

## 2020-03-17 RX ADMIN — HYDROMORPHONE HYDROCHLORIDE 1 MG: 1 INJECTION, SOLUTION INTRAMUSCULAR; INTRAVENOUS; SUBCUTANEOUS at 13:35

## 2020-03-17 RX ADMIN — DEXAMETHASONE SODIUM PHOSPHATE 4 MG: 4 INJECTION, SOLUTION INTRA-ARTICULAR; INTRALESIONAL; INTRAMUSCULAR; INTRAVENOUS; SOFT TISSUE at 13:03

## 2020-03-17 RX ADMIN — FENTANYL CITRATE 50 MCG: 50 INJECTION INTRAMUSCULAR; INTRAVENOUS at 16:53

## 2020-03-17 RX ADMIN — CEFAZOLIN SODIUM 2 G: 2 INJECTION, SOLUTION INTRAVENOUS at 12:59

## 2020-03-17 RX ADMIN — SODIUM CHLORIDE: 9 INJECTION, SOLUTION INTRAVENOUS at 20:32

## 2020-03-17 RX ADMIN — MIDAZOLAM 1 MG: 1 INJECTION INTRAMUSCULAR; INTRAVENOUS at 12:59

## 2020-03-17 RX ADMIN — METOPROLOL TARTRATE 2 MG: 5 INJECTION INTRAVENOUS at 13:53

## 2020-03-17 RX ADMIN — SODIUM CHLORIDE 1000 ML: 9 INJECTION, SOLUTION INTRAVENOUS at 18:40

## 2020-03-17 RX ADMIN — HYDROXYZINE HYDROCHLORIDE 25 MG: 25 TABLET, FILM COATED ORAL at 22:37

## 2020-03-17 RX ADMIN — FENTANYL CITRATE 150 MCG: 50 INJECTION, SOLUTION INTRAMUSCULAR; INTRAVENOUS at 13:03

## 2020-03-17 RX ADMIN — FENTANYL CITRATE 50 MCG: 50 INJECTION, SOLUTION INTRAMUSCULAR; INTRAVENOUS at 13:46

## 2020-03-17 RX ADMIN — SUGAMMADEX 200 MG: 100 INJECTION, SOLUTION INTRAVENOUS at 15:22

## 2020-03-17 RX ADMIN — LIDOCAINE HYDROCHLORIDE 30 MG: 10 INJECTION, SOLUTION INFILTRATION; PERINEURAL at 13:03

## 2020-03-17 RX ADMIN — GLYCOPYRROLATE 0.2 MG: 0.2 INJECTION, SOLUTION INTRAMUSCULAR; INTRAVENOUS at 13:03

## 2020-03-17 RX ADMIN — ROCURONIUM BROMIDE 50 MG: 10 INJECTION INTRAVENOUS at 13:03

## 2020-03-17 RX ADMIN — SODIUM CHLORIDE, POTASSIUM CHLORIDE, SODIUM LACTATE AND CALCIUM CHLORIDE: 600; 310; 30; 20 INJECTION, SOLUTION INTRAVENOUS at 12:59

## 2020-03-17 RX ADMIN — PANTOPRAZOLE SODIUM 40 MG: 40 TABLET, DELAYED RELEASE ORAL at 11:36

## 2020-03-17 RX ADMIN — ONDANSETRON 4 MG: 2 INJECTION INTRAMUSCULAR; INTRAVENOUS at 20:02

## 2020-03-17 RX ADMIN — Medication 3 MG: at 15:05

## 2020-03-17 RX ADMIN — Medication 50 MG: at 13:17

## 2020-03-17 RX ADMIN — ROCURONIUM BROMIDE 20 MG: 10 INJECTION INTRAVENOUS at 14:14

## 2020-03-17 RX ADMIN — ONDANSETRON HYDROCHLORIDE 4 MG: 2 INJECTION, SOLUTION INTRAVENOUS at 14:38

## 2020-03-17 RX ADMIN — SODIUM CHLORIDE, POTASSIUM CHLORIDE, SODIUM LACTATE AND CALCIUM CHLORIDE: 600; 310; 30; 20 INJECTION, SOLUTION INTRAVENOUS at 14:10

## 2020-03-17 RX ADMIN — Medication 1 G: at 14:47

## 2020-03-17 RX ADMIN — CEFAZOLIN SODIUM 2 G: 2 INJECTION, SOLUTION INTRAVENOUS at 22:37

## 2020-03-17 RX ADMIN — PROPOFOL 150 MG: 10 INJECTION, EMULSION INTRAVENOUS at 13:03

## 2020-03-17 RX ADMIN — Medication 1 G: at 13:25

## 2020-03-17 RX ADMIN — FENTANYL CITRATE 50 MCG: 50 INJECTION, SOLUTION INTRAMUSCULAR; INTRAVENOUS at 13:34

## 2020-03-17 RX ADMIN — HYDROMORPHONE HYDROCHLORIDE 1 MG: 1 INJECTION, SOLUTION INTRAMUSCULAR; INTRAVENOUS; SUBCUTANEOUS at 15:12

## 2020-03-17 RX ADMIN — CEFAZOLIN 1 G: 1 INJECTION, POWDER, FOR SOLUTION INTRAMUSCULAR; INTRAVENOUS at 15:00

## 2020-03-17 ASSESSMENT — MIFFLIN-ST. JEOR: SCORE: 1516.94

## 2020-03-17 NOTE — ANESTHESIA PREPROCEDURE EVALUATION
Anesthesia Pre-Procedure Evaluation    Patient: Justine Michelle   MRN: [id number] : 1945          Preoperative Diagnosis: DJD (degenerative joint disease) [M19.90]    Procedure(s):  Left total hip arthroplasty    Past Medical History:   Diagnosis Date     Absence of menstruation     not on HRT     Acute abdominal pain 2013     Diverticulitis of colon 2013    also ,      Diverticulosis of colon (without mention of hemorrhage)     colonoscopy due in      Myalgia and myositis, unspecified      Obesity, unspecified      Other and unspecified hyperlipidemia      Palpitations      Past Surgical History:   Procedure Laterality Date     APPENDECTOMY       C NONSPECIFIC PROCEDURE  2006    nl     C NONSPECIFIC PROCEDURE      nl     CHOLECYSTECTOMY, LAPOROSCOPIC      Cholecystectomy, Laparoscopic     HC COLONOSCOPY THRU STOMA, DIAGNOSTIC      nl     ORTHOPEDIC SURGERY Left     buionette     TUBAL LIGATION       Anesthesia Evaluation     .             ROS/MED HX    ENT/Pulmonary:  - neg pulmonary ROS     Neurologic:  - neg neurologic ROS     Cardiovascular:  - neg cardiovascular ROS       METS/Exercise Tolerance:     Hematologic:  - neg hematologic  ROS       Musculoskeletal:  - neg musculoskeletal ROS       GI/Hepatic:  - neg GI/hepatic ROS       Renal/Genitourinary:  - ROS Renal section negative       Endo: Comment: .Body mass index is 33.99 kg/m .      (+) Obesity, .      Psychiatric:  - neg psychiatric ROS       Infectious Disease:  - neg infectious disease ROS       Malignancy:         Other: Comment: .Lab Test        20                       1536          0911          1517          1706          WBC          4.7          6.0          8.4          4.8           HGB          13.1         12.4          --          13.0          MCV          93           96            --          96            PLT          253          199            --          225            Lab Test        01/06/20 11/28/18 09/05/17                       1536          0911          1706          NA           139          140          141           POTASSIUM    3.7          3.9          3.7           CHLORIDE     106          106          106           CO2          25           25           29            BUN          15           16           18            CR           0.74         0.81         0.88          ANIONGAP     8            9            6             NIKKY          9.6          9.8          10.0          GLC          92           99           87                                     Physical Exam  Normal systems: cardiovascular, pulmonary and dental    Airway   Mallampati: II    Dental     Cardiovascular   Rhythm and rate: regular and normal      Pulmonary    breath sounds clear to auscultation            Lab Results   Component Value Date    WBC 4.7 01/06/2020    HGB 13.1 01/06/2020    HCT 40.7 01/06/2020     01/06/2020    SED 25 01/06/2020     01/06/2020    POTASSIUM 3.7 01/06/2020    CHLORIDE 106 01/06/2020    CO2 25 01/06/2020    BUN 15 01/06/2020    CR 0.74 01/06/2020    GLC 92 01/06/2020    NIKKY 9.6 01/06/2020    MAG 1.9 10/23/2014    ALBUMIN 3.8 01/06/2020    PROTTOTAL 7.5 01/06/2020    ALT 20 01/06/2020    AST 15 01/06/2020    ALKPHOS 76 01/06/2020    BILITOTAL 0.6 01/06/2020    LIPASE 63 04/16/2013    AMYLASE 43 04/16/2013    TSH 1.35 01/06/2020    T4 0.91 05/02/2016       Preop Vitals  BP Readings from Last 3 Encounters:   03/17/20 (!) 146/85   03/02/20 138/76   01/06/20 (!) 158/90    Pulse Readings from Last 3 Encounters:   03/02/20 82   01/06/20 73   11/28/18 73      Resp Readings from Last 3 Encounters:   03/17/20 18   03/02/20 16   01/06/20 16    SpO2 Readings from Last 3 Encounters:   03/17/20 97%   03/02/20 98%   01/06/20 95%      Temp Readings from Last 1 Encounters:   03/17/20 97.7  F (36.5  C) (Temporal)    Ht Readings from Last  "1 Encounters:   03/17/20 1.702 m (5' 7\")      Wt Readings from Last 1 Encounters:   03/17/20 98.4 kg (217 lb)    Estimated body mass index is 33.99 kg/m  as calculated from the following:    Height as of this encounter: 1.702 m (5' 7\").    Weight as of this encounter: 98.4 kg (217 lb).       Anesthesia Plan      History & Physical Review  History and physical reviewed and following examination; no interval change.    ASA Status:  2 .        Plan for General with Intravenous and Propofol induction. Maintenance will be Inhalation and Balanced.    PONV prophylaxis:  Ondansetron (or other 5HT-3) and Dexamethasone or Solumedrol  Patient request GA over spinal        Postoperative Care  Postoperative pain management:  IV analgesics, Oral pain medications and Multi-modal analgesia.      Consents  Anesthetic plan, risks, benefits and alternatives discussed with:  Patient or representative..                 Korey Douglas DO                    .  "

## 2020-03-17 NOTE — OP NOTE
Procedure Date: 03/17/2020      PREOPERATIVE DIAGNOSIS:  Left hip primary osteoarthritis.      POSTOPERATIVE DIAGNOSIS:  Left hip primary osteoarthritis.      PROCEDURE:  Left total hip arthroplasty using a Biomet G7 acetabulum and Taperloc femoral stem.      SURGEON:  Bulmaro Douglass MD      FIRST ASSISTANT:  Marni Coe PA-C      INDICATIONS:  Ms. Michelle is a very pleasant 74-year-old female who has had ongoing left hip pain for some time now and has failed conservative management with oral analgesics, activity modifications, and injections.  She now wishes to proceed with operative intervention.  We had a long discussion of risks, benefits and alternatives of total hip arthroplasty.  She understands these and wished to proceed with surgery.      NARRATIVE EVENTS:  After thorough evaluation and proper identification of the patient to be operated on, Ms. Michelle was taken to the operating room, where she underwent a general anesthetic, placed supine in the left lateral decubitus position on the operating table, and the left hip was prepped and draped in the usual sterile manner.  After appropriate surgical pause to confirm the patient's correct extremity to be operated on, 10 minutes, received 2 grams of Ancef.  Her left hip was approached through a lateral incision centered over the greater trochanter.  Skin and soft tissue were sharply dissected down to the tensor fascia.  Fascia was then split in line with the fibers in line with the femur and taken down to the trochanteric bursa.  Bursal tissue was removed.  The anterior one-third of the gluteus medius was removed off the greater trochanter in modified Hardinge fashion.  Of note, the anterior one-third of the gluteus medius tendinous attachment was already somewhat attenuated and partially detached and had retracted somewhat from the greater trochanter.  We T'd the joint capsule, and this took us down onto the femoral neck and femoral head, which were  surgically dislocated then from the acetabulum.  We made our femoral neck osteotomy 12 mm proximal to the lesser trochanter, removing the femoral head from the field in line with our Biomet Taperloc template.  Once this was done, we then re-exposed the acetabulum, removed ligament of teres, transverse acetabular ligament and labrum and sequentially reamed the acetabulum up to fit a size 50 Biomet G7 acetabular component.  We impacted this into position, approximately 40 degrees of abduction, 20 degrees of anteversion.  We had good primary stability of the cup, but we augmented this with 2 screws, 1 in the inner table of the pelvis, 1 in the posterior column.  Once this was done, we placed a flat liner to fit a size 36 mm femoral head and paid our attention to the femur.  Here we removed the bone from the piriformis fossa using entry reamer lateralizer and sequentially broached the femur up to fit a size 12 Biomet Taperloc femoral stem with a standard neck offset and a -3 neck length on a 36 mm femoral head.  This gave us good leg length and appropriate stability and soft tissue tension.  At this point, we placed our real size 12 Taperloc femoral stem with standard neck offset.  Once this was solidly into position, we retrialed our femoral heads and found that the -3 neck length on a 36 mm femoral head gave us the appropriate stability, range of motion and soft tissue tension and leg length.  So at this point, a Biolox ceramic 36 mm femoral head with a -3 neck length was impacted onto the trunnion.  The hip was thoroughly irrigated with both saline and IrriSept.  The hip was reduced.  The joint capsule was closed with interrupted 0 Vicryl sutures.  We repaired the abductors, including that which had been attenuated off the greater trochanter back to its normal bed using #5 Ethibond sutures through bone tunnels and interrupted 0 Vicryl sutures.  We then closed the tensor fascia with interrupted 0 Vicryl sutures and a  running #1 Stratafix suture, closed skin and soft tissue with absorbable sutures and staples in the skin.  The patient was then placed in a well-padded postoperative dressing and taken to the recovery room in stable condition.  She tolerated the procedure without difficulty.         JODI TRAORE MD             D: 2020   T: 2020   MT: GERALD      Name:     DAVIS BRIDGES   MRN:      [id number]        Account:        [id number]   :      1945           Procedure Date: 2020      Document: [id number]

## 2020-03-17 NOTE — ANESTHESIA POSTPROCEDURE EVALUATION
Patient: Justine Michelle    Procedure(s):  Left total hip arthroplasty    Diagnosis:DJD (degenerative joint disease) [M19.90]  Diagnosis Additional Information: No value filed.    Anesthesia Type:  General    Note:  Anesthesia Post Evaluation    Patient location during evaluation: PACU  Patient participation: Able to fully participate in evaluation  Level of consciousness: awake  Pain management: adequate  Airway patency: patent  Cardiovascular status: acceptable  Respiratory status: acceptable  Hydration status: acceptable  PONV: controlled     Anesthetic complications: None          Last vitals:  Vitals:    03/17/20 1555 03/17/20 1600 03/17/20 1615   BP: (!) 150/77 (!) 144/81 (!) 159/76   Pulse: 60 61    Resp: 11 9 8   Temp:      SpO2: 100% 100% 100%         Electronically Signed By: Korey Douglas DO  March 17, 2020  4:27 PM

## 2020-03-17 NOTE — PHARMACY-ADMISSION MEDICATION HISTORY
Pharmacy reviewed prior to admission med list from pre-admitting rnOCTAVIO        Prior to Admission medications    Medication Sig Last Dose Taking? Auth Provider   calcium carbonate 750 MG CHEW Take 1,500 mg by mouth 2 times daily  Yes Unknown, Entered By History   Cyanocobalamin (B-12 SUPER STRENGTH) 5000 MCG/ML LIQD Place 0.5 mLs under the tongue daily FOR VITAMIN B12 SUPPLEMENTATION, PLEASE PLACE UNDER THE TONGUE Past Week at Unknown time Yes Adriana Hernandez MD   FIBER PO Take 1 tablet by mouth daily as needed  3/16/2020 at 2100 Yes Reported, Patient   latanoprost (XALATAN) 0.005 % ophthalmic solution Place 1 drop into both eyes daily  3/16/2020 at 2100 Yes Reported, Patient   vitamin D3 (CHOLECALCIFEROL) 17776 units capsule Take 1 capsule (50,000 Units) by mouth every 14 days TAKING ONE TAB TWICE A MONTH Past Week at Unknown time Yes Corey Ambrose MD   B Complex-C (SUPER B COMPLEX PO) Take 1 tablet by mouth daily  More than a month at Unknown time  Reported, Patient

## 2020-03-17 NOTE — PROGRESS NOTES
Pt arrived to floor from PACU around 1810.  Arrived in hospital bed.  VSS and denies pain at rest.  3L oxygen via nasal canula with O2 sats in upper 90's.  Pt easy to arouse but falls asleep when not interacted with.  Pt orientated to room, call light, and staff.  Will continue to monitor.

## 2020-03-17 NOTE — BRIEF OP NOTE
[facility]    Brief Operative Note    Pre-operative diagnosis: DJD (degenerative joint disease) [M19.90]  Post-operative diagnosis Same as pre-operative diagnosis    Procedure: Procedure(s):  Left total hip arthroplasty  Surgeon: Surgeon(s) and Role:     * Bulmaro Douglass MD - Primary     * Marni Coe PA-C - Assisting  Anesthesia: Choice   Estimated blood loss: 200 ml  Drains: None  Specimens:   ID Type Source Tests Collected by Time Destination   1 : Left femoral head Tissue Hip, Left OR DOCUMENTATION ONLY Bulmaro Douglass MD 3/17/2020  2:39 PM      Findings:   None.  Complications: None.  Implants:   Implant Name Type Inv. Item Serial No.  Lot No. LRB No. Used Action   IMP SHELL BIOM G7 ACETAB PPS DOBSON HOLE 50MM SZ D [id number] Total Joint Component/Insert IMP SHELL BIOM G7 ACETAB PPS DOBSON HOLE 50MM SZ D [id number]  [employer] [id number] Left 1 Implanted   IMP SCR ZIM 6.5X20MM ACET CUP SELF TAP -330-20 Metallic Hardware/[place] IMP SCR ZIM 6.5X20MM ACET CUP SELF TAP -505-20  [employer] [id number] Left 1 Implanted   IMP LINER BIOM G7 ACET DELIA ARCOMXL CRSLNK 36MM SZ D [id number] Total Joint Component/Insert IMP LINER BIOM G7 ACET DELIA ARCOMXL CRSLNK 36MM SZ D [id number]  [employer] [id number] Left 1 Implanted   IMP SCR ZIM 6.5X20MM ACET CUP SELF TAP -962-20 Metallic Hardware/[place] IMP SCR ZIM 6.5X20MM ACET CUP SELF TAP -321-20  [employer] [id number] Left 1 Implanted   Taperloc Complete Primary Femoral Porous Coated Stem Reduced Distal 12 x 144 mm Standard Offset Type 1 Taper    BIOMET [id number] Left 1 Implanted   Biolox Delta Hip System Modular Ceramic Head Type I Taper 36 mm Head -3 mm neck    [employer]ET [id number] Left 1 Implanted

## 2020-03-17 NOTE — ANESTHESIA CARE TRANSFER NOTE
Patient: Justine Michelle    Procedure(s):  Left total hip arthroplasty    Diagnosis: DJD (degenerative joint disease) [M19.90]  Diagnosis Additional Information: No value filed.    Anesthesia Type:   General     Note:    Patient transferred to:PACU  Comments: PT spont resps ETT removed to PACU VSS Report to RNHandoff Report: Identifed the Patient, Identified the Reponsible Provider, Reviewed the pertinent medical history, Discussed the surgical course, Reviewed Intra-OP anesthesia mangement and issues during anesthesia, Set expectations for post-procedure period and Allowed opportunity for questions and acknowledgement of understanding      Vitals: (Last set prior to Anesthesia Care Transfer)    CRNA VITALS  3/17/2020 1455 - 3/17/2020 1529      3/17/2020             Pulse:  77    SpO2:  97 %                Electronically Signed By: CARMEN Collier CRNA  March 17, 2020  3:29 PM

## 2020-03-18 ENCOUNTER — APPOINTMENT (OUTPATIENT)
Dept: OCCUPATIONAL THERAPY | Facility: CLINIC | Age: 75
DRG: 470 | End: 2020-03-18
Attending: ORTHOPAEDIC SURGERY
Payer: COMMERCIAL

## 2020-03-18 ENCOUNTER — APPOINTMENT (OUTPATIENT)
Dept: PHYSICAL THERAPY | Facility: CLINIC | Age: 75
DRG: 470 | End: 2020-03-18
Attending: ORTHOPAEDIC SURGERY
Payer: COMMERCIAL

## 2020-03-18 LAB
GLUCOSE SERPL-MCNC: 119 MG/DL (ref 70–99)
HGB BLD-MCNC: 11.1 G/DL (ref 11.7–15.7)

## 2020-03-18 PROCEDURE — 97116 GAIT TRAINING THERAPY: CPT | Mod: GP | Performed by: PHYSICAL THERAPIST

## 2020-03-18 PROCEDURE — 12000000 ZZH R&B MED SURG/OB

## 2020-03-18 PROCEDURE — 25800030 ZZH RX IP 258 OP 636: Performed by: ORTHOPAEDIC SURGERY

## 2020-03-18 PROCEDURE — 97535 SELF CARE MNGMENT TRAINING: CPT | Mod: GO

## 2020-03-18 PROCEDURE — 36415 COLL VENOUS BLD VENIPUNCTURE: CPT | Performed by: ORTHOPAEDIC SURGERY

## 2020-03-18 PROCEDURE — 85018 HEMOGLOBIN: CPT | Performed by: ORTHOPAEDIC SURGERY

## 2020-03-18 PROCEDURE — 97110 THERAPEUTIC EXERCISES: CPT | Mod: GP | Performed by: PHYSICAL THERAPIST

## 2020-03-18 PROCEDURE — 82947 ASSAY GLUCOSE BLOOD QUANT: CPT | Performed by: ORTHOPAEDIC SURGERY

## 2020-03-18 PROCEDURE — 97530 THERAPEUTIC ACTIVITIES: CPT | Mod: GP | Performed by: PHYSICAL THERAPIST

## 2020-03-18 PROCEDURE — 25000128 H RX IP 250 OP 636: Performed by: ORTHOPAEDIC SURGERY

## 2020-03-18 PROCEDURE — 25000132 ZZH RX MED GY IP 250 OP 250 PS 637: Performed by: ORTHOPAEDIC SURGERY

## 2020-03-18 PROCEDURE — 97165 OT EVAL LOW COMPLEX 30 MIN: CPT | Mod: GO

## 2020-03-18 RX ORDER — OXYCODONE AND ACETAMINOPHEN 5; 325 MG/1; MG/1
1-2 TABLET ORAL EVERY 4 HOURS PRN
Qty: 60 TABLET | Refills: 0 | Status: SHIPPED | OUTPATIENT
Start: 2020-03-18 | End: 2020-08-20

## 2020-03-18 RX ORDER — AMOXICILLIN 250 MG
1 CAPSULE ORAL 2 TIMES DAILY
Qty: 30 TABLET | Refills: 0 | Status: SHIPPED | OUTPATIENT
Start: 2020-03-18 | End: 2020-08-20

## 2020-03-18 RX ORDER — HYDROXYZINE HYDROCHLORIDE 25 MG/1
25-50 TABLET, FILM COATED ORAL EVERY 6 HOURS PRN
Qty: 60 TABLET | Refills: 0 | Status: SHIPPED | OUTPATIENT
Start: 2020-03-18 | End: 2020-08-20

## 2020-03-18 RX ADMIN — OXYCODONE HYDROCHLORIDE 5 MG: 5 TABLET ORAL at 06:52

## 2020-03-18 RX ADMIN — ACETAMINOPHEN 975 MG: 325 TABLET, FILM COATED ORAL at 22:39

## 2020-03-18 RX ADMIN — HYDROXYZINE HYDROCHLORIDE 25 MG: 25 TABLET, FILM COATED ORAL at 11:25

## 2020-03-18 RX ADMIN — OXYCODONE HYDROCHLORIDE 10 MG: 5 TABLET ORAL at 22:39

## 2020-03-18 RX ADMIN — SENNOSIDES AND DOCUSATE SODIUM 2 TABLET: 8.6; 5 TABLET ORAL at 20:34

## 2020-03-18 RX ADMIN — LATANOPROST 1 DROP: 50 SOLUTION OPHTHALMIC at 20:39

## 2020-03-18 RX ADMIN — OXYCODONE HYDROCHLORIDE 5 MG: 5 TABLET ORAL at 12:50

## 2020-03-18 RX ADMIN — ENOXAPARIN SODIUM 40 MG: 40 INJECTION SUBCUTANEOUS at 08:17

## 2020-03-18 RX ADMIN — SENNOSIDES AND DOCUSATE SODIUM 2 TABLET: 8.6; 5 TABLET ORAL at 08:12

## 2020-03-18 RX ADMIN — ACETAMINOPHEN 975 MG: 325 TABLET, FILM COATED ORAL at 05:59

## 2020-03-18 RX ADMIN — OXYCODONE HYDROCHLORIDE 10 MG: 5 TABLET ORAL at 19:42

## 2020-03-18 RX ADMIN — HYDROXYZINE HYDROCHLORIDE 25 MG: 25 TABLET, FILM COATED ORAL at 22:39

## 2020-03-18 RX ADMIN — SODIUM CHLORIDE: 9 INJECTION, SOLUTION INTRAVENOUS at 06:30

## 2020-03-18 RX ADMIN — ONDANSETRON 4 MG: 2 INJECTION INTRAMUSCULAR; INTRAVENOUS at 02:20

## 2020-03-18 RX ADMIN — CELECOXIB 200 MG: 200 CAPSULE ORAL at 20:34

## 2020-03-18 RX ADMIN — OXYCODONE HYDROCHLORIDE 10 MG: 5 TABLET ORAL at 15:51

## 2020-03-18 RX ADMIN — HYDROXYZINE HYDROCHLORIDE 25 MG: 25 TABLET, FILM COATED ORAL at 05:16

## 2020-03-18 RX ADMIN — ACETAMINOPHEN 975 MG: 325 TABLET, FILM COATED ORAL at 13:35

## 2020-03-18 RX ADMIN — OXYCODONE HYDROCHLORIDE 5 MG: 5 TABLET ORAL at 09:51

## 2020-03-18 RX ADMIN — POLYETHYLENE GLYCOL 3350 17 G: 17 POWDER, FOR SOLUTION ORAL at 08:13

## 2020-03-18 RX ADMIN — ONDANSETRON 4 MG: 2 INJECTION INTRAMUSCULAR; INTRAVENOUS at 08:15

## 2020-03-18 RX ADMIN — CELECOXIB 200 MG: 200 CAPSULE ORAL at 08:13

## 2020-03-18 RX ADMIN — Medication 2500 MCG: at 09:47

## 2020-03-18 ASSESSMENT — ACTIVITIES OF DAILY LIVING (ADL)
PREVIOUS_RESPONSIBILITIES: MEAL PREP;HOUSEKEEPING;LAUNDRY;SHOPPING;YARDWORK;MEDICATION MANAGEMENT;FINANCES;DRIVING
ADLS_ACUITY_SCORE: 13
ADLS_ACUITY_SCORE: 13

## 2020-03-18 NOTE — PLAN OF CARE
A/O.  CMS intact.  Dressing D/I to left hip.  Increased Oxycodone to 10mg to help with pain along with Atarax. Up with Ax1 et walker.  Voiding.  Tolerating regular diet. Discharge to home tomorrow.  Will continue to monitor.

## 2020-03-18 NOTE — CONSULTS
Consult noted.  Chart reviewed.  BP running a bit high but mainly systolics are high / diastolics fine.  No history of hypertension.  No medical issues that require ongoing IM coverage.  Will add PRN hydralazine for BP but do not plan to see although am happy to see if any new issues should arise.     Paolo PAC

## 2020-03-18 NOTE — PLAN OF CARE
"Evening RN  Pt O x4, very sleepy throughout shift- improving slowly throughout shift.  Easily awakened to voice but still dozes off when not being interacted with.  VSS and afebrile.  Capnography in place and on 3L oxygen.  Pain managed adequately with scheduled PO Tylenol and 1 dose of PRN PO atarax - pt denied pain while at rest, but with movement and sitting up pain increased substantially.  Reported muscle spasm/\"charley horse\" type pain.  CMS intact.  Dressing CDI.  Up A2 with walker and gait belt - sat at bedside and then immediately laid back down because she was lightheaded and painful. Contreras removed around 1900 - due to void.  Tolerating clear liquid diet well - has reported no nausea.  Will continue to monitor.   "

## 2020-03-18 NOTE — PLAN OF CARE
PT: PT eval completed. Pt is status post L CARLEY. Pt lives alone in 2 story home with 3 stairs to enter.   Discharge Planner PT   Patient plan for discharge: home with friend to support for 1-2 days following   Current status: Pt educated on WBAT, PT role and POC. Pt was cued for supine to sit. Pt needing min A for performance. Pt was educated on how to perform sit<>Stand with hand placement and technique with FWW, min A x 2. Cues for LE for better tolerance. Pt was educated on use of walker with ambulation, cues for step progression. Pt having a difficult time with swing phase on the L LE. Pt was educated on step through, posture, needing manual cues. CGAx 2. 50 feet.   Anticipated status at discharge: Pt likely will need min A for bed mobility and stairs at discharge but likely mod inde with transfers and ambulation.

## 2020-03-18 NOTE — PLAN OF CARE
OT: Order received, eval completed and treatment initiated. Pt is 74 year old female s/p L CARLEY. Pt lives alone in house, stairs to enter/within, able to stay on main level temporarily, walk in shower, comfort height toilet. Owns shower chair and commode. Neighbor will be staying with patient for 1-2 days. Pt reports indep in all ADLs, IADLs and mobility tasks with no AD at baseline. Pt is retired.    Patient plan: Home w/ neighbor's assist for 1-2 days  Current status: Pt required mod A for bed mobility supine > sit EOB, limited ability to advance LLE due to pain/weakness. Pt sat EOB with SBA, educated on compensatory technique and LH AE for ease of LB dressing, declined to attempt this date. Pt declined OOB activity, able to scoot hips to top of bed, required max A for sit >supine for LE mgmt up into bed and repositioning once supine. Pt with c/o 7/10 pain in LLE. Pt educated on home safety modifications/recommendations, DME/AE and resources, car transfer technique and safe activities post CARLEY, verbalized understanding. Limited by pain this date.   Anticipated status at discharge: Anticipate pt will require mod A for bed mobility, Ax1 for transfers and mod A for LB dressing/bathing currently

## 2020-03-18 NOTE — PROGRESS NOTES
03/18/20 1131   Quick Adds   Type of Visit Initial Occupational Therapy Evaluation   Living Environment   Lives With alone   Living Arrangements house   Home Accessibility stairs to enter home;stairs within home   Number of Stairs, Main Entrance 3   Stair Railings, Main Entrance railings safe and in good condition   Transportation Anticipated car, drives self;family or friend will provide   Living Environment Comment Pt lives alone in house, stairs to enter/within, able to stay on main level temporarily, walk in shower, comfort height toilet. Owns shower chair and commode. Neighbor will be staying with patient for 1-2 days   Self-Care   Usual Activity Tolerance good   Current Activity Tolerance good   Regular Exercise No   Equipment Currently Used at Home cane, straight;shower chair   Activity/Exercise/Self-Care Comment Enjoys gardening   Functional Level   Ambulation 0-->independent   Transferring 0-->independent   Toileting 0-->independent   Bathing 0-->independent   Dressing 0-->independent   Eating 0-->independent   Communication 0-->understands/communicates without difficulty   Swallowing 0-->swallows foods/liquids without difficulty   Cognition 0 - no cognition issues reported   Fall history within last six months no   Which of the above functional risks had a recent onset or change? transferring;toileting;bathing;dressing   Prior Functional Level Comment Pt reports indep in all ADLs, IADLs and mobility tasks with no AD at baseline. Pt is retired.    General Information   Onset of Illness/Injury or Date of Surgery - Date 03/17/20   Referring Physician Bulmaro Douglass MD   Patient/Family Goals Statement Pt's goal is to d/c home   Additional Occupational Profile Info/Pertinent History of Current Problem Per chart: Pt is 74 year old female s/p L CARLEY   Precautions/Limitations fall precautions   Weight-Bearing Status - LLE weight-bearing as tolerated   General Info Comments No hip precautions per MD    Pain Assessment   Patient Currently in Pain Yes, see Vital Sign flowsheet  (7/10 pain in LLE)   Range of Motion (ROM)   ROM Comment BUEs WFL   Strength   Strength Comments BUEs WFL   Bed Mobility Skill: Sit to Supine   Level of [place]: Sit/Supine maximum assist (25% patients effort)   Physical Assist/Nonphysical Assist: Sit/Supine 1 person assist   Bed Mobility Skill: Supine to Sit   Level of [place]: Supine/Sit moderate assist (50% patients effort)   Physical Assist/Nonphysical Assist: Supine/Sit 1 person assist   Transfer Skills   Transfer Comments Declined OOB activity, agreeable to EOB due to pain   Lower Body Dressing   Level of [place]: Dress Lower Body moderate assist (50% patients effort)   Physical Assist/Nonphysical Assist: Dress Lower Body 1 person assist   Toileting   Level of [place]: Toilet moderate assist (50% patients effort)   Physical Assist/Nonphysical Assist: Toilet 1 person assist   Grooming   Level of [place]: Grooming contact guard   Physical Assist/Nonphysical Assist: Grooming 1 person assist   Instrumental Activities of Daily Living (IADL)   Previous Responsibilities meal prep;housekeeping;laundry;shopping;yardwork;medication management;finances;driving   IADL Comments Indep at baseline, neighbor available to assist 1-2 days   Activities of Daily Living Analysis   Impairments Contributing to Impaired Activities of Daily Living pain   ADL Comments Pt presents to OT below baseline level of functioning in all areas of self cares including bathing, dressing, grooming and toileting   General Therapy Interventions   Planned Therapy Interventions ADL retraining;IADL retraining;strengthening;transfer training   Clinical Impression   Criteria for Skilled Therapeutic Interventions Met yes, treatment indicated   OT Diagnosis Impaired ADLs, IADLs and mobility tasks   Influenced by the following impairments Decreased functional activity tolerance and strength, pain  "  Assessment of Occupational Performance 5 or more Performance Deficits   Identified Performance Deficits Bathing, dressing, grooming, toileting, homemaking, transfers   Clinical Decision Making (Complexity) Low complexity   Therapy Frequency Daily   Predicted Duration of Therapy Intervention (days/wks) 3 days   Anticipated Discharge Disposition Other (see comments)  (Defer to ortho surgeon)   Risks and Benefits of Treatment have been explained. Yes   Patient, Family & other staff in agreement with plan of care Yes   Clinical Impression Comments Pt would benefit from skilled OT to maximize safety and indep in all ADLs, IADLs and mobility tasks due to current deficits impacting function    [facility] AM-PAC  \"6 Clicks\" Daily Activity Inpatient Short Form   1. Putting on and taking off regular lower body clothing? 2 - A Lot   2. Bathing (including washing, rinsing, drying)? 2 - A Lot   3. Toileting, which includes using toilet, bedpan or urinal? 2 - A Lot   4. Putting on and taking off regular upper body clothing? 3 - A Little   5. Taking care of personal grooming such as brushing teeth? 3 - A Little   6. Eating meals? 4 - None   Daily Activity Raw Score (Score out of 24.Lower scores equate to lower levels of function) 16   Total Evaluation Time   Total Evaluation Time (Minutes) 10     "

## 2020-03-18 NOTE — PROGRESS NOTES
03/18/20 0959   Quick Adds   Type of Visit Initial PT Evaluation   Living Environment   Lives With alone   Living Arrangements house   Home Accessibility stairs to enter home;stairs within home   Number of Stairs, Main Entrance 3   Stair Railings, Main Entrance railings safe and in good condition   Number of Stairs, Within Home, Primary other (see comments)  (12, 1)   Stair Railings, Within Home, Primary railings safe and in good condition   Living Environment Comment Pt has a nieghbor staying with her for at least 1-2 days following discharge.    Self-Care   Usual Activity Tolerance good   Current Activity Tolerance good   Regular Exercise No   Equipment Currently Used at Home cane, straight;shower chair  (comfort hieght toilet)   Functional Level Prior   Ambulation 0-->independent   Transferring 0-->independent   Toileting 0-->independent   Bathing 0-->independent   Communication 0-->understands/communicates without difficulty   Swallowing 0-->swallows foods/liquids without difficulty   Cognition 0 - no cognition issues reported   Fall history within last six months no   Prior Functional Level Comment Pt performing all own cares   General Information   Onset of Illness/Injury or Date of Surgery - Date 03/17/20   Referring Physician Dr. Vivas   Patient/Family Goals Statement Pt is hoping to DC home tomorrow   Pertinent History of Current Problem (include personal factors and/or comorbidities that impact the POC) Pt is status post L CARLEY. Of note R knee OA   Precautions/Limitations fall precautions   Weight-Bearing Status - LLE weight-bearing as tolerated   Cognitive Status Examination   Orientation orientation to person, place and time   Level of Consciousness alert   Follows Commands and Answers Questions 100% of the time   Personal Safety and Judgment intact   Memory intact   Pain Assessment   Patient Currently in Pain Yes, see Vital Sign flowsheet  (7/10)   Integumentary/Edema   Integumentary/Edema Comments  "as expected   Posture    Posture Forward head position   Posture Comments forward flexed at hips   Range of Motion (ROM)   ROM Comment Pt limited hip and knee flexion   Strength   Strength Comments decreased strength in L hip flexion, difficult with heel slides, and with functional ambulation   Bed Mobility   Bed Mobility Comments min A needed   Transfer Skills   Transfer Comments min A x 2   Gait   Gait Comments CGA x 2, needing cues for improved swing phase on the L LE, 50 feet   Balance   Balance Comments heavy UE support with ambulation and with sitting EOB   Modality Interventions   Planned Modality Interventions Cryotherapy   General Therapy Interventions   Planned Therapy Interventions balance training;bed mobility training;gait training;strengthening;transfer training;risk factor education;home program guidelines;progressive activity/exercise   Clinical Impression   Criteria for Skilled Therapeutic Intervention yes, treatment indicated   PT Diagnosis decreased functional mobility status post CARLEY   Influenced by the following impairments decreased ROM, decreased strength, pain   Functional limitations due to impairments decreased bed mob, transfers, ambulation, stairs   Clinical Presentation Stable/Uncomplicated   Clinical Presentation Rationale slow improvement   Clinical Decision Making (Complexity) Low complexity   Therapy Frequency 2x/day   Predicted Duration of Therapy Intervention (days/wks) 3 days   Anticipated Equipment Needs at Discharge front wheeled walker   Anticipated Discharge Disposition Other (see comments)  (defer to ortho)   Risk & Benefits of therapy have been explained Yes   Patient, Family & other staff in agreement with plan of care Yes   [facility] T[employer] (formerly [employer]) TM \"6 Clicks\"   2016, Trustees of [facility], under license to [employer].  All rights reserved.   6 Clicks Short Forms Basic Mobility Inpatient Short Form   [facility] [employer]  \"6 Clicks\" V.2 Basic Mobility " Inpatient Short Form   1. Turning from your back to your side while in a flat bed without using bedrails? 3 - A Little   2. Moving from lying on your back to sitting on the side of a flat bed without using bedrails? 3 - A Little   3. Moving to and from a bed to a chair (including a wheelchair)? 3 - A Little   4. Standing up from a chair using your arms (e.g., wheelchair, or bedside chair)? 3 - A Little   5. To walk in hospital room? 3 - A Little   6. Climbing 3-5 steps with a railing? 2 - A Lot   Basic Mobility Raw Score (Score out of 24.Lower scores equate to lower levels of function) 17   Total Evaluation Time   Total Evaluation Time (Minutes) 10

## 2020-03-18 NOTE — PLAN OF CARE
"  [facility] Orthopedic Nursing Progress Note       Assessment   Assessment:  Post-operative day #1  Total hip arthoplasty (Left)     Doing well.  No excessive bleeding  Pain well-controlled.  BP (!) 161/89   Pulse 80   Temp 98.9  F (37.2  C) (Oral)   Resp 18   Ht 1.702 m (5' 7\")   Wt 98.4 kg (217 lb)   SpO2 100%   BMI 33.99 kg/m      CMS: is intact. Calves non-tender bilaterally.  Lungs: are clear, encouraged to use incentive spirometer w/a.  BS: hypoactive, no flatus yet, debby clear liquids, denies nausea.   Urine: DTV, bladder scanned once for 1cc and again for 168cc. Will have pt get up to void this am.  Surgical Site: Dressing is clean dry intact.   Pain: controlled with po pain meds. Ice to incisional area. C/o back ache- relieved with heating pad.  Activity: bedrest. Will get up to void this am.  Slept well.  0700: Up to BSC w/A2/walker. Unable to void. Scanned for 550cc. Straight cathed for 500cc rivera urine.      Marlo Spence RN   "

## 2020-03-18 NOTE — PROGRESS NOTES
"Orthopedic Surgery  3/18/2020    S: Patient voices no complaints today.     O: Blood pressure (!) 150/66, pulse 80, temperature 98.5  F (36.9  C), temperature source Temporal, resp. rate 16, height 1.702 m (5' 7\"), weight 98.4 kg (217 lb), SpO2 92 %.  Lab Results   Component Value Date    HGB 11.1 03/18/2020     No results found for: INR     Neurovascularly intact.  Calves are negative bilaterally, both soft and nontender.  The wound is C/D/I.  The wound looks good with minimal erythema of the surrounding skin.    A: Ms. Michelle is doing well status post Procedure(s):  Left total hip arthroplasty using a Biomet G7 acetabulum and Taperloc femoral stem.    P: Continue physical therapy.   Anticoagulation   Pain management  Discharge planning, likely home tomorrow    Bulmaro Douglass MD  [phone]  "

## 2020-03-19 ENCOUNTER — APPOINTMENT (OUTPATIENT)
Dept: OCCUPATIONAL THERAPY | Facility: CLINIC | Age: 75
DRG: 470 | End: 2020-03-19
Attending: ORTHOPAEDIC SURGERY
Payer: COMMERCIAL

## 2020-03-19 ENCOUNTER — APPOINTMENT (OUTPATIENT)
Dept: PHYSICAL THERAPY | Facility: CLINIC | Age: 75
DRG: 470 | End: 2020-03-19
Attending: ORTHOPAEDIC SURGERY
Payer: COMMERCIAL

## 2020-03-19 LAB
GLUCOSE SERPL-MCNC: 102 MG/DL (ref 70–99)
HGB BLD-MCNC: 9.6 G/DL (ref 11.7–15.7)

## 2020-03-19 PROCEDURE — 25000132 ZZH RX MED GY IP 250 OP 250 PS 637: Performed by: ORTHOPAEDIC SURGERY

## 2020-03-19 PROCEDURE — 25000128 H RX IP 250 OP 636: Performed by: ORTHOPAEDIC SURGERY

## 2020-03-19 PROCEDURE — 97535 SELF CARE MNGMENT TRAINING: CPT | Mod: GO

## 2020-03-19 PROCEDURE — 36415 COLL VENOUS BLD VENIPUNCTURE: CPT | Performed by: ORTHOPAEDIC SURGERY

## 2020-03-19 PROCEDURE — 97116 GAIT TRAINING THERAPY: CPT | Mod: GP | Performed by: PHYSICAL THERAPIST

## 2020-03-19 PROCEDURE — 97530 THERAPEUTIC ACTIVITIES: CPT | Mod: GP | Performed by: PHYSICAL THERAPIST

## 2020-03-19 PROCEDURE — 85018 HEMOGLOBIN: CPT | Performed by: ORTHOPAEDIC SURGERY

## 2020-03-19 PROCEDURE — 12000000 ZZH R&B MED SURG/OB

## 2020-03-19 PROCEDURE — 97110 THERAPEUTIC EXERCISES: CPT | Mod: GP | Performed by: PHYSICAL THERAPIST

## 2020-03-19 PROCEDURE — 82947 ASSAY GLUCOSE BLOOD QUANT: CPT | Performed by: ORTHOPAEDIC SURGERY

## 2020-03-19 RX ORDER — LATANOPROST 50 UG/ML
1 SOLUTION/ DROPS OPHTHALMIC EVERY EVENING
Status: DISCONTINUED | OUTPATIENT
Start: 2020-03-19 | End: 2020-03-20 | Stop reason: HOSPADM

## 2020-03-19 RX ADMIN — OXYCODONE HYDROCHLORIDE 10 MG: 5 TABLET ORAL at 06:35

## 2020-03-19 RX ADMIN — CELECOXIB 200 MG: 200 CAPSULE ORAL at 08:29

## 2020-03-19 RX ADMIN — OXYCODONE HYDROCHLORIDE 5 MG: 5 TABLET ORAL at 23:50

## 2020-03-19 RX ADMIN — HYDROXYZINE HYDROCHLORIDE 25 MG: 25 TABLET, FILM COATED ORAL at 21:42

## 2020-03-19 RX ADMIN — ACETAMINOPHEN 975 MG: 325 TABLET, FILM COATED ORAL at 21:42

## 2020-03-19 RX ADMIN — SENNOSIDES AND DOCUSATE SODIUM 2 TABLET: 8.6; 5 TABLET ORAL at 08:28

## 2020-03-19 RX ADMIN — ENOXAPARIN SODIUM 40 MG: 40 INJECTION SUBCUTANEOUS at 08:30

## 2020-03-19 RX ADMIN — OXYCODONE HYDROCHLORIDE 5 MG: 5 TABLET ORAL at 10:12

## 2020-03-19 RX ADMIN — HYDROXYZINE HYDROCHLORIDE 25 MG: 25 TABLET, FILM COATED ORAL at 10:12

## 2020-03-19 RX ADMIN — Medication 2500 MCG: at 08:29

## 2020-03-19 RX ADMIN — ACETAMINOPHEN 975 MG: 325 TABLET, FILM COATED ORAL at 06:35

## 2020-03-19 RX ADMIN — SENNOSIDES AND DOCUSATE SODIUM 2 TABLET: 8.6; 5 TABLET ORAL at 20:29

## 2020-03-19 RX ADMIN — OXYCODONE HYDROCHLORIDE 10 MG: 5 TABLET ORAL at 20:28

## 2020-03-19 RX ADMIN — OXYCODONE HYDROCHLORIDE 5 MG: 5 TABLET ORAL at 17:18

## 2020-03-19 RX ADMIN — LATANOPROST 1 DROP: 50 SOLUTION/ DROPS OPHTHALMIC at 21:46

## 2020-03-19 RX ADMIN — ACETAMINOPHEN 975 MG: 325 TABLET, FILM COATED ORAL at 13:31

## 2020-03-19 RX ADMIN — OXYCODONE HYDROCHLORIDE 5 MG: 5 TABLET ORAL at 13:31

## 2020-03-19 RX ADMIN — POLYETHYLENE GLYCOL 3350 17 G: 17 POWDER, FOR SOLUTION ORAL at 08:29

## 2020-03-19 RX ADMIN — CELECOXIB 200 MG: 200 CAPSULE ORAL at 20:29

## 2020-03-19 RX ADMIN — OXYCODONE HYDROCHLORIDE 10 MG: 5 TABLET ORAL at 02:42

## 2020-03-19 ASSESSMENT — ACTIVITIES OF DAILY LIVING (ADL)
ADLS_ACUITY_SCORE: 15

## 2020-03-19 NOTE — PLAN OF CARE
Patient plan: Home w/ neighbor's assist for 1-2 days  Current status: Pt completed bed mobility supine > sit EOB with min A for LLE advancement to EOB. Pt re-educated on compensatory technique for LB dressing, able to thread pants and don up over hips with SBA; set up for donning shirt over head. Pt completed sit > stand from EOB to FWW with CGA/SBA, ambulated to/from BR FWW level with CGA/SBA. Pt completed toilet transfer on/off standard height toilet with SBA and use of grab bar. Pt educated on walk in shower transfer, able to complete with min A for lifting LLE in/out over threshold of shower. Pt completed grooming/hygiene tasks of oral cares, face washing and hair care in stance at sink with SBA. Pt with report of 6/10 pain in LLE. Ongoing education provided on home safety strategies.   Anticipated status at discharge: Anticipate pt will require min A for bed mobility, supervision for bathing and assist for IADLs (driving, homemaking, etc)

## 2020-03-19 NOTE — PLAN OF CARE
Afeb, vss, cms intact, aquacel with scant drainage. Moving better today, up with assist of 1 and walker. Did stairs with therapy well this pm, walking farther. No pain at rest, moderate with activity after oxycodone. Going home tomorrow with neighbor.

## 2020-03-19 NOTE — PROGRESS NOTES
"Orthopedic Surgery  3/19/2020  POD#: 2    S: Patient voices no complaints today other than pain.    O: Blood pressure 123/59, pulse 80, temperature 99.4  F (37.4  C), temperature source Temporal, resp. rate 16, height 1.702 m (5' 7\"), weight 98.4 kg (217 lb), SpO2 98 %.  Lab Results   Component Value Date    HGB 9.6 03/19/2020     No results found for: INR     I/O last 3 completed shifts:  In: 1849 [P.O.:1620; I.V.:229]  Out: 1950 [Urine:1950]    Neurovascularly intact.  Calves are negative bilaterally, both soft and nontender.  The wound is C/D/I.  The wound looks good with minimal erythema of the surrounding skin.    A: Ms. Michelle is doing well status post Procedure(s):  Left total hip arthroplasty using a Biomet G7 acetabulum and Taperloc femoral stem.    P:   1. Mobilize and continue physical therapy. No hip precautions. Needs to practice stairs this afternoon.  2. Anticoagulation - discharge on ASA  3. Pain management - controlled  4. Anticipate discharge to home tomorrow.      Marni Coe PA-C  O: [phone]    "

## 2020-03-19 NOTE — PLAN OF CARE
A&O x4. VSS. LS CTA all fields. BS active x4. Dressing to L hip with scant amt of dried drainage. CMS intact. Up with A1 and walker. Oxycodone 10mg and atarax for pain. Vodiding in good amounts. Tolerating regular diet. Plans to discharge home today or tomorrow with assistance from friend.

## 2020-03-19 NOTE — PLAN OF CARE
Discharge Planner PT   Patient plan for discharge: home with friend to support for 1-2 days following   Current status: Pt sitting in bedside chair upon initiation, agreeable to session. Pt completes sit<>stand with SBA, increased time/effort. Pt ambulates 180' with use of FWW and SBA with cues for posture and step length. Pt completes up/down 4 stairs with both B rails and single rail. Pt able to complete with CGA and cuing for technique. Also discussed/practiced how to manage step down into living room. Pt completes sit>supine with [place] at LE. Did trial leg  and utilizing other LE to assist, but pt unable to complete at this time. Pt supine with all needs in reach at end of session.   Anticipated status at discharge: Pt likely will need min A for bed mobility, CGA for stairs, and likely mod inde with transfers and ambulation.

## 2020-03-19 NOTE — PLAN OF CARE
Discharge Planner PT   Patient plan for discharge: home with friend to support for 1-2 days following   Current status: Pt sitting in bedside chair upon initiation, agreeable to session. Pt completes sit<>stand with SBA, increased time/effort. Pt ambulates 150' with use of FWW and CGA progressing to SBA with cues for posture, pacing, step length, and walker management. Pt with increased pain toward end of ambulation. Pt completes sit>supine with [place]ou[place] at LE. Discussed use of leg , will trial with PM session. Pt supine at end of session, all needs in reach.   Anticipated status at discharge: Pt likely will need min A for bed mobility and stairs at discharge but likely mod inde with transfers and ambulation.

## 2020-03-20 ENCOUNTER — APPOINTMENT (OUTPATIENT)
Dept: OCCUPATIONAL THERAPY | Facility: CLINIC | Age: 75
DRG: 470 | End: 2020-03-20
Attending: ORTHOPAEDIC SURGERY
Payer: COMMERCIAL

## 2020-03-20 ENCOUNTER — APPOINTMENT (OUTPATIENT)
Dept: PHYSICAL THERAPY | Facility: CLINIC | Age: 75
DRG: 470 | End: 2020-03-20
Attending: ORTHOPAEDIC SURGERY
Payer: COMMERCIAL

## 2020-03-20 VITALS
OXYGEN SATURATION: 95 % | RESPIRATION RATE: 16 BRPM | WEIGHT: 217 LBS | BODY MASS INDEX: 34.06 KG/M2 | SYSTOLIC BLOOD PRESSURE: 112 MMHG | HEART RATE: 78 BPM | HEIGHT: 67 IN | DIASTOLIC BLOOD PRESSURE: 56 MMHG | TEMPERATURE: 98.5 F

## 2020-03-20 LAB
CREAT SERPL-MCNC: 0.85 MG/DL (ref 0.52–1.04)
GFR SERPL CREATININE-BSD FRML MDRD: 67 ML/MIN/{1.73_M2}
PLATELET # BLD AUTO: 171 10E9/L (ref 150–450)

## 2020-03-20 PROCEDURE — 97535 SELF CARE MNGMENT TRAINING: CPT | Mod: GO

## 2020-03-20 PROCEDURE — 82565 ASSAY OF CREATININE: CPT | Performed by: ORTHOPAEDIC SURGERY

## 2020-03-20 PROCEDURE — 25000132 ZZH RX MED GY IP 250 OP 250 PS 637: Performed by: ORTHOPAEDIC SURGERY

## 2020-03-20 PROCEDURE — 85049 AUTOMATED PLATELET COUNT: CPT | Performed by: ORTHOPAEDIC SURGERY

## 2020-03-20 PROCEDURE — 25000128 H RX IP 250 OP 636: Performed by: ORTHOPAEDIC SURGERY

## 2020-03-20 PROCEDURE — 97530 THERAPEUTIC ACTIVITIES: CPT | Mod: GP | Performed by: PHYSICAL THERAPIST

## 2020-03-20 PROCEDURE — 97530 THERAPEUTIC ACTIVITIES: CPT | Mod: GO

## 2020-03-20 PROCEDURE — 36415 COLL VENOUS BLD VENIPUNCTURE: CPT | Performed by: ORTHOPAEDIC SURGERY

## 2020-03-20 PROCEDURE — 97116 GAIT TRAINING THERAPY: CPT | Mod: GP | Performed by: PHYSICAL THERAPIST

## 2020-03-20 RX ADMIN — ACETAMINOPHEN 975 MG: 325 TABLET, FILM COATED ORAL at 06:18

## 2020-03-20 RX ADMIN — SENNOSIDES AND DOCUSATE SODIUM 2 TABLET: 8.6; 5 TABLET ORAL at 08:29

## 2020-03-20 RX ADMIN — CELECOXIB 200 MG: 200 CAPSULE ORAL at 08:29

## 2020-03-20 RX ADMIN — OXYCODONE HYDROCHLORIDE 10 MG: 5 TABLET ORAL at 11:52

## 2020-03-20 RX ADMIN — HYDROXYZINE HYDROCHLORIDE 25 MG: 25 TABLET, FILM COATED ORAL at 11:52

## 2020-03-20 RX ADMIN — OXYCODONE HYDROCHLORIDE 5 MG: 5 TABLET ORAL at 08:31

## 2020-03-20 RX ADMIN — POLYETHYLENE GLYCOL 3350 17 G: 17 POWDER, FOR SOLUTION ORAL at 08:28

## 2020-03-20 RX ADMIN — ENOXAPARIN SODIUM 40 MG: 40 INJECTION SUBCUTANEOUS at 08:29

## 2020-03-20 RX ADMIN — Medication 2500 MCG: at 08:29

## 2020-03-20 RX ADMIN — OXYCODONE HYDROCHLORIDE 5 MG: 5 TABLET ORAL at 04:59

## 2020-03-20 ASSESSMENT — ACTIVITIES OF DAILY LIVING (ADL)
ADLS_ACUITY_SCORE: 13

## 2020-03-20 NOTE — PLAN OF CARE
Patient plan: Home w/ neighbor's assist for 1-2 days  Current status: Pt continues to require min A for bed mobility supine > sit EOB for LLE advancement to EOB and increased time. Pt completed sit > stand from EOB to FWW with SBA, ambulated to/from BR FWW level with SBA. Pt completed toilet transfer and toileting tasks with supervision. Pt completed grooming/hygiene tasks in stance at sink with distant supervision. Lengthy time spent educating and trialing donning/doffing socks/shoes, pt able to don/doff RLE with set up, required use of sock aide, reacher and min A for lifting LLE to place in shoe, increased time required. Resources provided to obtain recommended DME/AE (reacher, sock aide, leg ), pt going to pursue.   Anticipated status at discharge: Anticipate pt will require min A for bed mobility, supervision for bathing and assist for IADLs (driving, homemaking, etc)      Occupational Therapy Discharge Summary    Reason for therapy discharge:    Discharged to home.    Progress towards therapy goal(s). See goals on Care Plan in [facility] electronic health record for goal details.  Goals met    Therapy recommendation(s):    Defer to ortho

## 2020-03-20 NOTE — PLAN OF CARE
Patient plan: home today with assist of friend  Current status: PT: patient limited by L hip pain this am; received pain meds about 15 minutes prior to session; able to perform bed mobility with extra time and use of cane to move L LE to EOB; assist to jeremy and doff shoes. Sit<>stand with mod I and good safety; able to ambulate with SBA to mod I with use of a walker 75 feet x 2; trial of up and down a platform step with SBA to mod I with use of a walker. Trial of cane and railing with SBA to mod I. Practiced trying to get L LE into bed at end of session; unable to currently do with adaptive equipment; Plans to sleep in a recliner chair; didn't feel she needed to practice CARLEY ex's. Goals met; walker issued per patient request  Anticipated status at discharge: anticipate status will be as listed above as patient plans to discharge around 1pm today; Will have assist of friend for a few days.

## 2020-03-20 NOTE — PROGRESS NOTES
"Orthopedic Surgery  3/20/2020    S: Patient voices no complaints today.     O: Blood pressure 114/48, pulse 80, temperature 97.6  F (36.4  C), temperature source Temporal, resp. rate 16, height 1.702 m (5' 7\"), weight 98.4 kg (217 lb), SpO2 94 %.  Lab Results   Component Value Date    HGB 9.6 03/19/2020     No results found for: INR     Neurovascularly intact.  Calves are negative bilaterally, both soft and nontender.  The wound is C/D/I.  The wound looks good with minimal erythema of the surrounding skin.    A: Ms. Mcihelle is doing well status post Procedure(s):  Left total hip arthroplasty using a Biomet G7 acetabulum and Taperloc femoral stem.    P: Continue physical therapy.   Anticoagulation home with ASA  Pain management  Discharge planning, home today    Bulmaro Douglass MD  [phone]    "

## 2020-03-20 NOTE — DISCHARGE INSTRUCTIONS
Return to clinic in10-14 days. Call [phone] to schedule or if you experience any problems before your scheduled appointment.      See general discharge instruction sheet for hips.  1. Do exercises as instructed by therapist.  2. Observe your hip precautions.  3. Walk daily increasing distance and time each day by a little.  4. Ice hip for swelling and discomfort.  5. May shower, inspect dressing daily for problems listed below   6.Notify your dentist or physician of your implant so you can get antibiotics before any dental work or any other invasive procedure (ie: colonoscopy).  7. Do not cross legs.      Aquacel dressing:  DO NOT CHANGE DRESSING DAILY.  Leave this dressing on until follow-up appointment with Orthopedic Surgeon.  Dressing is waterproof, can shower with it on, pat dry when done.  No soaking such as in tub baths, pools or hot tubs        While on narcotic pain medication, to prevent constipation:  1. Drink plenty of water to keep well hydrated   2. May take over the counter Colace or Senna (follow instructions on label)       Call your physician if: ([phone])   1. Persistent fever greater than 100 degrees with body chills or excessive sweating.  2. Increased/persistent redness, localized warmth, tenderness, drainage or swelling at incision site. Greater than 50% drainage on dressing.   3. Pain not controlled with oral pain medications, ice and rest.   4. No bowel movement in 3 days (may use Milk of Magnesia or other over the counter remedy).  5. Chest pain, shortness of breath, and/or calf pain with excessive swelling.  6. Generalized feeling of illness.  7. Any other questions or concerns related to your surgery/recovery.        Thank you for allowing [facility] to participate in your cares!!

## 2020-03-20 NOTE — PLAN OF CARE
Reviewed discharge instructions and medications with patient. Questions answered. Patient discharged to home with discharge instructions, medications (atarax, Percocet, and aspirin), and belongings at this time.

## 2020-03-20 NOTE — PLAN OF CARE
A&O x4. VSS. LS CTA all fields. BS active x4. Dressing to L hip with scant amt of dried drainage. CMS intact. Up with A1 and walker. Oxycodone and atarax for pain. Vodiding in good amounts. Tolerating regular diet. Plans to discharge home today with assistance from friend.

## 2020-03-20 NOTE — PLAN OF CARE
Physical Therapy Discharge Summary    Reason for therapy discharge:    Discharged to home.    Progress towards therapy goal(s). See goals on Care Plan in [facility] electronic health record for goal details.  Goals met    Therapy recommendation(s):    Continue home exercise program. Assist of friend as needed

## 2020-03-20 NOTE — PLAN OF CARE
Pt is A&Ox4. On room air. Pain managed with PO Tylenol and Oxycodone. CMS intact. Dressing to hip has scant dried drainage. Ambulating with A-1, walker, and gait belt.  Voiding spontaneously. Tolerating a regular diet. Plan to discharge home tomorrow. Plan of care reviewed with patient.

## 2020-03-24 NOTE — DISCHARGE SUMMARY
"Discharge Summary    Justine Michelle MRN# [id number]   YOB: 1945 Age: 74 year old     Date of Admission: 3/17/2020    Date of Discharge: 3/20/2020    Reason for Admission: Justine Michelle is an 74 year old female who was admitted to the hospital following surgery with Dr. Bulmaro Douglass.    Preoperative Diagnosis: DJD (degenerative joint disease) [M19.90]    Postoperative Diagnosis: DJD (degenerative joint disease) [M19.90]    Procedure Completed: left total hip arthroplasty     Hospital Course:  Ms. Michelle was admitted and underwent the above procedure. The patient tolerated the procedure well. There were no complications. Following surgery she was admitted to the floor.  During her stay she did not require any blood transfusions.  Her last hemoglobin was noted to be   Lab Results   Component Value Date    HGB 9.6 03/19/2020   .  Her pain was controlled with oral pain medication.  During her stay she progressed well in physical therapy and all the therapy goals were met.     Discharge Physical Exam:  /56   Pulse 78   Temp 98.5  F (36.9  C)   Resp 16   Ht 1.702 m (5' 7\")   Wt 98.4 kg (217 lb)   SpO2 95%   BMI 33.99 kg/m    Neurovascularly intact, distal pulses present bilaterally.  Calves are negative bilaterally, both soft and nontender.  The wound looks good with minimal erythema of the surrounding skin.    Assessment: Ms. Michelle is stable and doing well status post left total hip arthroplasty on POD #3.    Plan: We will discharge her home on analgesics and deep venous thrombosis prophylaxis.  She will follow-up with Marni Coe PA-C or Dr. Bulmaro Douglass approximately 2 weeks from surgery.  She may call [phone] to schedule an appointment.    Discharge Instructions:  Discharge diet: Regular   Discharge activity: Weight bear as tolerated.  Advance from the walker to a cane as tolerated.  Discontinue the use of cane when comfortable.   Physical therapy: Work on therapy " exercises given in the hospital.    Discharge follow-up: Follow up with Marni Ceo PA-C in 12-16 days.   Anticoagulation Asprin 325 mg twice daily for 5 weeks.   Wound care: Leave aquacel dressing in place until post-op follow-up.  If it becomes loose or soiled then remove and replace with daily dry dressings.  Keep wound clean and dry.  May get incision area wet in shower but do not soak or scrub.         Meds:   Justine Michelle Radha   Home Medication Instructions LAURO:[id number]    Printed on:03/24/20 0904   Medication Information                      aspirin (ASA) 325 MG EC tablet  Take one Aspirin tab twice daily for 5 weeks.             B Complex-C (SUPER B COMPLEX PO)  Take 1 tablet by mouth daily              calcium carbonate 750 MG CHEW  Take 1,500 mg by mouth 2 times daily             Cyanocobalamin (B-12 SUPER STRENGTH) 5000 MCG/ML LIQD  Place 0.5 mLs under the tongue daily FOR VITAMIN B12 SUPPLEMENTATION, PLEASE PLACE UNDER THE TONGUE             FIBER PO  Take 1 tablet by mouth daily as needed              hydrOXYzine (ATARAX) 25 MG tablet  Take 1-2 tablets (25-50 mg) by mouth every 6 hours as needed for other (muscle spasms)             latanoprost (XALATAN) 0.005 % ophthalmic solution  Place 1 drop into both eyes daily              oxyCODONE-acetaminophen (PERCOCET) 5-325 MG tablet  Take 1-2 tablets by mouth every 4 hours as needed for severe pain             senna-docusate (SENOKOT-S/PERICOLACE) 8.6-50 MG tablet  Take 1 tablet by mouth 2 times daily             vitamin D3 (CHOLECALCIFEROL) 07915 units capsule  Take 1 capsule (50,000 Units) by mouth every 14 days TAKING ONE TAB TWICE A MONTH                     Marni Coe PA-C  O: [phone]

## 2020-04-02 ENCOUNTER — TRANSFERRED RECORDS (OUTPATIENT)
Dept: HEALTH INFORMATION MANAGEMENT | Facility: CLINIC | Age: 75
End: 2020-04-02

## 2020-04-27 ENCOUNTER — TRANSFERRED RECORDS (OUTPATIENT)
Dept: HEALTH INFORMATION MANAGEMENT | Facility: CLINIC | Age: 75
End: 2020-04-27

## 2020-06-11 ENCOUNTER — TRANSFERRED RECORDS (OUTPATIENT)
Dept: HEALTH INFORMATION MANAGEMENT | Facility: CLINIC | Age: 75
End: 2020-06-11

## 2020-07-08 DIAGNOSIS — Z11.59 ENCOUNTER FOR SCREENING FOR OTHER VIRAL DISEASES: Primary | ICD-10-CM

## 2020-08-20 RX ORDER — ASCORBIC ACID 500 MG
1000 TABLET ORAL DAILY
COMMUNITY

## 2020-08-20 ASSESSMENT — MIFFLIN-ST. JEOR: SCORE: 1439.36

## 2020-08-31 ENCOUNTER — OFFICE VISIT (OUTPATIENT)
Dept: INTERNAL MEDICINE | Facility: CLINIC | Age: 75
End: 2020-08-31
Payer: COMMERCIAL

## 2020-08-31 ENCOUNTER — TELEPHONE (OUTPATIENT)
Dept: INTERNAL MEDICINE | Facility: CLINIC | Age: 75
End: 2020-08-31

## 2020-08-31 VITALS
HEIGHT: 67 IN | RESPIRATION RATE: 16 BRPM | WEIGHT: 206 LBS | HEART RATE: 69 BPM | TEMPERATURE: 98.2 F | OXYGEN SATURATION: 97 % | DIASTOLIC BLOOD PRESSURE: 72 MMHG | BODY MASS INDEX: 32.33 KG/M2 | SYSTOLIC BLOOD PRESSURE: 140 MMHG

## 2020-08-31 DIAGNOSIS — M25.561 CHRONIC PAIN OF RIGHT KNEE: ICD-10-CM

## 2020-08-31 DIAGNOSIS — G89.29 CHRONIC PAIN OF RIGHT KNEE: ICD-10-CM

## 2020-08-31 DIAGNOSIS — Z01.818 PRE-OP EXAM: Primary | ICD-10-CM

## 2020-08-31 LAB
BASOPHILS # BLD AUTO: 0 10E9/L (ref 0–0.2)
BASOPHILS NFR BLD AUTO: 0.6 %
DIFFERENTIAL METHOD BLD: ABNORMAL
EOSINOPHIL # BLD AUTO: 0.2 10E9/L (ref 0–0.7)
EOSINOPHIL NFR BLD AUTO: 5.7 %
ERYTHROCYTE [DISTWIDTH] IN BLOOD BY AUTOMATED COUNT: 14.6 % (ref 10–15)
HCT VFR BLD AUTO: 39.6 % (ref 35–47)
HGB BLD-MCNC: 12.8 G/DL (ref 11.7–15.7)
LYMPHOCYTES # BLD AUTO: 1.2 10E9/L (ref 0.8–5.3)
LYMPHOCYTES NFR BLD AUTO: 32.8 %
MCH RBC QN AUTO: 30.5 PG (ref 26.5–33)
MCHC RBC AUTO-ENTMCNC: 32.3 G/DL (ref 31.5–36.5)
MCV RBC AUTO: 94 FL (ref 78–100)
MONOCYTES # BLD AUTO: 0.3 10E9/L (ref 0–1.3)
MONOCYTES NFR BLD AUTO: 8.5 %
NEUTROPHILS # BLD AUTO: 1.8 10E9/L (ref 1.6–8.3)
NEUTROPHILS NFR BLD AUTO: 52.4 %
PLATELET # BLD AUTO: 195 10E9/L (ref 150–450)
RBC # BLD AUTO: 4.2 10E12/L (ref 3.8–5.2)
WBC # BLD AUTO: 3.5 10E9/L (ref 4–11)

## 2020-08-31 PROCEDURE — 87081 CULTURE SCREEN ONLY: CPT | Performed by: NURSE PRACTITIONER

## 2020-08-31 PROCEDURE — 85025 COMPLETE CBC W/AUTO DIFF WBC: CPT | Performed by: NURSE PRACTITIONER

## 2020-08-31 PROCEDURE — 99214 OFFICE O/P EST MOD 30 MIN: CPT | Performed by: NURSE PRACTITIONER

## 2020-08-31 PROCEDURE — 36415 COLL VENOUS BLD VENIPUNCTURE: CPT | Performed by: NURSE PRACTITIONER

## 2020-08-31 PROCEDURE — 80048 BASIC METABOLIC PNL TOTAL CA: CPT | Performed by: NURSE PRACTITIONER

## 2020-08-31 ASSESSMENT — MIFFLIN-ST. JEOR: SCORE: 1462.04

## 2020-08-31 NOTE — NURSING NOTE
"Rt knee replacement.  Vital signs:  Temp: 98.2  F (36.8  C) Temp src: Oral BP: (!) 140/72 Pulse: 69   Resp: 16 SpO2: 97 %     Height: 170.2 cm (5' 7\") Weight: 93.4 kg (206 lb)  Estimated body mass index is 32.26 kg/m  as calculated from the following:    Height as of this encounter: 1.702 m (5' 7\").    Weight as of this encounter: 93.4 kg (206 lb).        "

## 2020-08-31 NOTE — TELEPHONE ENCOUNTER
Lab called to say that covid testing cant be done until 4 days prior to surgery.Patient needs to reschedule covid testing closer to her surgery date.  DANNA LOMBARDO,ARMAND

## 2020-08-31 NOTE — PATIENT INSTRUCTIONS
Go to [address] for labs    EKG done in 3/2/2020 essentially normal    Don't take any medications day of surgery.

## 2020-08-31 NOTE — PROGRESS NOTES
[place] [address]  [facility] [address]  [phone]  Dept: [phone]    PRE-OP EVALUATION:  Today's date: 2020    Justine Michelle (: 1945) presents for pre-operative evaluation assessment as requested by Dr. Jossue Novak.  She requires evaluation and anesthesia risk assessment prior to undergoing surgery/procedure for treatment of rt total knee reeplacement .    Proposed Surgery/ Procedure: total rt knee replacement  Date of Surgery/ Procedure: 2020  Time of Surgery/ Procedure: 12:30  Hospital/Surgical Facility: [facility]  Surgery Fax Number: Note does not need to be faxed, will be available electronically in Epic.  Primary Physician: Corey Ambrose  Type of Anesthesia Anticipated: General    Preoperative Questionnaire:   No - Have you ever had a heart attack or stroke?  No - Have you ever had surgery on your heart or blood vessels, such as a stent, coronary (heart) bypass, or surgery on an artery in the head, neck, heart, or legs?  No - Do you have chest pain when you are physically active?  No - Do you have a history of heart failure?  No - Do you currently have a cold, bronchitis, or symptoms of other respiratory (head and chest) infections?  No - Do you have a cough, shortness of breath, or wheezing?  No - Do you or anyone in your family have a history of blood clots?  No - Do you or anyone in your family have a serious bleeding problem, such as long-lasting bleeding after surgeries or cuts?  No - Have you ever had anemia or been told to take iron pills?  No - Have you had any abnormal blood loss such as black, tarry or bloody stools, or abnormal vaginal bleeding?  No - Have you ever had a blood transfusion?  Yes - Are you willing to have a blood transfusion if it is medically needed before, during, or after your surgery?  No - Have you or anyone in your family ever had problems with anesthesia (sedation for surgery)?  No - Do you have sleep  apnea, excessive snoring, or daytime drowsiness?   No - Do you have any artifical heart valves or other implanted medical devices, such as a pacemaker, defibrillator, or continuous glucose monitor?  YES - Do you have any artifical joints? Left hip replacement 3/17/2020  No - Are you allergic to latex?  No - Is there any chance that you may be pregnant?    Patient has a Health Care Directive or Living Will:  NO    HPI:     HPI related to upcoming procedure: Right knee pain with osteoarthritis for over 10 years and getting worse      See problem list for active medical problems.  Problems all longstanding and stable, except as noted/documented. See ROS for pertinent symptoms related to these conditions.    MEDICAL HISTORY:     Patient Active Problem List    Diagnosis Date Noted     S/P total hip arthroplasty 03/17/2020     Priority: Medium     Glaucoma suspect, bilateral 11/28/2018     Priority: Medium     Right knee pain, unspecified chronicity 05/08/2017     Priority: Medium     Fatigue, unspecified type 11/07/2016     Priority: Medium     Anemia, unspecified type 11/07/2016     Priority: Medium     RECENT EPISODE OF DIVERTICULITIS       Numerous moles 08/04/2016     Priority: Medium     Myalgia  01/07/2016     Priority: Medium     Restless leg syndrome 01/07/2016     Priority: Medium     Overweight (BMI 25.0-29.9) 01/07/2016     Priority: Medium     Adult BMI 28.0-28.9 kg/sq m 10/01/2015     Priority: Medium     Advanced directives, counseling/discussion 01/20/2015     Priority: Medium     Advance Care Planning:   ACP Review and Resources Provided:  Reviewed chart for advance care plan.  Justine Michelle has no plan or code status on file. Discussed available resources and provided with information. Confirmed code status reflects current choices pending further ACP discussions.  Confirmed/documented designated decision maker(s). See permanent comments section of demographics in clinical tab.   Added by Vicky CURTIS  Natalie on 1/20/2015             CARDIOVASCULAR SCREENING; LDL GOAL LESS THAN 160 01/20/2015     Priority: Medium     Fatigue 01/20/2015     Priority: Medium     Ascorbic acid deficiency 10/28/2014     Priority: Medium     Diagnosis updated by automated process. Provider to review and confirm.       Vitamin B12 deficiency without anemia 10/28/2014     Priority: Medium     Diagnosis updated by automated process. Provider to review and confirm.       Senile cataract 10/28/2014     Priority: Medium     Elevated ferritin level 10/28/2014     Priority: Medium     Vitamin D deficiency 10/28/2014     Priority: Medium     Palpitations 06/03/2014     Priority: Medium     Acute abdominal pain 04/16/2013     Priority: Medium     Diverticulitis of colon 04/16/2013     Priority: Medium     Myalgia and myositis 09/22/2006     Priority: Medium     Problem list name updated by automated process. Provider to review       Diverticulosis of large intestine      Priority: Medium     colonoscopy due in 2013  Problem list name updated by automated process. Provider to review       Absence of menstruation      Priority: Medium     not on HRT        Past Medical History:   Diagnosis Date     Absence of menstruation 2006    not on HRT     Acute abdominal pain 4/16/2013     Diverticulitis of colon 4/16/2013    also 2003, 2007     Diverticulosis of colon (without mention of hemorrhage)     colonoscopy due in 2013     Myalgia and myositis, unspecified      Obesity, unspecified      Other and unspecified hyperlipidemia      Pain in right knee      Palpitations      Past Surgical History:   Procedure Laterality Date     APPENDECTOMY       ARTHROPLASTY HIP Left 3/17/2020    Procedure: Left total hip arthroplasty using a Biomet G7 acetabulum and Taperloc femoral stem;  Surgeon: Bulmaro Douglass MD;  Location: [facility] OR     C NONSPECIFIC PROCEDURE  2006    nl     C NONSPECIFIC PROCEDURE  2005    nl     CHOLECYSTECTOMY, LAPOROSCOPIC       Cholecystectomy, Laparoscopic     excision of cyst right middle & little finger       [facility] COLONOSCOPY THRU STOMA, DIAGNOSTIC  2000    nl     ORTHOPEDIC SURGERY Left 1991    buionette     TUBAL LIGATION       Current Outpatient Medications   Medication Sig Dispense Refill     calcium carbonate 750 MG CHEW Take 1,500 mg by mouth daily        Cyanocobalamin (B-12 SUPER STRENGTH) 5000 MCG/ML LIQD Place 0.5 mLs under the tongue daily FOR VITAMIN B12 SUPPLEMENTATION, PLEASE PLACE UNDER THE TONGUE 2 Bottle PRN     FIBER PO Take 1 tablet by mouth daily as needed        latanoprost (XALATAN) 0.005 % ophthalmic solution Place 1 drop into both eyes daily        vitamin C (ASCORBIC ACID) 500 MG tablet Take 1,000 mg by mouth daily       vitamin D3 (CHOLECALCIFEROL) 25 mcg (93942 units) capsule Take 2 capsules by mouth every 7 days       OTC products: herbals and vitamins - B12, D 3, and vitamin C    Allergies   Allergen Reactions     Augmentin Rash     Bees      hives      Latex Allergy: NO    Social History     Tobacco Use     Smoking status: Never Smoker     Smokeless tobacco: Never Used   Substance Use Topics     Alcohol use: Yes     Comment: minimal - on holidays     History   Drug Use No       REVIEW OF SYSTEMS:   CONSTITUTIONAL: NEGATIVE for fever, chills, change in weight  INTEGUMENTARY/SKIN: NEGATIVE for worrisome rashes, moles or lesions  EYES: NEGATIVE for vision changes or irritation  ENT/MOUTH: NEGATIVE for ear, mouth and throat problems  RESP: NEGATIVE for significant cough or SOB  BREAST: NEGATIVE for masses, tenderness or discharge  CV: NEGATIVE for chest pain, palpitations or peripheral edema  GI: NEGATIVE for nausea, abdominal pain, heartburn, or change in bowel habits  : NEGATIVE for frequency, dysuria, or hematuria  MUSCULOSKELETAL: NEGATIVE for significant arthralgias or myalgia  NEURO: NEGATIVE for weakness, dizziness or paresthesias  ENDOCRINE: NEGATIVE for temperature intolerance, skin/hair changes  HEME:  "NEGATIVE for bleeding problems  PSYCHIATRIC: NEGATIVE for changes in mood or affect    EXAM:   BP (!) 140/72 (BP Location: Right arm, Patient Position: Sitting, Cuff Size: Adult Large)   Pulse 69   Temp 98.2  F (36.8  C) (Oral)   Resp 16   Ht 1.702 m (5' 7\")   Wt 93.4 kg (206 lb)   LMP  (LMP Unknown)   SpO2 97%   BMI 32.26 kg/m         GENERAL APPEARANCE: alert and no distress     EYES: EOMI, PERRL     HENT: ear canals and TM's normal and nose and mouth without ulcers or lesions     NECK: no adenopathy, no asymmetry, masses, or scars and thyroid normal to palpation     RESP: lungs clear to auscultation - no rales, rhonchi or wheezes     CV: regular rates and rhythm, normal S1 S2, no S3 or S4 and no murmur, click or rub     ABDOMEN:  soft, nontender, no HSM or masses and bowel sounds normal     MS: extremities normal except right knee as she walks with limp but no gross deformities noted, no evidence of inflammation in joints, FROM in all extremities.     SKIN: no suspicious lesions or rashes     NEURO: Normal strength and tone, sensory exam grossly normal, mentation intact and speech normal     PSYCH: mentation appears normal. and affect normal/bright     LYMPHATICS: No cervical adenopathy    DIAGNOSTICS:     EKG: appears normal, NSR, normal axis, normal intervals, no acute ST/T changes c/w ischemia, no LVH by voltage criteria, unchanged from previous tracings (from 3/2/2020)    Labs Drawn and in Process: BMP and CBC    Unresulted Labs Ordered in the Past 30 Days of this Admission     Date and Time Order Name Status Description    8/31/2020 1130 COVID-19 VIRUS (CORONAVIRUS) BY PCR In process     8/31/2020 1130 METHICILLIN RESISTANT STAPH AUREUS CULT In process           Recent Labs   Lab Test 03/20/20  0619 03/19/20  0622 03/18/20  0648 03/17/20  1911 01/06/20  1536 11/28/18  0911  05/02/14  0859   HGB  --  9.6* 11.1*  --  13.1 12.4   < >  --      --   --  209 253 199   < >  --    NA  --   --   --   " --  139 140   < > 143   POTASSIUM  --   --   --   --  3.7 3.9   < > 3.8   CR 0.85  --   --  0.68 0.74 0.81   < > 0.86   A1C  --   --   --   --   --   --   --  5.7    < > = values in this interval not displayed.        IMPRESSION:   Reason for surgery/procedure: chronic right knee pain   Diagnosis/reason for consult: Pre-op    The proposed surgical procedure is considered INTERMEDIATE risk.    REVISED CARDIAC RISK INDEX  The patient has the following serious cardiovascular risks for perioperative complications such as (MI, PE, VFib and 3  AV Block):  No serious cardiac risks  INTERPRETATION: 0 risks: Class I (very low risk - 0.4% complication rate)    The patient has the following additional risks for perioperative complications:  No identified additional risks      ICD-10-CM    1. Pre-op exam  Z01.818 Methicillin resistant staph aureus cult     Asymptomatic COVID-19 Virus (Coronavirus) by PCR     Basic metabolic panel     CBC with platelets differential   2. Chronic pain of right knee  M25.561     G89.29        RECOMMENDATIONS:         --Patient is on no chronic medications    APPROVAL GIVEN to proceed with proposed procedure, without further diagnostic evaluation       Signed Electronically by: Anjali Constantino CNP    Copy of this evaluation report is provided to requesting physician.    [facility] Preop Guidelines    Revised Cardiac Risk Index

## 2020-09-01 LAB
ANION GAP SERPL CALCULATED.3IONS-SCNC: 6 MMOL/L (ref 3–14)
BUN SERPL-MCNC: 21 MG/DL (ref 7–30)
CALCIUM SERPL-MCNC: 9.7 MG/DL (ref 8.5–10.1)
CHLORIDE SERPL-SCNC: 108 MMOL/L (ref 94–109)
CO2 SERPL-SCNC: 26 MMOL/L (ref 20–32)
CREAT SERPL-MCNC: 0.7 MG/DL (ref 0.52–1.04)
GFR SERPL CREATININE-BSD FRML MDRD: 85 ML/MIN/{1.73_M2}
GLUCOSE SERPL-MCNC: 87 MG/DL (ref 70–99)
POTASSIUM SERPL-SCNC: 3.9 MMOL/L (ref 3.4–5.3)
SODIUM SERPL-SCNC: 140 MMOL/L (ref 133–144)

## 2020-09-02 LAB
BACTERIA SPEC CULT: NORMAL
Lab: NORMAL
SPECIMEN SOURCE: NORMAL

## 2020-09-02 NOTE — TELEPHONE ENCOUNTER
Patient states she has COVID-19 test scheduled at [facility] on Saturday already as advised.

## 2020-09-02 NOTE — TELEPHONE ENCOUNTER
Per 8/31/20 encounter, patient is scheduled to have surgery on 9/8/20. 9/5/20 will be within the correct limits. Will close encounter.

## 2020-09-05 DIAGNOSIS — Z11.59 ENCOUNTER FOR SCREENING FOR OTHER VIRAL DISEASES: ICD-10-CM

## 2020-09-05 PROCEDURE — U0003 INFECTIOUS AGENT DETECTION BY NUCLEIC ACID (DNA OR RNA); SEVERE ACUTE RESPIRATORY SYNDROME CORONAVIRUS 2 (SARS-COV-2) (CORONAVIRUS DISEASE [COVID-19]), AMPLIFIED PROBE TECHNIQUE, MAKING USE OF HIGH THROUGHPUT TECHNOLOGIES AS DESCRIBED BY CMS-2020-01-R: HCPCS | Performed by: ORTHOPAEDIC SURGERY

## 2020-09-06 LAB
SARS-COV-2 RNA SPEC QL NAA+PROBE: NOT DETECTED
SPECIMEN SOURCE: NORMAL

## 2020-09-08 ENCOUNTER — HOSPITAL ENCOUNTER (OUTPATIENT)
Facility: CLINIC | Age: 75
Discharge: HOME | End: 2020-09-09
Attending: ORTHOPAEDIC SURGERY | Admitting: ORTHOPAEDIC SURGERY
Payer: COMMERCIAL

## 2020-09-08 ENCOUNTER — APPOINTMENT (OUTPATIENT)
Dept: GENERAL RADIOLOGY | Facility: CLINIC | Age: 75
End: 2020-09-08
Attending: ORTHOPAEDIC SURGERY
Payer: COMMERCIAL

## 2020-09-08 ENCOUNTER — ANESTHESIA (OUTPATIENT)
Dept: SURGERY | Facility: CLINIC | Age: 75
End: 2020-09-08
Payer: COMMERCIAL

## 2020-09-08 ENCOUNTER — ANESTHESIA EVENT (OUTPATIENT)
Dept: SURGERY | Facility: CLINIC | Age: 75
End: 2020-09-08
Payer: COMMERCIAL

## 2020-09-08 DIAGNOSIS — Z96.651 STATUS POST TOTAL RIGHT KNEE REPLACEMENT: Primary | ICD-10-CM

## 2020-09-08 PROBLEM — Z96.659 S/P TOTAL KNEE ARTHROPLASTY: Status: ACTIVE | Noted: 2020-09-08

## 2020-09-08 LAB
ABO + RH BLD: NORMAL
ABO + RH BLD: NORMAL
BLD GP AB SCN SERPL QL: NORMAL
BLOOD BANK CMNT PATIENT-IMP: NORMAL
CREAT SERPL-MCNC: 0.68 MG/DL (ref 0.52–1.04)
GFR SERPL CREATININE-BSD FRML MDRD: 86 ML/MIN/{1.73_M2}
INTERPRETATION ECG - MUSE: NORMAL
PLATELET # BLD AUTO: 177 10E9/L (ref 150–450)
SPECIMEN EXP DATE BLD: NORMAL

## 2020-09-08 PROCEDURE — 25000132 ZZH RX MED GY IP 250 OP 250 PS 637: Performed by: ORTHOPAEDIC SURGERY

## 2020-09-08 PROCEDURE — 25000125 ZZHC RX 250: Performed by: NURSE ANESTHETIST, CERTIFIED REGISTERED

## 2020-09-08 PROCEDURE — 71000012 ZZH RECOVERY PHASE 1 LEVEL 1 FIRST HR: Performed by: ORTHOPAEDIC SURGERY

## 2020-09-08 PROCEDURE — 36415 COLL VENOUS BLD VENIPUNCTURE: CPT | Performed by: ANESTHESIOLOGY

## 2020-09-08 PROCEDURE — 86900 BLOOD TYPING SEROLOGIC ABO: CPT | Performed by: ANESTHESIOLOGY

## 2020-09-08 PROCEDURE — 25000128 H RX IP 250 OP 636: Performed by: ANESTHESIOLOGY

## 2020-09-08 PROCEDURE — 40000306 ZZH STATISTIC PRE PROC ASSESS II: Performed by: ORTHOPAEDIC SURGERY

## 2020-09-08 PROCEDURE — 25800025 ZZH RX 258: Performed by: ORTHOPAEDIC SURGERY

## 2020-09-08 PROCEDURE — 85049 AUTOMATED PLATELET COUNT: CPT | Performed by: ORTHOPAEDIC SURGERY

## 2020-09-08 PROCEDURE — 93010 ELECTROCARDIOGRAM REPORT: CPT | Performed by: INTERNAL MEDICINE

## 2020-09-08 PROCEDURE — 25000128 H RX IP 250 OP 636: Performed by: NURSE ANESTHETIST, CERTIFIED REGISTERED

## 2020-09-08 PROCEDURE — 25000128 H RX IP 250 OP 636: Performed by: PHYSICIAN ASSISTANT

## 2020-09-08 PROCEDURE — 25000132 ZZH RX MED GY IP 250 OP 250 PS 637: Performed by: PHYSICIAN ASSISTANT

## 2020-09-08 PROCEDURE — C1713 ANCHOR/SCREW BN/BN,TIS/BN: HCPCS | Performed by: ORTHOPAEDIC SURGERY

## 2020-09-08 PROCEDURE — 37000008 ZZH ANESTHESIA TECHNICAL FEE, 1ST 30 MIN: Performed by: ORTHOPAEDIC SURGERY

## 2020-09-08 PROCEDURE — 27211024 ZZHC OR SUPPLY OTHER OPNP: Performed by: ORTHOPAEDIC SURGERY

## 2020-09-08 PROCEDURE — 25800030 ZZH RX IP 258 OP 636: Performed by: ORTHOPAEDIC SURGERY

## 2020-09-08 PROCEDURE — 37000009 ZZH ANESTHESIA TECHNICAL FEE, EACH ADDTL 15 MIN: Performed by: ORTHOPAEDIC SURGERY

## 2020-09-08 PROCEDURE — 27810169 ZZH OR IMPLANT GENERAL: Performed by: ORTHOPAEDIC SURGERY

## 2020-09-08 PROCEDURE — C1776 JOINT DEVICE (IMPLANTABLE): HCPCS | Performed by: ORTHOPAEDIC SURGERY

## 2020-09-08 PROCEDURE — 40000986 XR KNEE PORT RT 1/2 VW: Mod: RT

## 2020-09-08 PROCEDURE — 36415 COLL VENOUS BLD VENIPUNCTURE: CPT | Performed by: ORTHOPAEDIC SURGERY

## 2020-09-08 PROCEDURE — 25800030 ZZH RX IP 258 OP 636: Performed by: ANESTHESIOLOGY

## 2020-09-08 PROCEDURE — 86850 RBC ANTIBODY SCREEN: CPT | Performed by: ANESTHESIOLOGY

## 2020-09-08 PROCEDURE — 82565 ASSAY OF CREATININE: CPT | Performed by: ORTHOPAEDIC SURGERY

## 2020-09-08 PROCEDURE — 86901 BLOOD TYPING SEROLOGIC RH(D): CPT | Performed by: ANESTHESIOLOGY

## 2020-09-08 PROCEDURE — 71000013 ZZH RECOVERY PHASE 1 LEVEL 1 EA ADDTL HR: Performed by: ORTHOPAEDIC SURGERY

## 2020-09-08 PROCEDURE — 25000128 H RX IP 250 OP 636: Performed by: ORTHOPAEDIC SURGERY

## 2020-09-08 PROCEDURE — 36000093 ZZH SURGERY LEVEL 4 1ST 30 MIN: Performed by: ORTHOPAEDIC SURGERY

## 2020-09-08 PROCEDURE — 36000063 ZZH SURGERY LEVEL 4 EA 15 ADDTL MIN: Performed by: ORTHOPAEDIC SURGERY

## 2020-09-08 PROCEDURE — 27210794 ZZH OR GENERAL SUPPLY STERILE: Performed by: ORTHOPAEDIC SURGERY

## 2020-09-08 PROCEDURE — 25000125 ZZHC RX 250: Performed by: ANESTHESIOLOGY

## 2020-09-08 DEVICE — BONE CEMENT SIMPLEX FULL DOSE 6191-1-001: Type: IMPLANTABLE DEVICE | Site: KNEE | Status: FUNCTIONAL

## 2020-09-08 DEVICE — IMP PLATE TIBIAL ZIM NEXGEN SIZE 6  00-5980-047-02: Type: IMPLANTABLE DEVICE | Site: KNEE | Status: FUNCTIONAL

## 2020-09-08 DEVICE — IMP COMP PATELLA ZIM NEXGEN 9.0X35MM: Type: IMPLANTABLE DEVICE | Site: KNEE | Status: FUNCTIONAL

## 2020-09-08 DEVICE — IMP COMP FEMORAL E RT 00-5764-015-52: Type: IMPLANTABLE DEVICE | Site: KNEE | Status: FUNCTIONAL

## 2020-09-08 DEVICE — IMP ART SURFACE ZIM NEXGEN LPS EF 5-6 12MM 00-5964-040-12: Type: IMPLANTABLE DEVICE | Site: KNEE | Status: FUNCTIONAL

## 2020-09-08 RX ORDER — TRANEXAMIC ACID 650 MG/1
1950 TABLET ORAL ONCE
Status: COMPLETED | OUTPATIENT
Start: 2020-09-08 | End: 2020-09-08

## 2020-09-08 RX ORDER — SODIUM CHLORIDE, SODIUM LACTATE, POTASSIUM CHLORIDE, CALCIUM CHLORIDE 600; 310; 30; 20 MG/100ML; MG/100ML; MG/100ML; MG/100ML
INJECTION, SOLUTION INTRAVENOUS CONTINUOUS
Status: DISCONTINUED | OUTPATIENT
Start: 2020-09-08 | End: 2020-09-08 | Stop reason: HOSPADM

## 2020-09-08 RX ORDER — ONDANSETRON 4 MG/1
4-8 TABLET, ORALLY DISINTEGRATING ORAL EVERY 8 HOURS PRN
Qty: 4 TABLET | Refills: 0 | Status: SHIPPED | OUTPATIENT
Start: 2020-09-08 | End: 2021-02-08

## 2020-09-08 RX ORDER — FENTANYL CITRATE 50 UG/ML
25-50 INJECTION, SOLUTION INTRAMUSCULAR; INTRAVENOUS
Status: DISCONTINUED | OUTPATIENT
Start: 2020-09-08 | End: 2020-09-08 | Stop reason: HOSPADM

## 2020-09-08 RX ORDER — IBUPROFEN 600 MG/1
600 TABLET, FILM COATED ORAL EVERY 6 HOURS PRN
Status: DISCONTINUED | OUTPATIENT
Start: 2020-09-08 | End: 2020-09-09 | Stop reason: HOSPADM

## 2020-09-08 RX ORDER — HYDROXYZINE HYDROCHLORIDE 25 MG/1
25 TABLET, FILM COATED ORAL EVERY 6 HOURS PRN
Qty: 30 TABLET | Refills: 0 | Status: SHIPPED | OUTPATIENT
Start: 2020-09-08 | End: 2021-02-08

## 2020-09-08 RX ORDER — OXYCODONE AND ACETAMINOPHEN 5; 325 MG/1; MG/1
1-2 TABLET ORAL EVERY 4 HOURS PRN
Qty: 60 TABLET | Refills: 0 | Status: SHIPPED | OUTPATIENT
Start: 2020-09-08 | End: 2021-02-08

## 2020-09-08 RX ORDER — ROPIVACAINE HYDROCHLORIDE 7.5 MG/ML
INJECTION, SOLUTION EPIDURAL; PERINEURAL PRN
Status: DISCONTINUED | OUTPATIENT
Start: 2020-09-08 | End: 2020-09-08

## 2020-09-08 RX ORDER — ONDANSETRON 4 MG/1
4 TABLET, ORALLY DISINTEGRATING ORAL EVERY 30 MIN PRN
Status: DISCONTINUED | OUTPATIENT
Start: 2020-09-08 | End: 2020-09-08 | Stop reason: HOSPADM

## 2020-09-08 RX ORDER — LIDOCAINE 40 MG/G
CREAM TOPICAL
Status: DISCONTINUED | OUTPATIENT
Start: 2020-09-08 | End: 2020-09-08 | Stop reason: HOSPADM

## 2020-09-08 RX ORDER — CEFAZOLIN SODIUM 2 G/100ML
2 INJECTION, SOLUTION INTRAVENOUS EVERY 8 HOURS
Status: COMPLETED | OUTPATIENT
Start: 2020-09-08 | End: 2020-09-09

## 2020-09-08 RX ORDER — ONDANSETRON 2 MG/ML
4 INJECTION INTRAMUSCULAR; INTRAVENOUS EVERY 6 HOURS
Status: DISCONTINUED | OUTPATIENT
Start: 2020-09-08 | End: 2020-09-09 | Stop reason: HOSPADM

## 2020-09-08 RX ORDER — LATANOPROST 50 UG/ML
1 SOLUTION/ DROPS OPHTHALMIC EVERY EVENING
Status: DISCONTINUED | OUTPATIENT
Start: 2020-09-08 | End: 2020-09-09 | Stop reason: HOSPADM

## 2020-09-08 RX ORDER — ONDANSETRON 2 MG/ML
4 INJECTION INTRAMUSCULAR; INTRAVENOUS EVERY 6 HOURS PRN
Status: DISCONTINUED | OUTPATIENT
Start: 2020-09-08 | End: 2020-09-09 | Stop reason: HOSPADM

## 2020-09-08 RX ORDER — GLYCINE 1.5 G/100ML
SOLUTION IRRIGATION PRN
Status: DISCONTINUED | OUTPATIENT
Start: 2020-09-08 | End: 2020-09-08 | Stop reason: HOSPADM

## 2020-09-08 RX ORDER — CALCIUM CARBONATE 500 MG/1
1000 TABLET, CHEWABLE ORAL 4 TIMES DAILY PRN
Status: DISCONTINUED | OUTPATIENT
Start: 2020-09-08 | End: 2020-09-09 | Stop reason: HOSPADM

## 2020-09-08 RX ORDER — PROCHLORPERAZINE MALEATE 5 MG
5 TABLET ORAL EVERY 6 HOURS PRN
Status: DISCONTINUED | OUTPATIENT
Start: 2020-09-08 | End: 2020-09-09 | Stop reason: HOSPADM

## 2020-09-08 RX ORDER — OXYCODONE HYDROCHLORIDE 5 MG/1
5-10 TABLET ORAL
Status: DISCONTINUED | OUTPATIENT
Start: 2020-09-08 | End: 2020-09-09 | Stop reason: HOSPADM

## 2020-09-08 RX ORDER — HYDROXYZINE HYDROCHLORIDE 25 MG/1
25 TABLET, FILM COATED ORAL EVERY 6 HOURS PRN
Status: DISCONTINUED | OUTPATIENT
Start: 2020-09-08 | End: 2020-09-09 | Stop reason: HOSPADM

## 2020-09-08 RX ORDER — HYDROMORPHONE HYDROCHLORIDE 1 MG/ML
.3-.5 INJECTION, SOLUTION INTRAMUSCULAR; INTRAVENOUS; SUBCUTANEOUS EVERY 5 MIN PRN
Status: DISCONTINUED | OUTPATIENT
Start: 2020-09-08 | End: 2020-09-08 | Stop reason: HOSPADM

## 2020-09-08 RX ORDER — ONDANSETRON 4 MG/1
4 TABLET, ORALLY DISINTEGRATING ORAL EVERY 6 HOURS PRN
Status: DISCONTINUED | OUTPATIENT
Start: 2020-09-08 | End: 2020-09-09 | Stop reason: HOSPADM

## 2020-09-08 RX ORDER — NALOXONE HYDROCHLORIDE 0.4 MG/ML
.1-.4 INJECTION, SOLUTION INTRAMUSCULAR; INTRAVENOUS; SUBCUTANEOUS
Status: DISCONTINUED | OUTPATIENT
Start: 2020-09-08 | End: 2020-09-09 | Stop reason: HOSPADM

## 2020-09-08 RX ORDER — HYDROMORPHONE HYDROCHLORIDE 1 MG/ML
0.2 INJECTION, SOLUTION INTRAMUSCULAR; INTRAVENOUS; SUBCUTANEOUS
Status: DISCONTINUED | OUTPATIENT
Start: 2020-09-08 | End: 2020-09-09 | Stop reason: HOSPADM

## 2020-09-08 RX ORDER — AMOXICILLIN 250 MG
1-2 CAPSULE ORAL 2 TIMES DAILY
Qty: 30 TABLET | Refills: 0 | Status: SHIPPED | OUTPATIENT
Start: 2020-09-08 | End: 2021-02-08

## 2020-09-08 RX ORDER — CELECOXIB 200 MG/1
400 CAPSULE ORAL ONCE
Status: COMPLETED | OUTPATIENT
Start: 2020-09-08 | End: 2020-09-08

## 2020-09-08 RX ORDER — FENTANYL CITRATE 50 UG/ML
INJECTION, SOLUTION INTRAMUSCULAR; INTRAVENOUS PRN
Status: DISCONTINUED | OUTPATIENT
Start: 2020-09-08 | End: 2020-09-08

## 2020-09-08 RX ORDER — LIDOCAINE 40 MG/G
CREAM TOPICAL
Status: DISCONTINUED | OUTPATIENT
Start: 2020-09-08 | End: 2020-09-08

## 2020-09-08 RX ORDER — SODIUM CHLORIDE 9 MG/ML
INJECTION, SOLUTION INTRAVENOUS CONTINUOUS
Status: DISCONTINUED | OUTPATIENT
Start: 2020-09-08 | End: 2020-09-09

## 2020-09-08 RX ORDER — HYDROXYZINE HYDROCHLORIDE 25 MG/1
25 TABLET, FILM COATED ORAL 3 TIMES DAILY PRN
Status: DISCONTINUED | OUTPATIENT
Start: 2020-09-08 | End: 2020-09-08

## 2020-09-08 RX ORDER — CEFAZOLIN SODIUM 1 G/3ML
1 INJECTION, POWDER, FOR SOLUTION INTRAMUSCULAR; INTRAVENOUS SEE ADMIN INSTRUCTIONS
Status: DISCONTINUED | OUTPATIENT
Start: 2020-09-08 | End: 2020-09-08 | Stop reason: HOSPADM

## 2020-09-08 RX ORDER — DEXAMETHASONE SODIUM PHOSPHATE 4 MG/ML
INJECTION, SOLUTION INTRA-ARTICULAR; INTRALESIONAL; INTRAMUSCULAR; INTRAVENOUS; SOFT TISSUE PRN
Status: DISCONTINUED | OUTPATIENT
Start: 2020-09-08 | End: 2020-09-08

## 2020-09-08 RX ORDER — ACETAMINOPHEN 325 MG/1
975 TABLET ORAL EVERY 8 HOURS
Status: DISCONTINUED | OUTPATIENT
Start: 2020-09-08 | End: 2020-09-09 | Stop reason: HOSPADM

## 2020-09-08 RX ORDER — NEOSTIGMINE METHYLSULFATE 1 MG/ML
VIAL (ML) INJECTION PRN
Status: DISCONTINUED | OUTPATIENT
Start: 2020-09-08 | End: 2020-09-08

## 2020-09-08 RX ORDER — PROPOFOL 10 MG/ML
INJECTION, EMULSION INTRAVENOUS PRN
Status: DISCONTINUED | OUTPATIENT
Start: 2020-09-08 | End: 2020-09-08

## 2020-09-08 RX ORDER — ONDANSETRON 2 MG/ML
4 INJECTION INTRAMUSCULAR; INTRAVENOUS EVERY 30 MIN PRN
Status: DISCONTINUED | OUTPATIENT
Start: 2020-09-08 | End: 2020-09-08 | Stop reason: HOSPADM

## 2020-09-08 RX ORDER — LIDOCAINE HCL/EPINEPHRINE/PF 2%-1:200K
VIAL (ML) INJECTION PRN
Status: DISCONTINUED | OUTPATIENT
Start: 2020-09-08 | End: 2020-09-08

## 2020-09-08 RX ORDER — GLYCOPYRROLATE 0.2 MG/ML
INJECTION, SOLUTION INTRAMUSCULAR; INTRAVENOUS PRN
Status: DISCONTINUED | OUTPATIENT
Start: 2020-09-08 | End: 2020-09-08

## 2020-09-08 RX ORDER — CEFAZOLIN SODIUM 2 G/100ML
2 INJECTION, SOLUTION INTRAVENOUS
Status: COMPLETED | OUTPATIENT
Start: 2020-09-08 | End: 2020-09-08

## 2020-09-08 RX ADMIN — TRANEXAMIC ACID 1950 MG: 650 TABLET ORAL at 11:00

## 2020-09-08 RX ADMIN — MIDAZOLAM 2 MG: 1 INJECTION INTRAMUSCULAR; INTRAVENOUS at 12:44

## 2020-09-08 RX ADMIN — FENTANYL CITRATE 50 MCG: 50 INJECTION, SOLUTION INTRAMUSCULAR; INTRAVENOUS at 15:43

## 2020-09-08 RX ADMIN — OXYCODONE HYDROCHLORIDE 5 MG: 5 TABLET ORAL at 20:49

## 2020-09-08 RX ADMIN — ROCURONIUM BROMIDE 40 MG: 10 INJECTION INTRAVENOUS at 13:20

## 2020-09-08 RX ADMIN — ROPIVACAINE HYDROCHLORIDE 20 ML: 7.5 INJECTION, SOLUTION EPIDURAL; PERINEURAL at 12:43

## 2020-09-08 RX ADMIN — LIDOCAINE HYDROCHLORIDE,EPINEPHRINE BITARTRATE 10 ML: 20; .005 INJECTION, SOLUTION EPIDURAL; INFILTRATION; INTRACAUDAL; PERINEURAL at 12:43

## 2020-09-08 RX ADMIN — CEFAZOLIN SODIUM 2 G: 2 INJECTION, SOLUTION INTRAVENOUS at 13:24

## 2020-09-08 RX ADMIN — CALCIUM CARBONATE (ANTACID) CHEW TAB 500 MG 1000 MG: 500 CHEW TAB at 20:49

## 2020-09-08 RX ADMIN — HYDROXYZINE HYDROCHLORIDE 25 MG: 25 TABLET, FILM COATED ORAL at 17:45

## 2020-09-08 RX ADMIN — GLYCOPYRROLATE 0.8 MG: 0.2 INJECTION, SOLUTION INTRAMUSCULAR; INTRAVENOUS at 15:04

## 2020-09-08 RX ADMIN — SODIUM CHLORIDE, POTASSIUM CHLORIDE, SODIUM LACTATE AND CALCIUM CHLORIDE: 600; 310; 30; 20 INJECTION, SOLUTION INTRAVENOUS at 14:24

## 2020-09-08 RX ADMIN — ACETAMINOPHEN 975 MG: 325 TABLET, FILM COATED ORAL at 17:45

## 2020-09-08 RX ADMIN — SODIUM CHLORIDE: 9 INJECTION, SOLUTION INTRAVENOUS at 20:58

## 2020-09-08 RX ADMIN — CEFAZOLIN SODIUM 2 G: 2 INJECTION, SOLUTION INTRAVENOUS at 21:00

## 2020-09-08 RX ADMIN — FENTANYL CITRATE 150 MCG: 50 INJECTION, SOLUTION INTRAMUSCULAR; INTRAVENOUS at 13:20

## 2020-09-08 RX ADMIN — Medication 4 MG: at 15:05

## 2020-09-08 RX ADMIN — FENTANYL CITRATE 50 MCG: 50 INJECTION, SOLUTION INTRAMUSCULAR; INTRAVENOUS at 16:00

## 2020-09-08 RX ADMIN — SODIUM CHLORIDE 1000 ML: 9 INJECTION, SOLUTION INTRAVENOUS at 17:45

## 2020-09-08 RX ADMIN — HYDROMORPHONE HYDROCHLORIDE 1 MG: 1 INJECTION, SOLUTION INTRAMUSCULAR; INTRAVENOUS; SUBCUTANEOUS at 13:47

## 2020-09-08 RX ADMIN — DEXAMETHASONE SODIUM PHOSPHATE 4 MG: 4 INJECTION, SOLUTION INTRA-ARTICULAR; INTRALESIONAL; INTRAMUSCULAR; INTRAVENOUS; SOFT TISSUE at 13:26

## 2020-09-08 RX ADMIN — LIDOCAINE HYDROCHLORIDE 50 MG: 10 INJECTION, SOLUTION EPIDURAL; INFILTRATION; INTRACAUDAL; PERINEURAL at 13:20

## 2020-09-08 RX ADMIN — FENTANYL CITRATE 100 MCG: 50 INJECTION, SOLUTION INTRAMUSCULAR; INTRAVENOUS at 14:35

## 2020-09-08 RX ADMIN — CELECOXIB 400 MG: 200 CAPSULE ORAL at 11:00

## 2020-09-08 RX ADMIN — FENTANYL CITRATE 100 MCG: 50 INJECTION, SOLUTION INTRAMUSCULAR; INTRAVENOUS at 13:39

## 2020-09-08 RX ADMIN — PROPOFOL 200 MG: 10 INJECTION, EMULSION INTRAVENOUS at 13:20

## 2020-09-08 RX ADMIN — SODIUM CHLORIDE, POTASSIUM CHLORIDE, SODIUM LACTATE AND CALCIUM CHLORIDE: 600; 310; 30; 20 INJECTION, SOLUTION INTRAVENOUS at 12:30

## 2020-09-08 ASSESSMENT — ENCOUNTER SYMPTOMS: SEIZURES: 0

## 2020-09-08 ASSESSMENT — ACTIVITIES OF DAILY LIVING (ADL)
RETIRED_EATING: 0-->INDEPENDENT
COGNITION: 0 - NO COGNITION ISSUES REPORTED
BATHING: 0-->INDEPENDENT
AMBULATION: 1-->ASSISTIVE EQUIPMENT
WHICH_OF_THE_ABOVE_FUNCTIONAL_RISKS_HAD_A_RECENT_ONSET_OR_CHANGE?: AMBULATION
SWALLOWING: 0-->SWALLOWS FOODS/LIQUIDS WITHOUT DIFFICULTY
DRESS: 0-->INDEPENDENT
FALL_HISTORY_WITHIN_LAST_SIX_MONTHS: NO
RETIRED_COMMUNICATION: 0-->UNDERSTANDS/COMMUNICATES WITHOUT DIFFICULTY
TOILETING: 0-->INDEPENDENT
TRANSFERRING: 0-->INDEPENDENT

## 2020-09-08 ASSESSMENT — MIFFLIN-ST. JEOR: SCORE: 1454.33

## 2020-09-08 NOTE — ANESTHESIA PROCEDURE NOTES
Procedure note : Saphenous  Staff -   Anesthesiologist:  Osei Preston MD      Performed By: anesthesiologist    Referred By: MARY KAY Douglass    Pre-Procedure  Performed by Osei Preston MD  Referred by MARY KAY Douglass  Location: pre-op      Pre-Anesthestic Checklist: patient identified, IV checked, site marked, risks and benefits discussed, informed consent, monitors and equipment checked, pre-op evaluation, at physician/surgeon's request and post-op pain management    Timeout  Correct Patient: Yes   Correct Procedure: Yes   Correct Site: Yes   Correct Laterality: Yes   Correct Position: Yes   Site Marked: Yes   .   Procedure Documentation    .    Procedure: Saphenous, right.   Patient Position:supine   Ultrasound used to identify targeted nerve, plexus, or vascular marker and placed a needle adjacent to it., Ultrasound was used to visualize the spread of the anesthetic in close proximity to the above stated nerve.   Patient Prep/Sterile Barriers; mask, sterile gloves, povidone-iodine 7.5% surgical scrub.  .  Needle: insulated, short bevel   Needle Gauge: 22.    Needle Length (Inches) 4   Insertion Method: Single Shot.        Assessment/Narrative  Paresthesias: No.  Injection made incrementally with aspirations every 3 mL..  The placement was negative for: blood aspirated, painful injection and site bleeding.  Bolus given via needle..   Secured via.   Complications: none. Test dose of mL at. Test dose negative for signs of intravascular, subdural or intrathecal injection. Comments:  The surgeon has given a verbal order transferring care of this patient to me for the performance of a regional analgesia block for post-op pain control. It is requested of me because I am uniquely trained and qualified to perform this block and the surgeon is neither trained nor qualified to perform this procedure.

## 2020-09-08 NOTE — BRIEF OP NOTE
[facility]    Brief Operative Note    Pre-operative diagnosis: DJD (degenerative joint disease) [M19.90]  Post-operative diagnosis Same as pre-operative diagnosis    Procedure: Procedure(s):  Right total knee arthroplasty  Surgeon: Surgeon(s) and Role:     * Bulmaro Douglass MD - Primary     * Marni Coe PA-C - Assisting  Anesthesia: Choice   Estimated blood loss: Minimal  Drains: None  Specimens:   ID Type Source Tests Collected by Time Destination   1 : Right Knee Bone and Tissue Tissue Knee, Right OR DOCUMENTATION ONLY Bulmaro Douglass MD 9/8/2020  1:51 PM      Findings:   None.  Complications: None.  Implants:   Implant Name Type Inv. Item Serial No.  Lot No. LRB No. Used Action   BONE CEMENT SIMPLEX FULL DOSE 6191-1-001 Cement, Bone BONE CEMENT SIMPLEX FULL DOSE 6191-1-001  [employer] [id number] Right 1 Implanted   IMP PLATE TIBIAL ZIM NEXGEN SIZE 6  -380-02 Metallic Hardware/Dwight IMP PLATE TIBIAL ZIM NEXGEN SIZE 6  -684-02  [employer] [id number] Right 1 Implanted   IMP COMP FEMORAL E RT -193-52 Total Joint Component/Insert IMP COMP FEMORAL E RT -139-52  [employer] [id number] Right 1 Implanted   IMP COMP PATELLA ZIM NEXGEN 9.0X35MM Total Joint Component/Insert IMP COMP PATELLA ZIM NEXGEN 9.0X35MM  [employer] [id number] Right 1 Implanted   IMP ART SURFACE ZIM NEXGEN LPS EF 5-6 12MM -279-12 Total Joint Component/Insert IMP ART SURFACE ZIM NEXGEN LPS EF 5-6 12MM -744-12  [employer] [id number] Right 1 Implanted

## 2020-09-08 NOTE — PHARMACY-ADMISSION MEDICATION HISTORY
Medication history and patient interview completed by pre-admitting RN.  Reviewed by pharmacist, including [employer] dispense records, [facility] Everywhere, and chart review.       Luciano Yung, Pharm.D.    Med List Status: Nurse Complete Set By: Sylvia Galvin, RN at 08/20/2020 2:35 PM     Prior to Admission medications    Medication Sig Last Dose Taking? Auth Provider   calcium carbonate 750 MG CHEW Take 1,500 mg by mouth daily  9/7/2020 at 1900 Yes Unknown, Entered By History   Cyanocobalamin (B-12 SUPER STRENGTH) 5000 MCG/ML LIQD Place 0.5 mLs under the tongue daily FOR VITAMIN B12 SUPPLEMENTATION, PLEASE PLACE UNDER THE TONGUE 9/7/2020 at 1900 Yes Adriana Hernandez MD   FIBER PO Take 1 tablet by mouth daily as needed  9/7/2020 at 1900 Yes Reported, Patient   latanoprost (XALATAN) 0.005 % ophthalmic solution Place 1 drop into both eyes daily  9/7/2020 at 1900 Yes Reported, Patient   vitamin C (ASCORBIC ACID) 500 MG tablet Take 1,000 mg by mouth daily 9/7/2020 at 1900 Yes Reported, Patient   vitamin D3 (CHOLECALCIFEROL) 25 mcg (34891 units) capsule Take 2 capsules by mouth every 7 days 9/7/2020 at 1900 Yes Reported, Patient

## 2020-09-08 NOTE — ANESTHESIA PREPROCEDURE EVALUATION
Anesthesia Pre-Procedure Evaluation    Patient: Justine Michelle   MRN: [id number] : 1945          Preoperative Diagnosis: DJD (degenerative joint disease) [M19.90]    Procedure(s):  Right total knee arthroplasty    Past Medical History:   Diagnosis Date     Absence of menstruation     not on HRT     Acute abdominal pain 2013     Diverticulitis of colon 2013    also ,      Diverticulosis of colon (without mention of hemorrhage)     colonoscopy due in      Myalgia and myositis, unspecified      Obesity, unspecified      Other and unspecified hyperlipidemia      Pain in right knee      Palpitations      Past Surgical History:   Procedure Laterality Date     APPENDECTOMY       ARTHROPLASTY HIP Left 3/17/2020    Procedure: Left total hip arthroplasty using a Biomet G7 acetabulum and Taperloc femoral stem;  Surgeon: Bulmaro Douglass MD;  Location: [facility] OR     C NONSPECIFIC PROCEDURE      nl     C NONSPECIFIC PROCEDURE      nl     CHOLECYSTECTOMY, LAPOROSCOPIC      Cholecystectomy, Laparoscopic     excision of cyst right middle & little finger       HC COLONOSCOPY THRU STOMA, DIAGNOSTIC      nl     ORTHOPEDIC SURGERY Left     buionette     TUBAL LIGATION       Anesthesia Evaluation     .             ROS/MED HX    ENT/Pulmonary:     (+)NIGHAT risk factors , . .   (-) sleep apnea   Neurologic:      (-) seizures, Delerium, Dementia, Neuropathy and migraines   Cardiovascular:     (+) ----. : . . . :. Irregular Heartbeat/Palpitations, .      (-) hypertension, CAD, pulmonary hypertension and dyslipidemia   METS/Exercise Tolerance:     Hematologic:        (-) anemia   Musculoskeletal:   (+) arthritis,  -       GI/Hepatic:        (-) GERD   Renal/Genitourinary:      (-) renal disease   Endo:     (+) Obesity, .   (-) Type I DM, Type II DM, thyroid disease and chronic steroid usage   Psychiatric:        (-) psychiatric history   Infectious Disease:  - neg infectious disease  ROS       Malignancy:      - no malignancy   Other:    - neg other ROS                      Physical Exam      Airway   Mallampati: II  TM distance: >3 FB  Neck ROM: full    Dental     Cardiovascular   Rhythm and rate: regular and normal  (-) no murmur    Pulmonary    breath sounds clear to auscultation    Other findings: Lab Test        08/31/20 03/20/20 03/19/20 03/18/20 03/17/20 01/06/20 11/28/18                       1145          0619          0622          0648          1911          1536          0911          WBC          3.5*          --           --           --           --          4.7          6.0           HGB          12.8          --          9.6*         11.1*         --          13.1         12.4          MCV          94            --           --           --           --          93           96            PLT          195          171           --           --          209          253          199            Lab Test        08/31/20 03/20/20 03/19/20 03/18/20 03/17/20 01/06/20 11/28/18                       1145          0619          0622          0648          1911          1536          0911          NA           140           --           --           --           --          139          140           POTASSIUM    3.9           --           --           --           --          3.7          3.9           CHLORIDE     108           --           --           --           --          106          106           CO2          26            --           --           --           --          25           25            BUN          21            --           --           --           --          15           16            CR           0.70         0.85          --           --          0.68         0.74         0.81          ANIONGAP     6             --           --           --           --          8            9             NIKKY          9.7         "   --           --           --           --          9.6          9.8           GLC          87            --          102*         119*          --          92           99                  Lab Results   Component Value Date    WBC 3.5 (L) 08/31/2020    HGB 12.8 08/31/2020    HCT 39.6 08/31/2020     08/31/2020    SED 25 01/06/2020     08/31/2020    POTASSIUM 3.9 08/31/2020    CHLORIDE 108 08/31/2020    CO2 26 08/31/2020    BUN 21 08/31/2020    CR 0.70 08/31/2020    GLC 87 08/31/2020    NIKKY 9.7 08/31/2020    MAG 1.9 10/23/2014    ALBUMIN 3.8 01/06/2020    PROTTOTAL 7.5 01/06/2020    ALT 20 01/06/2020    AST 15 01/06/2020    ALKPHOS 76 01/06/2020    BILITOTAL 0.6 01/06/2020    LIPASE 63 04/16/2013    AMYLASE 43 04/16/2013    TSH 1.35 01/06/2020    T4 0.91 05/02/2016       Preop Vitals  BP Readings from Last 3 Encounters:   09/08/20 (!) 144/85   08/31/20 (!) 140/72   03/20/20 112/56    Pulse Readings from Last 3 Encounters:   08/31/20 69   03/20/20 78   03/02/20 82      Resp Readings from Last 3 Encounters:   09/08/20 18   08/31/20 16   03/20/20 16    SpO2 Readings from Last 3 Encounters:   09/08/20 98%   08/31/20 97%   03/20/20 95%      Temp Readings from Last 1 Encounters:   09/08/20 98.7  F (37.1  C) (Temporal)    Ht Readings from Last 1 Encounters:   09/08/20 1.702 m (5' 7\")      Wt Readings from Last 1 Encounters:   09/08/20 92.7 kg (204 lb 4.8 oz)    Estimated body mass index is 32 kg/m  as calculated from the following:    Height as of this encounter: 1.702 m (5' 7\").    Weight as of this encounter: 92.7 kg (204 lb 4.8 oz).       Anesthesia Plan      History & Physical Review  History and physical reviewed and following examination; no interval change.    ASA Status:  2 .    NPO Status:  > 8 hours    Plan for General with Propofol induction. Maintenance will be Balanced.    PONV prophylaxis:  Ondansetron (or other 5HT-3)  Additional equipment: Videolaryngoscope SAB refused    Glidescope required " last surgery        Postoperative Care  Postoperative pain management:  IV analgesics, Oral pain medications and Peripheral nerve block (Single Shot).      Consents  Anesthetic plan, risks, benefits and alternatives discussed with:  Patient..                 Osei Preston MD                    .

## 2020-09-08 NOTE — ANESTHESIA POSTPROCEDURE EVALUATION
Patient: Justine Michelle    Procedure(s):  Right total knee arthroplasty    Diagnosis:DJD (degenerative joint disease) [M19.90]  Diagnosis Additional Information: No value filed.    Anesthesia Type:  General    Note:  Anesthesia Post Evaluation    Patient location during evaluation: PACU  Patient participation: Able to fully participate in evaluation  Level of consciousness: awake  Pain management: adequate  Airway patency: patent  Cardiovascular status: acceptable  Respiratory status: acceptable  Hydration status: euvolemic  PONV: controlled     Anesthetic complications: None          Last vitals:  Vitals:    09/08/20 1605 09/08/20 1610 09/08/20 1615   BP: 138/73     Pulse: 66 63 68   Resp: 18 17 22   Temp:      SpO2: 90% 91% 95%         Electronically Signed By: Osei Preston MD  September 8, 2020  4:24 PM

## 2020-09-08 NOTE — ANESTHESIA CARE TRANSFER NOTE
Patient: Justine Michelle    Procedure(s):  Right total knee arthroplasty    Diagnosis: DJD (degenerative joint disease) [M19.90]  Diagnosis Additional Information: No value filed.    Anesthesia Type:   General     Note:  Airway :Face Mask  Patient transferred to:PACU  Comments:   Spontaneous respirations, oral suctioned, bilateral eye opening and hand grasps.  Extubated to FM O2 6lpm.  VSS to PACU.Handoff Report: Identifed the Patient, Identified the Reponsible Provider, Reviewed the pertinent medical history, Discussed the surgical course, Reviewed Intra-OP anesthesia mangement and issues during anesthesia, Set expectations for post-procedure period and Allowed opportunity for questions and acknowledgement of understanding      Vitals: (Last set prior to Anesthesia Care Transfer)    CRNA VITALS  9/8/2020 1456 - 9/8/2020 1545      9/8/2020             NIBP:  165/89    Pulse:  76    SpO2:  99 %    EKG:  Sinus rhythm                Electronically Signed By: CARMEN Horta CRNA  September 8, 2020  3:45 PM

## 2020-09-09 ENCOUNTER — APPOINTMENT (OUTPATIENT)
Dept: PHYSICAL THERAPY | Facility: CLINIC | Age: 75
End: 2020-09-09
Attending: ORTHOPAEDIC SURGERY
Payer: COMMERCIAL

## 2020-09-09 VITALS
SYSTOLIC BLOOD PRESSURE: 126 MMHG | DIASTOLIC BLOOD PRESSURE: 64 MMHG | OXYGEN SATURATION: 96 % | WEIGHT: 204.3 LBS | TEMPERATURE: 98.9 F | RESPIRATION RATE: 16 BRPM | HEIGHT: 67 IN | BODY MASS INDEX: 32.07 KG/M2 | HEART RATE: 63 BPM

## 2020-09-09 LAB
GLUCOSE SERPL-MCNC: 109 MG/DL (ref 70–99)
HGB BLD-MCNC: 10.8 G/DL (ref 11.7–15.7)

## 2020-09-09 PROCEDURE — 97116 GAIT TRAINING THERAPY: CPT | Mod: GP

## 2020-09-09 PROCEDURE — 25000132 ZZH RX MED GY IP 250 OP 250 PS 637: Performed by: PHYSICIAN ASSISTANT

## 2020-09-09 PROCEDURE — 82947 ASSAY GLUCOSE BLOOD QUANT: CPT | Performed by: ORTHOPAEDIC SURGERY

## 2020-09-09 PROCEDURE — 99202 OFFICE O/P NEW SF 15 MIN: CPT | Performed by: PHYSICIAN ASSISTANT

## 2020-09-09 PROCEDURE — 25000132 ZZH RX MED GY IP 250 OP 250 PS 637: Performed by: ORTHOPAEDIC SURGERY

## 2020-09-09 PROCEDURE — 36415 COLL VENOUS BLD VENIPUNCTURE: CPT | Performed by: ORTHOPAEDIC SURGERY

## 2020-09-09 PROCEDURE — 25000128 H RX IP 250 OP 636: Performed by: ORTHOPAEDIC SURGERY

## 2020-09-09 PROCEDURE — 85018 HEMOGLOBIN: CPT | Performed by: ORTHOPAEDIC SURGERY

## 2020-09-09 PROCEDURE — 99207 ZZC CDG-CODE CATEGORY CHANGED: CPT | Performed by: PHYSICIAN ASSISTANT

## 2020-09-09 PROCEDURE — 97110 THERAPEUTIC EXERCISES: CPT | Mod: GP

## 2020-09-09 PROCEDURE — 97161 PT EVAL LOW COMPLEX 20 MIN: CPT | Mod: GP

## 2020-09-09 RX ADMIN — CEFAZOLIN SODIUM 2 G: 2 INJECTION, SOLUTION INTRAVENOUS at 05:32

## 2020-09-09 RX ADMIN — LATANOPROST 1 DROP: 50 SOLUTION OPHTHALMIC at 00:22

## 2020-09-09 RX ADMIN — HYDROXYZINE HYDROCHLORIDE 25 MG: 25 TABLET, FILM COATED ORAL at 11:14

## 2020-09-09 RX ADMIN — OXYCODONE HYDROCHLORIDE 10 MG: 5 TABLET ORAL at 11:34

## 2020-09-09 RX ADMIN — OXYCODONE HYDROCHLORIDE 10 MG: 5 TABLET ORAL at 05:31

## 2020-09-09 RX ADMIN — OXYCODONE HYDROCHLORIDE 10 MG: 5 TABLET ORAL at 08:43

## 2020-09-09 RX ADMIN — OXYCODONE HYDROCHLORIDE 10 MG: 5 TABLET ORAL at 02:13

## 2020-09-09 RX ADMIN — ACETAMINOPHEN 975 MG: 325 TABLET, FILM COATED ORAL at 03:36

## 2020-09-09 RX ADMIN — ENOXAPARIN SODIUM 40 MG: 40 INJECTION SUBCUTANEOUS at 06:39

## 2020-09-09 NOTE — OP NOTE
Procedure Date: 09/08/2020      PREOPERATIVE DIAGNOSIS:  Right knee primary osteoarthritis.      POSTOPERATIVE DIAGNOSIS:  Right knee primary osteoarthritis.      PROCEDURE:  Right total knee arthroplasty using a Tha NexGen total knee system.      SURGEON:  Bulmaro Douglass MD      FIRST ASSISTANT:  SALVATORE Maldonado.      INDICATIONS:  Ms. Michelle is a very pleasant 75-year-old female who has had ongoing right knee pain for some time now, failed conservative management with oral analgesics, activity modifications, and injections and now wishes to proceed with operative intervention.  We had a long discussion of risks, benefits and alternatives of total knee arthroplasty. She expressed understanding and wished to proceed with surgery.      NARRATIVE EVENTS:  After thorough evaluation and proper identification of patient's extremity to be operated on, Ms. Michelle was taken to the operating room, where she underwent a general anesthetic as well as a femoral nerve block.  She was placed supine on the operating table, and her right leg was prepped and draped in the usual sterile manner.  After appropriate surgical pause to confirm the patient's extremity to be operated on, 10 minutes after received 2 grams of Ancef, her right leg was exsanguinated and tourniquet was raised to 300 mmHg on the right upper thigh.  We approached the right knee through a midline incision centered over the patella.  Skin and soft tissue were sharply dissected down to the patella, where a medium parapatellar arthrotomy was performed.  We performed a mild medial release according to our preoperative templating, everted the patella, removed the infrapatellar fat pad, flexed the knee, removed the osteophytes from the distal femur, drilled and placed our intramedullary guide for our distal femoral resection.  We resected this femur at 5 degrees physiologic valgus to the femur, resecting 9 mm of distal femoral bone.  Once this was  done, we incised the distal femur, sized to fit a size E Tha NexGen femoral component.  We pinned our 4-in-1 cutting block in 3 degrees of external rotation of the posterior condyles in line with the epicondylar axis perpendicular to Whitesides line.  We made our anterior, posterior and chamfer resections and then proceeded to the tibia.  Here we resected this perpendicular to the long axis of the tibia using extramedullary guides.  Once this was done, we then removed the excess soft tissues from the knee, mainly both cruciate ligaments and both menisci, and we made a box cut for our posterior stabilized femoral component and placed our trial implants into position, a size E femur, a size 6 tibia with a 12 mm thick PS polyethylene insert gave us good stability and full range of motion, and the patella tracked nicely.  At this point, we paid our attention to the patella, which measured 23 mm in thickness prior to resection.  We resected this down to 35 mm in diameter x 9 mm thick round tri-peg.  With patella and button in position, this measured 24 mm and tracked quite nicely.  At this point, we then marked our tibial rotation and punched for our tibial keel, thoroughly irrigated the knee, prepared to cement in our final components.  Using one batch of Simplex SpeedSet cement, we cemented in a size E femur, 6 tibia and 35 round tri-peg patellar button.  Once cement had cured, we removed the excess cement from the knee, retrialed our polyethylene inserts, found that a 12 mm PS insert gave the best stability and full range of motion in the knee.  After thoroughly irrigating with both saline and IrriSept solution and after removing all excess cement from the knee, we then impacted the size 12 mm thick PS polyethylene insert into the size 6 tibia.  Once this was locked into position, we thoroughly irrigated the knee one final time and closed the arthrotomy with #1 Vicryl sutures and a running #1 Stratafix suture, and  we closed skin and soft tissue with absorbable sutures and staples in the skin.  The patient was placed in a well-padded postoperative dressing and taken to the recovery room in stable condition.  She tolerated the procedure without difficulty.         JODI TRAORE MD             D: 2020   T: 2020   MT: GERALD      Name:     DAVIS BRIDGES   MRN:      [id number]        Account:        [id number]   :      1945           Procedure Date: 2020      Document: [id number]

## 2020-09-09 NOTE — PROGRESS NOTES
Reviewed discharge instructions and medications with patient. Questions answered. Patient discharged to home with sons mother in law, discharge instuctions, medications (percocet, zofran, aspirin, hydroxyzine, senna) and belongings.    Myesha Christian RN on 9/9/2020 at 11:45 AM

## 2020-09-09 NOTE — PLAN OF CARE
Vital signs stable, on O2 at 2 lpm nasal cannula with capnography.  Dressing dry, cms intact.Pcds; on.  Tolerated clear liquids, no nausea, refused iv zofran.  Pain controlled with ice pack application, tylenol, vistaril and oxycodone.  Sat up  at bedside with assist of 1 using gait belt and walker.Pt has no urge to void, bladder scanned for 239 mls  Plan of care reviewed with pt.

## 2020-09-09 NOTE — PROGRESS NOTES
09/09/20 0901   Quick Adds   Type of Visit Initial PT Evaluation   Living Environment   Lives With alone  (With sons in laws until the 16th)   Living Arrangements house   Home Accessibility stairs to enter home;stairs within home   Number of Stairs, Main Entrance 6   Stair Railings, Main Entrance railings on both sides of stairs   Number of Stairs, Within Home, Primary other (see comments)  (15 )   Stair Railings, Within Home, Primary railings on both sides of stairs   Living Environment Comment pt lives alone in 3 story home with 6 KARLE[address] and 1 stair to get into the main level. All her needs are met on the main level.    Self-Care   Usual Activity Tolerance moderate   Current Activity Tolerance moderate   Equipment Currently Used at Home cane, straight;shower chair;walker, standard   Functional Level Prior   Ambulation 1-->assistive equipment  (use cane for long distances)   Transferring 0-->independent   Fall history within last six months yes   Number of times patient has fallen within last six months 1   Which of the above functional risks had a recent onset or change? ambulation   General Information   Onset of Illness/Injury or Date of Surgery - Date 09/08/20   Referring Physician Bulmaro Douglass MD   Patient/Family Goals Statement Return home   Pertinent History of Current Problem (include personal factors and/or comorbidities that impact the POC) Pt is a 75 year old female s/p R TKA, PMH of L CARLEY.   Weight-Bearing Status - RLE weight-bearing as tolerated   Cognitive Status Examination   Orientation orientation to person, place and time   Pain Assessment   Patient Currently in Pain Yes, see Vital Sign flowsheet  (7/10)   Posture    Posture Protracted shoulders;Forward head position   Range of Motion (ROM)   ROM Comment WFL   Strength   Strength Comments Good global functional strength   Bed Mobility   Bed Mobility Comments IND Supine>sit    Transfer Skills   Transfer Comments Sit>stand with SBA  "  Gait   Gait Comments Pt ambulated with FWW and CGA/SBA   Balance   Balance Comments good dynamic standing balance   General Therapy Interventions   Planned Therapy Interventions gait training;strengthening;transfer training;progressive activity/exercise;home program guidelines   Clinical Impression   Criteria for Skilled Therapeutic Intervention yes, treatment indicated   PT Diagnosis Impaired gait   Influenced by the following impairments Pain   Functional limitations due to impairments Decreased IND with transfers, gait, stairs   Clinical Presentation Stable/Uncomplicated   Clinical Presentation Rationale Medically stable   Clinical Decision Making (Complexity) Low complexity   Therapy Frequency Daily   Predicted Duration of Therapy Intervention (days/wks) 1 day   Anticipated Discharge Disposition Other (see comments)  (Defer to ortho)   Risk & Benefits of therapy have been explained Yes   Patient, Family & other staff in agreement with plan of care Yes   [facility] [employer] TM \"6 Clicks\"   2016, Trustees of [facility], under license to [employer].  All rights reserved.   6 Clicks Short Forms Basic Mobility Inpatient Short Form   [facility] [employer]  \"6 Clicks\" V.2 Basic Mobility Inpatient Short Form   1. Turning from your back to your side while in a flat bed without using bedrails? 4 - None   2. Moving from lying on your back to sitting on the side of a flat bed without using bedrails? 4 - None   3. Moving to and from a bed to a chair (including a wheelchair)? 3 - A Little   4. Standing up from a chair using your arms (e.g., wheelchair, or bedside chair)? 3 - A Little   5. To walk in hospital room? 3 - A Little   6. Climbing 3-5 steps with a railing? 3 - A Little   Basic Mobility Raw Score (Score out of 24.Lower scores equate to lower levels of function) 20   Total Evaluation Time   Total Evaluation Time (Minutes) 5     "

## 2020-09-09 NOTE — CONSULTS
Hospitalist Consultation      Justine Michelle MRN# [id number]   YOB: 1945 Age: 75 year old   Date of Admission: 9/8/2020     Requesting Physician:  Dr. Douglass  Reason for consult: Medical management           Assessment and Plan:   This patient is a 75 year old healthy female with a PMH significant for glaucoma and anemia who is POD 1 s/p right total knee arthroplasty s/p osteoarthritis.     #Right total knee arthroplasty s/p osteoarthritis  Doing well without any significant complaints.  -PT/OT  -Planning to go home with family today  -Pain control and DVT prophylaxis per orthopedics    #Postoperative anemia  Hemoglobin 10.8 when it was recently 12.8.  Patient has some mild dizziness but no orthostatic changes.  Likely will not have any further bleeding and I do not feel strongly on starting her on an iron.  -Recheck hemoglobin as outpatient                 History of Present Illness:   This patient is a 75 year old female who is POD 1 s/p right total knee arthroplasty.   Intra-op report reviewed and showed no intra-op complications.   I/o's reviewed, Currently net positive with good UOP since OR. Hgb slightly decreased at 10.8 with some mild dizziness.   Overnight did pretty well, no complaints, VSS.   Currently, today debby diet, pain controlled, no CP, SOB, no n/v.   O/w other medical problems have been stable, with no recent c/o illness.               Past Medical History:     Past Medical History:   Diagnosis Date     Absence of menstruation 2006    not on HRT     Acute abdominal pain 4/16/2013     Diverticulitis of colon 4/16/2013    also 2003, 2007     Diverticulosis of colon (without mention of hemorrhage)     colonoscopy due in 2013     Myalgia and myositis, unspecified      Obesity, unspecified      Other and unspecified hyperlipidemia      Pain in right knee      Palpitations                Past Surgical History:     Past Surgical History:   Procedure Laterality Date     APPENDECTOMY        ARTHROPLASTY HIP Left 3/17/2020    Procedure: Left total hip arthroplasty using a Biomet G7 acetabulum and Taperloc femoral stem;  Surgeon: Bulmaro Douglass MD;  Location: [facility] OR     C NONSPECIFIC PROCEDURE  2006    nl     C NONSPECIFIC PROCEDURE  2005    nl     CHOLECYSTECTOMY, LAPOROSCOPIC      Cholecystectomy, Laparoscopic     excision of cyst right middle & little finger       HC COLONOSCOPY THRU STOMA, DIAGNOSTIC  2000    nl     ORTHOPEDIC SURGERY Left 1991    buionette     TUBAL LIGATION                   Social History:     Social History     Tobacco Use     Smoking status: Never Smoker     Smokeless tobacco: Never Used   Substance Use Topics     Alcohol use: Yes     Comment: minimal - on holidays     Drug use: No                 Family History:     family history includes C.A.D. in her paternal grandmother; Cancer in her mother; Cardiovascular in her mother; Cerebrovascular Disease in her brother; Diabetes in her maternal grandfather; Heart Disease in her father; Hypertension in her brother and paternal grandfather; No Known Problems in her son; Osteoarthritis in her brother; Thyroid Disease in her mother.              Allergies:     Allergies   Allergen Reactions     Amoxicillin      Augmentin Rash     Bees      hives             Medications:     Prior to Admission medications    Medication Sig Last Dose Taking? Auth Provider   aspirin (ASA) 325 MG EC tablet Take 1 tablet (325 mg) by mouth 2 times daily  Yes Bulmaro Douglass MD   calcium carbonate 750 MG CHEW Take 1,500 mg by mouth daily  9/7/2020 at 1900 Yes Unknown, Entered By History   Cyanocobalamin (B-12 SUPER STRENGTH) 5000 MCG/ML LIQD Place 0.5 mLs under the tongue daily FOR VITAMIN B12 SUPPLEMENTATION, PLEASE PLACE UNDER THE TONGUE 9/7/2020 at 1900 Yes Adriana Hernandez MD   FIBER PO Take 1 tablet by mouth daily as needed  9/7/2020 at 1900 Yes Reported, Patient   hydrOXYzine (ATARAX) 25 MG tablet Take 1 tablet (25 mg) by  "mouth every 6 hours as needed for itching or anxiety (with pain, moderate pain)  Yes Bulmaro Douglass MD   latanoprost (XALATAN) 0.005 % ophthalmic solution Place 1 drop into both eyes daily  9/7/2020 at 1900 Yes Reported, Patient   ondansetron (ZOFRAN-ODT) 4 MG ODT tab Take 1-2 tablets (4-8 mg) by mouth every 8 hours as needed for nausea Dissolve ON the tongue.  Yes Bulmaro Douglass MD   oxyCODONE-acetaminophen (PERCOCET) 5-325 MG tablet Take 1-2 tablets by mouth every 4 hours as needed for pain (moderate to severe)  Yes Bulmaro Douglass MD   senna-docusate (SENOKOT-S/PERICOLACE) 8.6-50 MG tablet Take 1-2 tablets by mouth 2 times daily Take while on oral narcotics to prevent or treat constipation.  Yes Bulmaro Douglass MD   vitamin C (ASCORBIC ACID) 500 MG tablet Take 1,000 mg by mouth daily 9/7/2020 at 1900 Yes Reported, Patient   vitamin D3 (CHOLECALCIFEROL) 25 mcg (10209 units) capsule Take 2 capsules by mouth every 7 days 9/7/2020 at 1900 Yes Reported, Patient               Review of Systems:   A comprehensive greater than 10 system review of systems was carried out.  Pertinent positives and negatives are noted above.  Otherwise negative for contributory info.            Physical Exam:   Vitals were reviewed  Blood pressure 126/64, pulse 63, temperature 98.9  F (37.2  C), temperature source Temporal, resp. rate 16, height 1.702 m (5' 7\"), weight 92.7 kg (204 lb 4.8 oz), SpO2 96 %.  Exam:    GENERAL:  Comfortable.  PSYCH: pleasant, oriented, No acute distress.  HEENT:  PERRLA. Normal conjunctiva, normal hearing, nasal mucosa and Oropharynx are normal.  NECK:  Supple, no neck vein distention, adenopathy or bruits, normal thyroid.  HEART:  Normal S1, S2 with no murmur, no pericardial rub, S3 or S4.  LUNGS:  Clear to auscultation, normal Respiratory effort.  ABDOMEN:  Soft, no hepatosplenomegaly, normal bowel sounds.  EXTREMITIES:  No pedal edema, +2 pulses bilateral and " equal.  SKIN:  Dry to touch, No rash, wound or ulcerations.  NEUROLOGIC:  Nonfocal with normal cranial nerve and motor power and sensation.            Data:   Past 24 hours labs, studies, and imaging were reviewed.  Recent Labs   Lab 09/09/20 0730 09/08/20 1903   HGB 10.8*  --    PLT  --  177     Recent Labs   Lab 09/09/20 0730 09/08/20 1903   *  --    CR  --  0.68   GFRESTIMATED  --  86   GFRESTBLACK  --  >90       Radha Purcell PA-C    Pt discussed with Dr. Fernandez who agrees with the care as discussed above.

## 2020-09-09 NOTE — PROGRESS NOTES
[facility]  Spoke with patient to discuss plans for HC. Patient to be discharged home today and has agreed to have [facility] follow with physical therapy. Patient care support center processing referral. Patient verbalized understanding that initial visit is scheduled for 9/10/20. Patient has 24 hour phone number for [facility] for any questions or concerns.    Becky Boland RN   [facility] Liaison   [phone]

## 2020-09-09 NOTE — PLAN OF CARE
Patient vital signs are at baseline: Yes  Patient able to ambulate as they were prior to admission or with assist devices provided by therapies during their stay:  No,  Reason:  Pt ambulating w/ GB/W, but experiencing pain and uncertainty. PT scheduled for later today.  Patient MUST void prior to discharge:  Yes ut pt retaining 300+ ml as PVR. Potentially r/t pt's cystocele. Pt does not experience feelings of bladder fullness after voiding.  Patient able to tolerate oral intake:  Yes, still only eating soft foods but progressing.  Pain has adequate pain control using Oral analgesics:  Yes     VSS, A&Ox4, Ax1 GB/W to bedside commode. O2 2L, NS 100ml/hr.  Pain controlled with APAP and oxycodone. LS clear, BS hypoactive. Mild swelling to RLE, dressing CDI. Voiding ~ 500 ml and retaining ~ 300 ml each time. Will continue to monitor.    Plan to discharge home today.

## 2020-09-09 NOTE — DISCHARGE INSTRUCTIONS
Your home care referral was sent to [facility]  If you haven't heard from them within the next 24-48 hours,  Please call them at [phone]

## 2020-09-09 NOTE — PLAN OF CARE
Patient vital signs are at baseline: Yes  Patient able to ambulate as they were prior to admission or with assist devices provided by therapies during their stay:  No,  Reason:  Pt experiencing pain with ambulation and only able to stand to the bedside commode.  Patient MUST void prior to discharge:  No,  Reason:  Voiding, but + ml.  Patient able to tolerate oral intake:  No,  Reason:  Currently tolerating full liquids and soft foods  Pain has adequate pain control using Oral analgesics:  Yes

## 2020-09-09 NOTE — PLAN OF CARE
PT: Orders received, eval complete and treatment initiated. Pt is a 75 year old female s/p R TKA, PMH of L CARLEY. pt lives alone in 3 story home with 6 KARLIE and 1 stair to get into the main level. All her needs are met on the main level. Pt reports IND at baseline, but used cane for longer distances.     Patient plan: Home  Current status: Pt sitting at EOB upon arrival. Sit<>supine IND. Initiated HEP ; AP, QS, SAQ, HS, SLR, knee straitening stretch x 10. Educated on pain expectation with exercises and importance of doing exercises daily until OP PT. Sit>stand with FWW and SBA, cued for proper hand placement. Pt ambulated 100' with FWW and CGA/SBA, cued for step through gait pattern, pt reported increased pain with change in gait pattern. Pt negotiated 4 steps with bilateral rails and CGA/SBA. Pt supine at end of session.   Anticipated status at discharge: Pt will be be SBA/Mod I with bed mobilty, transfers, gait, and stairs. Pt will have assist from son's in laws.     Physical Therapy Discharge Summary    Reason for therapy discharge:    All goals and outcomes met, no further needs identified.    Progress towards therapy goal(s). See goals on Care Plan in [facility] electronic health record for goal details.  Goals met    Therapy recommendation(s):    Defer to ortho.

## 2020-09-14 ENCOUNTER — TRANSFERRED RECORDS (OUTPATIENT)
Dept: HEALTH INFORMATION MANAGEMENT | Facility: CLINIC | Age: 75
End: 2020-09-14

## 2020-09-22 ENCOUNTER — TRANSFERRED RECORDS (OUTPATIENT)
Dept: HEALTH INFORMATION MANAGEMENT | Facility: CLINIC | Age: 75
End: 2020-09-22

## 2020-10-12 ENCOUNTER — THERAPY VISIT (OUTPATIENT)
Dept: PHYSICAL THERAPY | Facility: CLINIC | Age: 75
End: 2020-10-12
Payer: COMMERCIAL

## 2020-10-12 DIAGNOSIS — M25.561 RIGHT KNEE PAIN: ICD-10-CM

## 2020-10-12 DIAGNOSIS — Z47.89 AFTERCARE FOLLOWING SURGERY OF THE MUSCULOSKELETAL SYSTEM: ICD-10-CM

## 2020-10-12 DIAGNOSIS — Z96.651 STATUS POST TOTAL RIGHT KNEE REPLACEMENT: Primary | ICD-10-CM

## 2020-10-12 PROCEDURE — 97161 PT EVAL LOW COMPLEX 20 MIN: CPT | Mod: GP | Performed by: PHYSICAL THERAPIST

## 2020-10-12 PROCEDURE — 97110 THERAPEUTIC EXERCISES: CPT | Mod: GP | Performed by: PHYSICAL THERAPIST

## 2020-10-12 ASSESSMENT — ACTIVITIES OF DAILY LIVING (ADL)
KNEE_ACTIVITY_OF_DAILY_LIVING_SUM: 38
SIT WITH YOUR KNEE BENT: ACTIVITY IS SOMEWHAT DIFFICULT
KNEEL ON THE FRONT OF YOUR KNEE: I AM UNABLE TO DO THE ACTIVITY
RISE FROM A CHAIR: ACTIVITY IS SOMEWHAT DIFFICULT
GIVING WAY, BUCKLING OR SHIFTING OF KNEE: THE SYMPTOM AFFECTS MY ACTIVITY SLIGHTLY
KNEE_ACTIVITY_OF_DAILY_LIVING_SCORE: 54.29
HOW_WOULD_YOU_RATE_THE_OVERALL_FUNCTION_OF_YOUR_KNEE_DURING_YOUR_USUAL_DAILY_ACTIVITIES?: NEARLY NORMAL
STAND: ACTIVITY IS SOMEWHAT DIFFICULT
WALK: ACTIVITY IS SOMEWHAT DIFFICULT
HOW_WOULD_YOU_RATE_THE_CURRENT_FUNCTION_OF_YOUR_KNEE_DURING_YOUR_USUAL_DAILY_ACTIVITIES_ON_A_SCALE_FROM_0_TO_100_WITH_100_BEING_YOUR_LEVEL_OF_KNEE_FUNCTION_PRIOR_TO_YOUR_INJURY_AND_0_BEING_THE_INABILITY_TO_PERFORM_ANY_OF_YOUR_USUAL_DAILY_ACTIVITIES?: 78
PAIN: THE SYMPTOM AFFECTS MY ACTIVITY MODERATELY
SWELLING: THE SYMPTOM AFFECTS MY ACTIVITY MODERATELY
GO UP STAIRS: ACTIVITY IS NOT DIFFICULT
SQUAT: I AM UNABLE TO DO THE ACTIVITY
WEAKNESS: THE SYMPTOM AFFECTS MY ACTIVITY SLIGHTLY
STIFFNESS: THE SYMPTOM AFFECTS MY ACTIVITY MODERATELY
LIMPING: I HAVE THE SYMPTOM BUT IT DOES NOT AFFECT MY ACTIVITY
GO DOWN STAIRS: ACTIVITY IS NOT DIFFICULT
AS_A_RESULT_OF_YOUR_KNEE_INJURY,_HOW_WOULD_YOU_RATE_YOUR_CURRENT_LEVEL_OF_DAILY_ACTIVITY?: NEARLY NORMAL
RAW_SCORE: 38

## 2020-10-12 NOTE — PROGRESS NOTES
[facility] Initial Evaluation  Subjective:  Physical Therapy Initial Evaluation   10/12/2020   Precautions/Restrictions/MD instructions: PT eval and treat   Therapist Impression:   Pt is a 76 y/o female, with 4 week history of R knee pain; 4 wks s/p R TKA. Pt presents with pain, decreased ROM/mobility, poor balance and decreased strength. These impairments limit their ability to walk, going up and down stairs, and standing prolonged periods. Skilled PT services necessary in order to reduce impairments and improve independent function.    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  Job duties/hobbies: gardening, dancing   Current HEP/exercise regimen: would like to get back into routine of core exercises and isometrics   Patient's goals: Full ROM and painfree   Medications: muscle relaxors and pain  General health as reported by patient: good   Return to MD: 6 week follow (10/20/2020)  Red Flags: none to note      HPI                    Objective:  KNEE:    Standing Posture: shift weight to left side; and slight bend in knee    SL Balance:   Will test future session    Gait: Using cane, slow gait, lacking knee flexion and extension    Functional:   - sit to stand: need arms to help boost up to stand    Swelling:    L R   Joint line 37.5cm 39.5cm           AROM: (* indicates patient's pain)   PROM:(over pressure)   L  R L R   Hyperextension   0  0    Extension   0 5* 0 5*   Flexion   122 90* 122 90*      Strength:   L R   HIP     Flex 4-/5 4-/5   Ext 4-/5 4-/5   ABd 3+/5 3+/5   KNEE     QS'S: good bilaterally      Palpation: incision healing well; and slight warm to touch    Patellar mobs: fair        System    Physical Exam    General     ROS    Assessment/Plan:    Patient is a 75 year old female with right side knee complaints.    Patient has the following significant findings with corresponding treatment plan.                Diagnosis 1:  R TKA  Pain -  hot/cold therapy, manual therapy, self management, directional preference exercise and home program  Decreased ROM/flexibility - manual therapy and therapeutic exercise  Decreased joint mobility - manual therapy and therapeutic exercise  Decreased strength - therapeutic exercise and therapeutic activities  Impaired balance - neuro re-education and therapeutic activities  Decreased proprioception - neuro re-education and therapeutic activities  Inflammation - cold therapy and self management/home program  Impaired gait - gait training  Impaired muscle performance - neuro re-education  Decreased function - therapeutic activities  Instability -  Therapeutic Activity  Therapeutic Exercise    Therapy Evaluation Codes:     Cumulative Therapy Evaluation is: Low complexity.    Previous and current functional limitations:  (See Goal Flow Sheet for this information)    Short term and Long term goals: (See Goal Flow Sheet for this information)     Communication ability:  Patient appears to be able to clearly communicate and understand verbal and written communication and follow directions correctly.  Treatment Explanation - The following has been discussed with the patient:   RX ordered/plan of care  Anticipated outcomes  Possible risks and side effects  This patient would benefit from PT intervention to resume normal activities.   Rehab potential is good.    Frequency:  1 X week, once daily  Duration:  for 6 weeks  Discharge Plan:  Achieve all LTG.  Independent in home  treatment program.  Reach maximal therapeutic benefit.    Please refer to the daily flowsheet for treatment today, total treatment time and time spent performing 1:1 timed codes.

## 2020-10-15 ENCOUNTER — THERAPY VISIT (OUTPATIENT)
Dept: PHYSICAL THERAPY | Facility: CLINIC | Age: 75
End: 2020-10-15
Payer: COMMERCIAL

## 2020-10-15 DIAGNOSIS — M25.561 RIGHT KNEE PAIN: ICD-10-CM

## 2020-10-15 DIAGNOSIS — Z96.651 STATUS POST TOTAL RIGHT KNEE REPLACEMENT: Primary | ICD-10-CM

## 2020-10-15 DIAGNOSIS — Z47.89 AFTERCARE FOLLOWING SURGERY OF THE MUSCULOSKELETAL SYSTEM: ICD-10-CM

## 2020-10-15 PROCEDURE — 97110 THERAPEUTIC EXERCISES: CPT | Mod: GP | Performed by: PHYSICAL THERAPIST

## 2020-10-15 PROCEDURE — 97140 MANUAL THERAPY 1/> REGIONS: CPT | Mod: GP | Performed by: PHYSICAL THERAPIST

## 2020-10-19 ENCOUNTER — THERAPY VISIT (OUTPATIENT)
Dept: PHYSICAL THERAPY | Facility: CLINIC | Age: 75
End: 2020-10-19
Payer: COMMERCIAL

## 2020-10-19 DIAGNOSIS — Z96.651 STATUS POST TOTAL RIGHT KNEE REPLACEMENT: Primary | ICD-10-CM

## 2020-10-19 DIAGNOSIS — M25.561 RIGHT KNEE PAIN: ICD-10-CM

## 2020-10-19 DIAGNOSIS — Z47.89 AFTERCARE FOLLOWING SURGERY OF THE MUSCULOSKELETAL SYSTEM: ICD-10-CM

## 2020-10-19 PROCEDURE — 97112 NEUROMUSCULAR REEDUCATION: CPT | Mod: GP | Performed by: PHYSICAL THERAPIST

## 2020-10-19 PROCEDURE — 97110 THERAPEUTIC EXERCISES: CPT | Mod: GP | Performed by: PHYSICAL THERAPIST

## 2020-10-19 PROCEDURE — 97530 THERAPEUTIC ACTIVITIES: CPT | Mod: GP | Performed by: PHYSICAL THERAPIST

## 2020-10-20 ENCOUNTER — TRANSFERRED RECORDS (OUTPATIENT)
Dept: HEALTH INFORMATION MANAGEMENT | Facility: CLINIC | Age: 75
End: 2020-10-20

## 2020-10-26 ENCOUNTER — THERAPY VISIT (OUTPATIENT)
Dept: PHYSICAL THERAPY | Facility: CLINIC | Age: 75
End: 2020-10-26
Payer: COMMERCIAL

## 2020-10-26 ENCOUNTER — TRANSFERRED RECORDS (OUTPATIENT)
Dept: HEALTH INFORMATION MANAGEMENT | Facility: CLINIC | Age: 75
End: 2020-10-26

## 2020-10-26 DIAGNOSIS — M25.561 RIGHT KNEE PAIN: ICD-10-CM

## 2020-10-26 DIAGNOSIS — Z47.89 AFTERCARE FOLLOWING SURGERY OF THE MUSCULOSKELETAL SYSTEM: ICD-10-CM

## 2020-10-26 DIAGNOSIS — Z96.651 STATUS POST TOTAL RIGHT KNEE REPLACEMENT: Primary | ICD-10-CM

## 2020-10-26 PROCEDURE — 97530 THERAPEUTIC ACTIVITIES: CPT | Mod: GP | Performed by: PHYSICAL THERAPIST

## 2020-10-26 PROCEDURE — 97112 NEUROMUSCULAR REEDUCATION: CPT | Mod: GP | Performed by: PHYSICAL THERAPIST

## 2020-10-26 NOTE — LETTER
[facility] [place]  [address]  [facility] [address]  [phone]    2020    Re: Justine Michelle   :   1945  MRN:  [id number]   REFERRING PHYSICIAN:   Bulmaro Douglass  [facility] [place]  Date of Initial Evaluation:  10/12/2020  Visits:  Rxs Used: 4  Reason for Referral:   Status post total right knee replacement  Right knee pain Aftercare following surgery of the musculoskeletal system    PROGRESS  REPORT  Progress reporting period is from 10/12/2020 to 10/26/2020.       SUBJECTIVE  Subjective: Overall knee was sore after last session. But today feeling better. Still icing and elevating daily. Exercises going well at home. Walking less pain and less limp. Can only be on her feet for 1-1.5 hrs, then knee needs a rest. Stairs at home, still one step at a time (up with non surgical and down leading with surgical)    [id number]} Current Pain level: 4/10.      Initial Pain level: 8/10.   Changes in function:  Yes (See Goal flowsheet attached for changes in current functional level)  Adverse reaction to treatment or activity: None  OBJECTIVE  Changes noted in objective findings:  Yes,   Objective: AROM: 0-104 (supine). Full revolutions forward on the bike. Good quad activation. No lag with SLR flexion. Improving balance.   ASSESSMENT/PLAN  Updated problem list and treatment plan: Diagnosis 1:  R knee pain; s/p TKA  Pain -  hot/cold therapy, self management, education and home program  Decreased ROM/flexibility - manual therapy and therapeutic exercise  Decreased joint mobility - manual therapy and therapeutic exercise  Decreased strength - therapeutic exercise and therapeutic activities  Impaired balance - neuro re-education and therapeutic activities  Decreased proprioception - neuro re-education and therapeutic activities  Inflammation - cold therapy and self management/home program  Edema - vasopneumatics and self management/home  program  Impaired muscle performance - neuro re-education  Decreased function - therapeutic activities  STG/LTGs have been met or progress has been made towards goals:  Yes (See Goal flow sheet completed today.)  Assessment of Progress: The patient's condition is improving.  Self Management Plans:  Patient has been instructed in a home treatment program.  I have re-evaluated this patient and find that the nature, scope, duration and intensity of the therapy is appropriate for the medical condition of the patient.  Justine continues to require the following intervention to meet STG and LTG's:  PT    Re: Justine Michelle   :   1945      Recommendations:  This patient would benefit from continued therapy.     Frequency:  1 X week, once daily  Duration:  for 6 weeks        Thank you for your referral.    INQUIRIES  Therapist: Flora Mckeon PT, DPT, [facility]   [facility]  [address]  [place] [address]  Phone: [phone]  Fax: [phone]

## 2020-10-26 NOTE — PROGRESS NOTES
Subjective:  HPI  Physical Exam                    Objective:  System    Physical Exam    General     ROS    Assessment/Plan:    PROGRESS  REPORT    Progress reporting period is from 10/12/2020 to 10/26/2020.       SUBJECTIVE  Subjective: Overall knee was sore after last session. But today feeling better. Still icing and elevating daily. Exercises going well at home. Walking less pain and less limp. Can only be on her feet for 1-1.5 hrs, then knee needs a rest. Stairs at home, still one step at a time (up with non surgical and down leading with surgical)    [id number]} Current Pain level: 4/10.      Initial Pain level: 8/10.   Changes in function:  Yes (See Goal flowsheet attached for changes in current functional level)  Adverse reaction to treatment or activity: None    OBJECTIVE  Changes noted in objective findings:  Yes,   Objective: AROM: 0-104 (supine). Full revolutions forward on the bike. Good quad activation. No lag with SLR flexion. Improving balance.     ASSESSMENT/PLAN  Updated problem list and treatment plan: Diagnosis 1:  R knee pain; s/p TKA  Pain -  hot/cold therapy, self management, education and home program  Decreased ROM/flexibility - manual therapy and therapeutic exercise  Decreased joint mobility - manual therapy and therapeutic exercise  Decreased strength - therapeutic exercise and therapeutic activities  Impaired balance - neuro re-education and therapeutic activities  Decreased proprioception - neuro re-education and therapeutic activities  Inflammation - cold therapy and self management/home program  Edema - vasopneumatics and self management/home program  Impaired muscle performance - neuro re-education  Decreased function - therapeutic activities  STG/LTGs have been met or progress has been made towards goals:  Yes (See Goal flow sheet completed today.)  Assessment of Progress: The patient's condition is improving.  Self Management Plans:  Patient has been instructed in a home treatment  program.  I have re-evaluated this patient and find that the nature, scope, duration and intensity of the therapy is appropriate for the medical condition of the patient.  Justine continues to require the following intervention to meet STG and LTG's:  PT    Recommendations:  This patient would benefit from continued therapy.     Frequency:  1 X week, once daily  Duration:  for 6 weeks        Please refer to the daily flowsheet for treatment today, total treatment time and time spent performing 1:1 timed codes.

## 2020-11-06 ENCOUNTER — THERAPY VISIT (OUTPATIENT)
Dept: PHYSICAL THERAPY | Facility: CLINIC | Age: 75
End: 2020-11-06
Payer: COMMERCIAL

## 2020-11-06 DIAGNOSIS — M25.561 RIGHT KNEE PAIN: ICD-10-CM

## 2020-11-06 DIAGNOSIS — Z96.651 STATUS POST TOTAL RIGHT KNEE REPLACEMENT: Primary | ICD-10-CM

## 2020-11-06 DIAGNOSIS — Z47.89 AFTERCARE FOLLOWING SURGERY OF THE MUSCULOSKELETAL SYSTEM: ICD-10-CM

## 2020-11-06 PROCEDURE — 97110 THERAPEUTIC EXERCISES: CPT | Mod: GP | Performed by: PHYSICAL THERAPIST

## 2020-11-06 PROCEDURE — 97112 NEUROMUSCULAR REEDUCATION: CPT | Mod: GP | Performed by: PHYSICAL THERAPIST

## 2020-11-06 ASSESSMENT — ACTIVITIES OF DAILY LIVING (ADL)
SIT WITH YOUR KNEE BENT: ACTIVITY IS NOT DIFFICULT
RISE FROM A CHAIR: ACTIVITY IS MINIMALLY DIFFICULT
GO UP STAIRS: ACTIVITY IS NOT DIFFICULT
KNEEL ON THE FRONT OF YOUR KNEE: ACTIVITY IS FAIRLY DIFFICULT
SQUAT: ACTIVITY IS MINIMALLY DIFFICULT
STIFFNESS: THE SYMPTOM AFFECTS MY ACTIVITY SLIGHTLY
WEAKNESS: I HAVE THE SYMPTOM BUT IT DOES NOT AFFECT MY ACTIVITY
PAIN: I HAVE THE SYMPTOM BUT IT DOES NOT AFFECT MY ACTIVITY
KNEE_ACTIVITY_OF_DAILY_LIVING_SUM: 57
STAND: ACTIVITY IS NOT DIFFICULT
GIVING WAY, BUCKLING OR SHIFTING OF KNEE: I DO NOT HAVE THE SYMPTOM
HOW_WOULD_YOU_RATE_THE_CURRENT_FUNCTION_OF_YOUR_KNEE_DURING_YOUR_USUAL_DAILY_ACTIVITIES_ON_A_SCALE_FROM_0_TO_100_WITH_100_BEING_YOUR_LEVEL_OF_KNEE_FUNCTION_PRIOR_TO_YOUR_INJURY_AND_0_BEING_THE_INABILITY_TO_PERFORM_ANY_OF_YOUR_USUAL_DAILY_ACTIVITIES?: 80
SWELLING: THE SYMPTOM AFFECTS MY ACTIVITY SLIGHTLY
LIMPING: I HAVE THE SYMPTOM BUT IT DOES NOT AFFECT MY ACTIVITY
HOW_WOULD_YOU_RATE_THE_OVERALL_FUNCTION_OF_YOUR_KNEE_DURING_YOUR_USUAL_DAILY_ACTIVITIES?: NEARLY NORMAL
AS_A_RESULT_OF_YOUR_KNEE_INJURY,_HOW_WOULD_YOU_RATE_YOUR_CURRENT_LEVEL_OF_DAILY_ACTIVITY?: NEARLY NORMAL
KNEE_ACTIVITY_OF_DAILY_LIVING_SCORE: 81.43
WALK: ACTIVITY IS MINIMALLY DIFFICULT
RAW_SCORE: 57
GO DOWN STAIRS: ACTIVITY IS NOT DIFFICULT

## 2020-11-30 ENCOUNTER — THERAPY VISIT (OUTPATIENT)
Dept: PHYSICAL THERAPY | Facility: CLINIC | Age: 75
End: 2020-11-30
Payer: COMMERCIAL

## 2020-11-30 DIAGNOSIS — Z47.89 AFTERCARE FOLLOWING SURGERY OF THE MUSCULOSKELETAL SYSTEM: ICD-10-CM

## 2020-11-30 DIAGNOSIS — Z96.651 STATUS POST TOTAL RIGHT KNEE REPLACEMENT: Primary | ICD-10-CM

## 2020-11-30 DIAGNOSIS — M25.561 RIGHT KNEE PAIN: ICD-10-CM

## 2020-11-30 PROCEDURE — 97110 THERAPEUTIC EXERCISES: CPT | Mod: GP | Performed by: PHYSICAL THERAPIST

## 2020-11-30 PROCEDURE — 97112 NEUROMUSCULAR REEDUCATION: CPT | Mod: GP | Performed by: PHYSICAL THERAPIST

## 2020-11-30 PROCEDURE — 97530 THERAPEUTIC ACTIVITIES: CPT | Mod: GP | Performed by: PHYSICAL THERAPIST

## 2020-11-30 NOTE — LETTER
[facility] [place]  [address] [facility] [address]  [phone]    2020    Re: Justine Michelle   :   1945  MRN:  [id number]   REFERRING PHYSICIAN:   Bulmaro Douglass  [facility] [place]  Date of Initial Evaluation:  10/12/2020  Visits:  Rxs Used: 6  Reason for Referral:   Status post total right knee replacement  Right knee pain, Aftercare following surgery of the musculoskeletal system    DISCHARGE REPORT  Progress reporting period is from 10/26/2020 to 2020.       SUBJECTIVE    Subjective: Overall knee is feeling well. Still increasing reps at home for strengthening exercises. Going up and down stairs reciprocally (getting easier). Able to get out of bed in the morning without soreness/achiness/pain. Able to walk community distances without AD (gets tired, but not painful).      Current Pain level: 1/10.      Initial Pain level: 8/10.   Changes in function:  Yes (See Goal flowsheet attached for changes in current functional level)  Adverse reaction to treatment or activity: None    OBJECTIVE  Changes noted in objective findings:  Yes,   Objective: AROM: R knee: 0-120. Good quad activation. SLR flexion, no lag (good strength). Very good review of HEP, continuing to improve with balance and strength. Pt is ready to be D/C from PT.      ASSESSMENT/PLAN  Updated problem list and treatment plan: Diagnosis 1:  R TKA  Pain -  home program  Decreased strength - therapeutic exercise, therapeutic activities and home program  Impaired balance - neuro re-education, therapeutic activities and home program  Impaired gait - gait training and home program  Impaired muscle performance - neuro re-education and home program  STG/LTGs have been met or progress has been made towards goals:  Yes (See Goal flow sheet completed today.)  Assessment of Progress: The patient's condition is improving.  Self Management Plans:  Patient has been instructed  in a home treatment program.  I have re-evaluated this patient and find that the nature, scope, duration and intensity of the therapy is appropriate for the medical condition of the patient.  Justine continues to require the following intervention to meet STG and LTG's:  PT intervention is no longer required to meet STG/LTG.        Re: Justine Michelle   :   1945      Recommendations:  This patient is ready to be discharged from therapy and continue their home treatment program.            Thank you for your referral.    INQUIRIES  Therapist: Sis Titus   [facility] [place]  [address] [place] [address]  Phone: [phone]  Fax: [phone]

## 2020-11-30 NOTE — PROGRESS NOTES
Subjective:  HPI  Physical Exam                    Objective:  System    Physical Exam    General     ROS    Assessment/Plan:    DISCHARGE REPORT    Progress reporting period is from 10/26/2020 to 11/30/2020.       SUBJECTIVE    Subjective: Overall knee is feeling well. Still increasing reps at home for strengthening exercises. Going up and down stairs reciprocally (getting easier). Able to get out of bed in the morning without soreness/achiness/pain. Able to walk community distances without AD (gets tired, but not painful).      Current Pain level: 1/10.      Initial Pain level: 8/10.   Changes in function:  Yes (See Goal flowsheet attached for changes in current functional level)  Adverse reaction to treatment or activity: None    OBJECTIVE  Changes noted in objective findings:  Yes,   Objective: AROM: R knee: 0-120. Good quad activation. SLR flexion, no lag (good strength). Very good review of HEP, continuing to improve with balance and strength. Pt is ready to be D/C from PT.      ASSESSMENT/PLAN  Updated problem list and treatment plan: Diagnosis 1:  R TKA  Pain -  home program  Decreased strength - therapeutic exercise, therapeutic activities and home program  Impaired balance - neuro re-education, therapeutic activities and home program  Impaired gait - gait training and home program  Impaired muscle performance - neuro re-education and home program  STG/LTGs have been met or progress has been made towards goals:  Yes (See Goal flow sheet completed today.)  Assessment of Progress: The patient's condition is improving.  Self Management Plans:  Patient has been instructed in a home treatment program.  I have re-evaluated this patient and find that the nature, scope, duration and intensity of the therapy is appropriate for the medical condition of the patient.  Justine continues to require the following intervention to meet STG and LTG's:  PT intervention is no longer required to meet  STG/LTG.    Recommendations:  This patient is ready to be discharged from therapy and continue their home treatment program.    Please refer to the daily flowsheet for treatment today, total treatment time and time spent performing 1:1 timed codes.

## 2020-12-01 ENCOUNTER — TRANSFERRED RECORDS (OUTPATIENT)
Dept: HEALTH INFORMATION MANAGEMENT | Facility: CLINIC | Age: 75
End: 2020-12-01

## 2021-01-26 ENCOUNTER — TRANSFERRED RECORDS (OUTPATIENT)
Dept: HEALTH INFORMATION MANAGEMENT | Facility: CLINIC | Age: 76
End: 2021-01-26

## 2021-01-27 RX ORDER — CLINDAMYCIN PHOSPHATE 900 MG/50ML
900 INJECTION, SOLUTION INTRAVENOUS
Status: CANCELLED | OUTPATIENT
Start: 2021-01-27

## 2021-01-27 RX ORDER — CLINDAMYCIN PHOSPHATE 900 MG/50ML
900 INJECTION, SOLUTION INTRAVENOUS SEE ADMIN INSTRUCTIONS
Status: CANCELLED | OUTPATIENT
Start: 2021-01-27

## 2021-02-05 DIAGNOSIS — Z11.59 ENCOUNTER FOR SCREENING FOR OTHER VIRAL DISEASES: ICD-10-CM

## 2021-02-08 ENCOUNTER — OFFICE VISIT (OUTPATIENT)
Dept: INTERNAL MEDICINE | Facility: CLINIC | Age: 76
End: 2021-02-08
Payer: COMMERCIAL

## 2021-02-08 VITALS
TEMPERATURE: 97.8 F | WEIGHT: 200 LBS | SYSTOLIC BLOOD PRESSURE: 148 MMHG | HEIGHT: 67 IN | HEART RATE: 75 BPM | BODY MASS INDEX: 31.39 KG/M2 | OXYGEN SATURATION: 98 % | DIASTOLIC BLOOD PRESSURE: 88 MMHG

## 2021-02-08 DIAGNOSIS — N81.11 MIDLINE CYSTOCELE: ICD-10-CM

## 2021-02-08 DIAGNOSIS — Z01.818 PREOP GENERAL PHYSICAL EXAM: Primary | ICD-10-CM

## 2021-02-08 LAB — HGB BLD-MCNC: 12.7 G/DL (ref 11.7–15.7)

## 2021-02-08 PROCEDURE — 85018 HEMOGLOBIN: CPT | Performed by: INTERNAL MEDICINE

## 2021-02-08 PROCEDURE — 99214 OFFICE O/P EST MOD 30 MIN: CPT | Performed by: INTERNAL MEDICINE

## 2021-02-08 PROCEDURE — 36415 COLL VENOUS BLD VENIPUNCTURE: CPT | Performed by: INTERNAL MEDICINE

## 2021-02-08 PROCEDURE — 93000 ELECTROCARDIOGRAM COMPLETE: CPT | Performed by: INTERNAL MEDICINE

## 2021-02-08 ASSESSMENT — MIFFLIN-ST. JEOR: SCORE: 1434.82

## 2021-02-08 NOTE — PROGRESS NOTES
[place] [address]  [facility] [address]  Phone: [phone]  Primary Provider: Courtney Ambrose  Pre-op Performing Provider: COURTNEY AMBROSE      PREOPERATIVE EVALUATION:  Today's date: 2/8/2021    Justine Michelle is a 75 year old female who presents for a preoperative evaluation.    Surgical Information:  Surgery/Procedure:   ROBOTIC-ASSISTED TOTAL HYSTERECTOMY, BILATERAL SALPINGO-OOPHORECTOMY N/A General   SACROCOLPOPEXY, CYSTOSCOPY, ADVANTAGE FIT SLING       Surgery Location: [facility]  Surgeon: Dr. Marx  Surgery Date: 02/18/21  Time of Surgery: 7:30AM  Where patient plans to recover: At home alone  Fax number for surgical facility: Note does not need to be faxed, will be available electronically in Epic.    Type of Anesthesia Anticipated: General    Assessment & Plan     The proposed surgical procedure is considered INTERMEDIATE risk.    Preop general physical exam    - EKG 12-lead complete w/read - Clinics    Midline cystocele             Risks and Recommendations:  The patient has the following additional risks and recommendations for perioperative complications:   - No identified additional risk factors other than previously addressed        RECOMMENDATION:  APPROVAL GIVEN to proceed with proposed procedure, without further diagnostic evaluation.            Subjective     HPI related to upcoming procedure: scheduled for cystocele surgery and hysterectomy.   No acute complaints, no medication change or new medical conditions.      Preop Questions 2/4/2021   1. Have you ever had a heart attack or stroke? No   2. Have you ever had surgery on your heart or blood vessels, such as a stent placement, a coronary artery bypass, or surgery on an artery in your head, neck, heart, or legs? No   3. Do you have chest pain with activity? No   4. Do you have a history of  heart failure? No   5. Do you currently have a cold, bronchitis or symptoms of other infection? No    6. Do you have a cough, shortness of breath, or wheezing? No   7. Do you or anyone in your family have previous history of blood clots? No   8. Do you or does anyone in your family have a serious bleeding problem such as prolonged bleeding following surgeries or cuts? No   9. Have you ever had problems with anemia or been told to take iron pills? No   10. Have you had any abnormal blood loss such as black, tarry or bloody stools, or abnormal vaginal bleeding? No   11. Have you ever had a blood transfusion? No   12. Are you willing to have a blood transfusion if it is medically needed before, during, or after your surgery? Yes   13. Have you or any of your relatives ever had problems with anesthesia? No   14. Do you have sleep apnea, excessive snoring or daytime drowsiness? No   15. Do you have any artifical heart valves or other implanted medical devices like a pacemaker, defibrillator, or continuous glucose monitor? No   16. Do you have artificial joints? YES -    17. Are you allergic to latex? No   18. Is there any chance that you may be pregnant? -       Health Care Directive:  Patient does not have a Health Care Directive or Living Will:    Preoperative Review of :   reviewed - no record of controlled substances prescribed.      Status of Chronic Conditions:  See problem list for active medical problems.  Problems all longstanding and stable, except as noted/documented.  See ROS for pertinent symptoms related to these conditions.      Review of Systems  CONSTITUTIONAL: NEGATIVE for fever, chills, change in weight  INTEGUMENTARY/SKIN: NEGATIVE for worrisome rashes, moles or lesions  EYES: NEGATIVE for vision changes or irritation  ENT/MOUTH: NEGATIVE for ear, mouth and throat problems  RESP: NEGATIVE for significant cough or SOB  BREAST: NEGATIVE for masses, tenderness or discharge  CV: NEGATIVE for chest pain, palpitations or peripheral edema  GI: NEGATIVE for nausea, abdominal pain, heartburn, or change  in bowel habits  : NEGATIVE for frequency, dysuria, or hematuria  MUSCULOSKELETAL: NEGATIVE for significant arthralgias or myalgia  NEURO: NEGATIVE for weakness, dizziness or paresthesias  ENDOCRINE: NEGATIVE for temperature intolerance, skin/hair changes  HEME: NEGATIVE for bleeding problems  PSYCHIATRIC: NEGATIVE for changes in mood or affect    Patient Active Problem List    Diagnosis Date Noted     S/P total knee arthroplasty 09/08/2020     Priority: Medium     S/P total hip arthroplasty 03/17/2020     Priority: Medium     Glaucoma suspect, bilateral 11/28/2018     Priority: Medium     Right knee pain, unspecified chronicity 05/08/2017     Priority: Medium     Fatigue, unspecified type 11/07/2016     Priority: Medium     Anemia, unspecified type 11/07/2016     Priority: Medium     RECENT EPISODE OF DIVERTICULITIS       Numerous moles 08/04/2016     Priority: Medium     Myalgia  01/07/2016     Priority: Medium     Restless leg syndrome 01/07/2016     Priority: Medium     Overweight (BMI 25.0-29.9) 01/07/2016     Priority: Medium     Adult BMI 28.0-28.9 kg/sq m 10/01/2015     Priority: Medium     Advanced directives, counseling/discussion 01/20/2015     Priority: Medium     Advance Care Planning:   ACP Review and Resources Provided:  Reviewed chart for advance care plan.  Justine Radha Miguel Angel has no plan or code status on file. Discussed available resources and provided with information. Confirmed code status reflects current choices pending further ACP discussions.  Confirmed/documented designated decision maker(s). See permanent comments section of demographics in clinical tab.   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 Provider to review and confirm.       Vitamin B12  deficiency without anemia 10/28/2014     Priority: Medium     Diagnosis updated by automated process. Provider to review and confirm.       Senile cataract 10/28/2014     Priority: Medium     Elevated ferritin level 10/28/2014     Priority: Medium     Vitamin D deficiency 10/28/2014     Priority: Medium     Palpitations 06/03/2014     Priority: Medium     Acute abdominal pain 04/16/2013     Priority: Medium     Diverticulitis of colon 04/16/2013     Priority: Medium     Myalgia and myositis 09/22/2006     Priority: Medium     Problem list name updated by automated process. Provider to review       Diverticulosis of large intestine      Priority: Medium     colonoscopy due in 2013  Problem list name updated by automated process. Provider to review       Absence of menstruation      Priority: Medium     not on HRT        Past Medical History:   Diagnosis Date     Absence of menstruation 2006    not on HRT     Acute abdominal pain 4/16/2013     Diverticulitis of colon 4/16/2013    also 2003, 2007     Diverticulosis of colon (without mention of hemorrhage)     colonoscopy due in 2013     Myalgia and myositis, unspecified      Obesity, unspecified      Other and unspecified hyperlipidemia      Pain in right knee      Palpitations      Past Surgical History:   Procedure Laterality Date     APPENDECTOMY       ARTHROPLASTY HIP Left 3/17/2020    Procedure: Left total hip arthroplasty using a Biomet G7 acetabulum and Taperloc femoral stem;  Surgeon: Bulmaro Douglass MD;  Location: [facility] OR     ARTHROPLASTY KNEE Right 9/8/2020    Procedure: Right total knee arthroplasty using a Tha NexGen total knee system;  Surgeon: Bulmaro Douglass MD;  Location: [facility] OR     CHOLECYSTECTOMY, LAPOROSCOPIC      Cholecystectomy, Laparoscopic     excision of cyst right middle & little finger       [facility] COLONOSCOPY THRU STOMA, DIAGNOSTIC  2000    [place]     ORTHOPEDIC SURGERY Left 1991    [facility]     TUBAL LIGATION       [facility]  "NONSPECIFIC PROCEDURE  2006    [facility] NONSPECIFIC PROCEDURE  2005    nl     Current Outpatient Medications   Medication Sig Dispense Refill     calcium carbonate 750 MG CHEW Take 1,500 mg by mouth daily        Cyanocobalamin (B-12 SUPER STRENGTH) 5000 MCG/ML LIQD Place 0.5 mLs under the tongue daily FOR VITAMIN B12 SUPPLEMENTATION, PLEASE PLACE UNDER THE TONGUE 2 Bottle PRN     FIBER PO Take 1 tablet by mouth daily as needed        latanoprost (XALATAN) 0.005 % ophthalmic solution Place 1 drop into both eyes daily        vitamin C (ASCORBIC ACID) 500 MG tablet Take 1,000 mg by mouth daily       vitamin D3 (CHOLECALCIFEROL) 25 mcg (18261 units) capsule Take 2 capsules by mouth every 7 days         Allergies   Allergen Reactions     Amoxicillin      Augmentin Rash     Bees      hives        Social History     Tobacco Use     Smoking status: Never Smoker     Smokeless tobacco: Never Used   Substance Use Topics     Alcohol use: Yes     Comment: 2 glasses of wine per week     Family History   Problem Relation Age of Onset     Heart Disease Father         CHS     Thyroid Disease Mother      Cancer Mother         skin     Cardiovascular Mother      C.A.D. Paternal Grandmother      Diabetes Maternal Grandfather      Hypertension Paternal Grandfather      Hypertension Brother      Osteoarthritis Brother      Cerebrovascular Disease Brother      No Known Problems Son      History   Drug Use No         Objective     BP (!) 148/88 (BP Location: Right arm, Patient Position: Sitting, Cuff Size: Adult Large)   Pulse 75   Temp 97.8  F (36.6  C) (Oral)   Ht 1.702 m (5' 7\")   Wt 90.7 kg (200 lb)   LMP  (LMP Unknown)   SpO2 98%   BMI 31.32 kg/m      Physical Exam    GENERAL APPEARANCE: healthy, alert and no distress     EYES: EOMI, PERRL     HENT: ear canals and TM's normal and nose and mouth without ulcers or lesions     NECK: no adenopathy, no asymmetry, masses, or scars and thyroid normal to palpation     RESP: lungs " clear to auscultation - no rales, rhonchi or wheezes     CV: regular rates and rhythm, normal S1 S2, no S3 or S4 and no murmur, click or rub     ABDOMEN:  soft, nontender, no HSM or masses and bowel sounds normal     MS: extremities normal- no gross deformities noted, no evidence of inflammation in joints, FROM in all extremities.     SKIN: no suspicious lesions or rashes     NEURO: Normal strength and tone, sensory exam grossly normal, mentation intact and speech normal     PSYCH: mentation appears normal. and affect normal/bright     LYMPHATICS: No cervical adenopathy    Recent Labs   Lab Test 09/09/20  0730 09/08/20  1903 08/31/20  1145 01/06/20  1536 01/06/20  1536   HGB 10.8*  --  12.8   < > 13.1   PLT  --  177 195   < > 253   NA  --   --  140  --  139   POTASSIUM  --   --  3.9  --  3.7   CR  --  0.68 0.70   < > 0.74    < > = values in this interval not displayed.        Diagnostics:  Labs pending at this time.  Results will be reviewed when available.   EKG: appears normal, NSR, normal axis, normal intervals, no acute ST/T changes c/w ischemia, no LVH by voltage criteria, unchanged from previous tracings    Revised Cardiac Risk Index (RCRI):  The patient has the following serious cardiovascular risks for perioperative complications:   - No serious cardiac risks = 0 points     RCRI Interpretation: 0 points: Class I (very low risk - 0.4% complication rate)             Signed Electronically by: Corey Ambrose MD  Copy of this evaluation report is provided to requesting physician.    [facility] Guidelines    Revised Cardiac Risk Index

## 2021-02-08 NOTE — LETTER
February 9, 2021      Justine Radha Miguel Angel  [address] [place] [address]        Dear ,    We are writing to inform you of your test results.    Your test results fall within the expected range(s) or remain unchanged from previous results.  Please continue with current treatment plan.    Resulted Orders   Hemoglobin   Result Value Ref Range    Hemoglobin 12.7 11.7 - 15.7 g/dL       If you have any questions or concerns, please call the clinic at the number listed above.       Sincerely,      Corey Ambrose MD

## 2021-02-15 ENCOUNTER — HOSPITAL ENCOUNTER (OUTPATIENT)
Dept: LAB | Facility: CLINIC | Age: 76
Discharge: HOME | End: 2021-02-15
Attending: OBSTETRICS & GYNECOLOGY | Admitting: OBSTETRICS & GYNECOLOGY
Payer: COMMERCIAL

## 2021-02-15 DIAGNOSIS — Z11.59 ENCOUNTER FOR SCREENING FOR OTHER VIRAL DISEASES: ICD-10-CM

## 2021-02-15 LAB
SARS-COV-2 RNA RESP QL NAA+PROBE: NORMAL
SPECIMEN SOURCE: NORMAL

## 2021-02-15 PROCEDURE — U0005 INFEC AGEN DETEC AMPLI PROBE: HCPCS | Performed by: OBSTETRICS & GYNECOLOGY

## 2021-02-15 PROCEDURE — U0003 INFECTIOUS AGENT DETECTION BY NUCLEIC ACID (DNA OR RNA); SEVERE ACUTE RESPIRATORY SYNDROME CORONAVIRUS 2 (SARS-COV-2) (CORONAVIRUS DISEASE [COVID-19]), AMPLIFIED PROBE TECHNIQUE, MAKING USE OF HIGH THROUGHPUT TECHNOLOGIES AS DESCRIBED BY CMS-2020-01-R: HCPCS | Performed by: OBSTETRICS & GYNECOLOGY

## 2021-02-16 LAB
LABORATORY COMMENT REPORT: NORMAL
SARS-COV-2 RNA RESP QL NAA+PROBE: NEGATIVE
SPECIMEN SOURCE: NORMAL

## 2021-02-17 NOTE — PROGRESS NOTES
PTA medications updated by Medication Scribe prior to surgery via phone call with patient        Comments:    Medication history sources: Patient, Surescripts and H&P  Medication history source reliability: Good  Adherence assessment: N/A Not Observed    Significant changes made to the medication list:  None      Additional medication history information:   None        Prior to Admission medications    Medication Sig Last Dose Taking? Auth Provider   calcium carbonate 750 MG CHEW Take 1,500 mg by mouth daily  2/17/2021 at AM Yes Unknown, Entered By History   Cyanocobalamin (B-12 SUPER STRENGTH) 5000 MCG/ML LIQD Place 0.5 mLs under the tongue daily FOR VITAMIN B12 SUPPLEMENTATION, PLEASE PLACE UNDER THE TONGUE 2/17/2021 at AM Yes Adriana Hernandez MD   FIBER PO Take 1 tablet by mouth daily as needed  MORE THAN 1 WEEK at PRN Yes Reported, Patient   latanoprost (XALATAN) 0.005 % ophthalmic solution Place 1 drop into both eyes At Bedtime   at HS Yes Reported, Patient   vitamin C (ASCORBIC ACID) 500 MG tablet Take 1,000 mg by mouth daily 2/17/2021 at AM Yes Reported, Patient   vitamin D3 (CHOLECALCIFEROL) 25 mcg (18755 units) capsule Take 2 capsules by mouth every 7 days On Sundays 2/14/2021 Yes Reported, Patient

## 2021-02-18 ENCOUNTER — ANESTHESIA EVENT (OUTPATIENT)
Dept: SURGERY | Facility: CLINIC | Age: 76
End: 2021-02-18
Payer: COMMERCIAL

## 2021-02-18 ENCOUNTER — ANESTHESIA (OUTPATIENT)
Dept: SURGERY | Facility: CLINIC | Age: 76
End: 2021-02-18
Payer: COMMERCIAL

## 2021-02-18 ENCOUNTER — HOSPITAL ENCOUNTER (OUTPATIENT)
Facility: CLINIC | Age: 76
Discharge: HOME | End: 2021-02-19
Attending: OBSTETRICS & GYNECOLOGY | Admitting: OBSTETRICS & GYNECOLOGY
Payer: COMMERCIAL

## 2021-02-18 DIAGNOSIS — N81.2 CYSTOCELE WITH INCOMPLETE UTEROVAGINAL PROLAPSE: ICD-10-CM

## 2021-02-18 DIAGNOSIS — N81.2 UTEROVAGINAL PROLAPSE, INCOMPLETE: Primary | ICD-10-CM

## 2021-02-18 LAB
ABO + RH BLD: NORMAL
ABO + RH BLD: NORMAL
B-HCG SERPL-ACNC: NORMAL IU/L (ref 0–5)
BLD GP AB SCN SERPL QL: NORMAL
BLOOD BANK CMNT PATIENT-IMP: NORMAL
HGB BLD-MCNC: 12.9 G/DL (ref 11.7–15.7)
SPECIMEN EXP DATE BLD: NORMAL

## 2021-02-18 PROCEDURE — 370N000017 HC ANESTHESIA TECHNICAL FEE, PER MIN: Performed by: OBSTETRICS & GYNECOLOGY

## 2021-02-18 PROCEDURE — 258N000003 HC RX IP 258 OP 636: Performed by: REGISTERED NURSE

## 2021-02-18 PROCEDURE — 250N000025 HC SEVOFLURANE, PER MIN: Performed by: OBSTETRICS & GYNECOLOGY

## 2021-02-18 PROCEDURE — 999N000141 HC STATISTIC PRE-PROCEDURE NURSING ASSESSMENT: Performed by: OBSTETRICS & GYNECOLOGY

## 2021-02-18 PROCEDURE — 250N000011 HC RX IP 250 OP 636: Performed by: ANESTHESIOLOGY

## 2021-02-18 PROCEDURE — 88307 TISSUE EXAM BY PATHOLOGIST: CPT | Mod: TC | Performed by: OBSTETRICS & GYNECOLOGY

## 2021-02-18 PROCEDURE — 36415 COLL VENOUS BLD VENIPUNCTURE: CPT | Performed by: PHYSICIAN ASSISTANT

## 2021-02-18 PROCEDURE — 250N000013 HC RX MED GY IP 250 OP 250 PS 637: Performed by: OBSTETRICS & GYNECOLOGY

## 2021-02-18 PROCEDURE — C1781 MESH (IMPLANTABLE): HCPCS | Performed by: OBSTETRICS & GYNECOLOGY

## 2021-02-18 PROCEDURE — 86850 RBC ANTIBODY SCREEN: CPT | Performed by: PHYSICIAN ASSISTANT

## 2021-02-18 PROCEDURE — 86901 BLOOD TYPING SEROLOGIC RH(D): CPT | Performed by: PHYSICIAN ASSISTANT

## 2021-02-18 PROCEDURE — 250N000009 HC RX 250: Performed by: PHYSICIAN ASSISTANT

## 2021-02-18 PROCEDURE — 250N000011 HC RX IP 250 OP 636: Performed by: OBSTETRICS & GYNECOLOGY

## 2021-02-18 PROCEDURE — 85018 HEMOGLOBIN: CPT | Performed by: PHYSICIAN ASSISTANT

## 2021-02-18 PROCEDURE — 250N000009 HC RX 250: Performed by: REGISTERED NURSE

## 2021-02-18 PROCEDURE — 250N000011 HC RX IP 250 OP 636: Performed by: REGISTERED NURSE

## 2021-02-18 PROCEDURE — 250N000011 HC RX IP 250 OP 636: Performed by: PHYSICIAN ASSISTANT

## 2021-02-18 PROCEDURE — 250N000009 HC RX 250: Performed by: OBSTETRICS & GYNECOLOGY

## 2021-02-18 PROCEDURE — 258N000003 HC RX IP 258 OP 636: Performed by: SURGERY

## 2021-02-18 PROCEDURE — 710N000009 HC RECOVERY PHASE 1, LEVEL 1, PER MIN: Performed by: OBSTETRICS & GYNECOLOGY

## 2021-02-18 PROCEDURE — 258N000003 HC RX IP 258 OP 636: Performed by: OBSTETRICS & GYNECOLOGY

## 2021-02-18 PROCEDURE — 360N000080 HC SURGERY LEVEL 7, PER MIN: Performed by: OBSTETRICS & GYNECOLOGY

## 2021-02-18 PROCEDURE — 272N000001 HC OR GENERAL SUPPLY STERILE: Performed by: OBSTETRICS & GYNECOLOGY

## 2021-02-18 PROCEDURE — 88307 TISSUE EXAM BY PATHOLOGIST: CPT | Mod: 26 | Performed by: PATHOLOGY

## 2021-02-18 PROCEDURE — 86900 BLOOD TYPING SEROLOGIC ABO: CPT | Performed by: PHYSICIAN ASSISTANT

## 2021-02-18 PROCEDURE — 258N000003 HC RX IP 258 OP 636: Performed by: PHYSICIAN ASSISTANT

## 2021-02-18 DEVICE — MESH SLING ARTISYN SACROCOLPOPEXY ARTY5L: Type: IMPLANTABLE DEVICE | Status: FUNCTIONAL

## 2021-02-18 RX ORDER — OXYCODONE AND ACETAMINOPHEN 5; 325 MG/1; MG/1
1-2 TABLET ORAL EVERY 4 HOURS PRN
Qty: 12 TABLET | Refills: 0 | Status: SHIPPED | OUTPATIENT
Start: 2021-02-18 | End: 2021-03-23

## 2021-02-18 RX ORDER — SODIUM CHLORIDE, SODIUM LACTATE, POTASSIUM CHLORIDE, CALCIUM CHLORIDE 600; 310; 30; 20 MG/100ML; MG/100ML; MG/100ML; MG/100ML
INJECTION, SOLUTION INTRAVENOUS CONTINUOUS
Status: DISCONTINUED | OUTPATIENT
Start: 2021-02-18 | End: 2021-02-18 | Stop reason: HOSPADM

## 2021-02-18 RX ORDER — KETOROLAC TROMETHAMINE 15 MG/ML
15 INJECTION, SOLUTION INTRAMUSCULAR; INTRAVENOUS EVERY 6 HOURS
Status: COMPLETED | OUTPATIENT
Start: 2021-02-18 | End: 2021-02-19

## 2021-02-18 RX ORDER — ACETAMINOPHEN 325 MG/1
650 TABLET ORAL EVERY 4 HOURS PRN
Qty: 100 TABLET | Refills: 0 | Status: SHIPPED | OUTPATIENT
Start: 2021-02-18 | End: 2021-03-23

## 2021-02-18 RX ORDER — NALOXONE HYDROCHLORIDE 0.4 MG/ML
0.2 INJECTION, SOLUTION INTRAMUSCULAR; INTRAVENOUS; SUBCUTANEOUS
Status: ACTIVE | OUTPATIENT
Start: 2021-02-18 | End: 2021-02-19

## 2021-02-18 RX ORDER — PROPOFOL 10 MG/ML
INJECTION, EMULSION INTRAVENOUS PRN
Status: DISCONTINUED | OUTPATIENT
Start: 2021-02-18 | End: 2021-02-18

## 2021-02-18 RX ORDER — METOPROLOL TARTRATE 1 MG/ML
1-2 INJECTION, SOLUTION INTRAVENOUS EVERY 5 MIN PRN
Status: DISCONTINUED | OUTPATIENT
Start: 2021-02-18 | End: 2021-02-18 | Stop reason: HOSPADM

## 2021-02-18 RX ORDER — HYDROMORPHONE HYDROCHLORIDE 1 MG/ML
.3-.5 INJECTION, SOLUTION INTRAMUSCULAR; INTRAVENOUS; SUBCUTANEOUS EVERY 5 MIN PRN
Status: DISCONTINUED | OUTPATIENT
Start: 2021-02-18 | End: 2021-02-18 | Stop reason: HOSPADM

## 2021-02-18 RX ORDER — OXYCODONE AND ACETAMINOPHEN 5; 325 MG/1; MG/1
1-2 TABLET ORAL EVERY 4 HOURS PRN
Status: DISCONTINUED | OUTPATIENT
Start: 2021-02-18 | End: 2021-02-19 | Stop reason: HOSPADM

## 2021-02-18 RX ORDER — ALBUTEROL SULFATE 0.83 MG/ML
2.5 SOLUTION RESPIRATORY (INHALATION) EVERY 4 HOURS PRN
Status: DISCONTINUED | OUTPATIENT
Start: 2021-02-18 | End: 2021-02-18 | Stop reason: HOSPADM

## 2021-02-18 RX ORDER — ACETAMINOPHEN 325 MG/1
650 TABLET ORAL EVERY 6 HOURS PRN
Status: DISCONTINUED | OUTPATIENT
Start: 2021-02-18 | End: 2021-02-19 | Stop reason: HOSPADM

## 2021-02-18 RX ORDER — MEPERIDINE HYDROCHLORIDE 25 MG/ML
12.5 INJECTION INTRAMUSCULAR; INTRAVENOUS; SUBCUTANEOUS EVERY 5 MIN PRN
Status: DISCONTINUED | OUTPATIENT
Start: 2021-02-18 | End: 2021-02-18 | Stop reason: HOSPADM

## 2021-02-18 RX ORDER — NALOXONE HYDROCHLORIDE 0.4 MG/ML
0.4 INJECTION, SOLUTION INTRAMUSCULAR; INTRAVENOUS; SUBCUTANEOUS
Status: ACTIVE | OUTPATIENT
Start: 2021-02-18 | End: 2021-02-19

## 2021-02-18 RX ORDER — FENTANYL CITRATE 50 UG/ML
25-50 INJECTION, SOLUTION INTRAMUSCULAR; INTRAVENOUS
Status: DISCONTINUED | OUTPATIENT
Start: 2021-02-18 | End: 2021-02-18 | Stop reason: HOSPADM

## 2021-02-18 RX ORDER — CLINDAMYCIN PHOSPHATE 900 MG/50ML
900 INJECTION, SOLUTION INTRAVENOUS
Status: DISCONTINUED | OUTPATIENT
Start: 2021-02-18 | End: 2021-02-18 | Stop reason: HOSPADM

## 2021-02-18 RX ORDER — LORAZEPAM 2 MG/ML
.5-1 INJECTION INTRAMUSCULAR
Status: DISCONTINUED | OUTPATIENT
Start: 2021-02-18 | End: 2021-02-18 | Stop reason: HOSPADM

## 2021-02-18 RX ORDER — ONDANSETRON 2 MG/ML
INJECTION INTRAMUSCULAR; INTRAVENOUS PRN
Status: DISCONTINUED | OUTPATIENT
Start: 2021-02-18 | End: 2021-02-18

## 2021-02-18 RX ORDER — LIDOCAINE HYDROCHLORIDE 20 MG/ML
INJECTION, SOLUTION INFILTRATION; PERINEURAL PRN
Status: DISCONTINUED | OUTPATIENT
Start: 2021-02-18 | End: 2021-02-18

## 2021-02-18 RX ORDER — NEOSTIGMINE METHYLSULFATE 1 MG/ML
VIAL (ML) INJECTION PRN
Status: DISCONTINUED | OUTPATIENT
Start: 2021-02-18 | End: 2021-02-18

## 2021-02-18 RX ORDER — FENTANYL CITRATE 50 UG/ML
INJECTION, SOLUTION INTRAMUSCULAR; INTRAVENOUS PRN
Status: DISCONTINUED | OUTPATIENT
Start: 2021-02-18 | End: 2021-02-18

## 2021-02-18 RX ORDER — LIDOCAINE 40 MG/G
CREAM TOPICAL
Status: DISCONTINUED | OUTPATIENT
Start: 2021-02-18 | End: 2021-02-18 | Stop reason: HOSPADM

## 2021-02-18 RX ORDER — HYDROXYZINE HYDROCHLORIDE 10 MG/1
10 TABLET, FILM COATED ORAL EVERY 6 HOURS PRN
Status: DISCONTINUED | OUTPATIENT
Start: 2021-02-18 | End: 2021-02-19 | Stop reason: HOSPADM

## 2021-02-18 RX ORDER — ONDANSETRON 4 MG/1
4 TABLET, ORALLY DISINTEGRATING ORAL EVERY 6 HOURS PRN
Status: DISCONTINUED | OUTPATIENT
Start: 2021-02-18 | End: 2021-02-19 | Stop reason: HOSPADM

## 2021-02-18 RX ORDER — DEXAMETHASONE SODIUM PHOSPHATE 4 MG/ML
4 INJECTION, SOLUTION INTRA-ARTICULAR; INTRALESIONAL; INTRAMUSCULAR; INTRAVENOUS; SOFT TISSUE
Status: DISCONTINUED | OUTPATIENT
Start: 2021-02-18 | End: 2021-02-18 | Stop reason: HOSPADM

## 2021-02-18 RX ORDER — ONDANSETRON 4 MG/1
4 TABLET, ORALLY DISINTEGRATING ORAL EVERY 30 MIN PRN
Status: DISCONTINUED | OUTPATIENT
Start: 2021-02-18 | End: 2021-02-18 | Stop reason: HOSPADM

## 2021-02-18 RX ORDER — METOCLOPRAMIDE 5 MG/1
5 TABLET ORAL EVERY 6 HOURS PRN
Status: DISCONTINUED | OUTPATIENT
Start: 2021-02-18 | End: 2021-02-19 | Stop reason: HOSPADM

## 2021-02-18 RX ORDER — ONDANSETRON 2 MG/ML
4 INJECTION INTRAMUSCULAR; INTRAVENOUS EVERY 6 HOURS PRN
Status: DISCONTINUED | OUTPATIENT
Start: 2021-02-18 | End: 2021-02-19 | Stop reason: HOSPADM

## 2021-02-18 RX ORDER — DEXAMETHASONE SODIUM PHOSPHATE 4 MG/ML
INJECTION, SOLUTION INTRA-ARTICULAR; INTRALESIONAL; INTRAMUSCULAR; INTRAVENOUS; SOFT TISSUE PRN
Status: DISCONTINUED | OUTPATIENT
Start: 2021-02-18 | End: 2021-02-18

## 2021-02-18 RX ORDER — BUPIVACAINE HYDROCHLORIDE AND EPINEPHRINE 2.5; 5 MG/ML; UG/ML
INJECTION, SOLUTION EPIDURAL; INFILTRATION; INTRACAUDAL; PERINEURAL PRN
Status: DISCONTINUED | OUTPATIENT
Start: 2021-02-18 | End: 2021-02-18 | Stop reason: HOSPADM

## 2021-02-18 RX ORDER — HYDRALAZINE HYDROCHLORIDE 20 MG/ML
2.5-5 INJECTION INTRAMUSCULAR; INTRAVENOUS EVERY 10 MIN PRN
Status: DISCONTINUED | OUTPATIENT
Start: 2021-02-18 | End: 2021-02-18 | Stop reason: HOSPADM

## 2021-02-18 RX ORDER — METOCLOPRAMIDE HYDROCHLORIDE 5 MG/ML
5 INJECTION INTRAMUSCULAR; INTRAVENOUS EVERY 6 HOURS PRN
Status: DISCONTINUED | OUTPATIENT
Start: 2021-02-18 | End: 2021-02-19 | Stop reason: HOSPADM

## 2021-02-18 RX ORDER — HYDROMORPHONE HYDROCHLORIDE 1 MG/ML
0.2 INJECTION, SOLUTION INTRAMUSCULAR; INTRAVENOUS; SUBCUTANEOUS
Status: DISCONTINUED | OUTPATIENT
Start: 2021-02-18 | End: 2021-02-19 | Stop reason: HOSPADM

## 2021-02-18 RX ORDER — FAMOTIDINE 20 MG/1
20 TABLET, FILM COATED ORAL 2 TIMES DAILY
Status: DISCONTINUED | OUTPATIENT
Start: 2021-02-18 | End: 2021-02-19 | Stop reason: HOSPADM

## 2021-02-18 RX ORDER — SODIUM CHLORIDE, SODIUM LACTATE, POTASSIUM CHLORIDE, CALCIUM CHLORIDE 600; 310; 30; 20 MG/100ML; MG/100ML; MG/100ML; MG/100ML
INJECTION, SOLUTION INTRAVENOUS CONTINUOUS
Status: DISCONTINUED | OUTPATIENT
Start: 2021-02-18 | End: 2021-02-19 | Stop reason: HOSPADM

## 2021-02-18 RX ORDER — SODIUM CHLORIDE, SODIUM LACTATE, POTASSIUM CHLORIDE, CALCIUM CHLORIDE 600; 310; 30; 20 MG/100ML; MG/100ML; MG/100ML; MG/100ML
INJECTION, SOLUTION INTRAVENOUS CONTINUOUS PRN
Status: DISCONTINUED | OUTPATIENT
Start: 2021-02-18 | End: 2021-02-18

## 2021-02-18 RX ORDER — IBUPROFEN 600 MG/1
600 TABLET, FILM COATED ORAL EVERY 6 HOURS PRN
Status: DISCONTINUED | OUTPATIENT
Start: 2021-02-19 | End: 2021-02-19 | Stop reason: HOSPADM

## 2021-02-18 RX ORDER — CLINDAMYCIN PHOSPHATE 900 MG/50ML
900 INJECTION, SOLUTION INTRAVENOUS SEE ADMIN INSTRUCTIONS
Status: DISCONTINUED | OUTPATIENT
Start: 2021-02-18 | End: 2021-02-18 | Stop reason: HOSPADM

## 2021-02-18 RX ORDER — ONDANSETRON 2 MG/ML
4 INJECTION INTRAMUSCULAR; INTRAVENOUS EVERY 30 MIN PRN
Status: DISCONTINUED | OUTPATIENT
Start: 2021-02-18 | End: 2021-02-18 | Stop reason: HOSPADM

## 2021-02-18 RX ORDER — PROPOFOL 10 MG/ML
INJECTION, EMULSION INTRAVENOUS CONTINUOUS PRN
Status: DISCONTINUED | OUTPATIENT
Start: 2021-02-18 | End: 2021-02-18

## 2021-02-18 RX ORDER — GLYCOPYRROLATE 0.2 MG/ML
INJECTION, SOLUTION INTRAMUSCULAR; INTRAVENOUS PRN
Status: DISCONTINUED | OUTPATIENT
Start: 2021-02-18 | End: 2021-02-18

## 2021-02-18 RX ORDER — LIDOCAINE 40 MG/G
CREAM TOPICAL
Status: DISCONTINUED | OUTPATIENT
Start: 2021-02-18 | End: 2021-02-19 | Stop reason: HOSPADM

## 2021-02-18 RX ORDER — OXYCODONE HCL 5 MG/5 ML
5 SOLUTION, ORAL ORAL EVERY 4 HOURS PRN
Status: DISCONTINUED | OUTPATIENT
Start: 2021-02-18 | End: 2021-02-18

## 2021-02-18 RX ORDER — PROCHLORPERAZINE MALEATE 5 MG
5 TABLET ORAL EVERY 6 HOURS PRN
Status: DISCONTINUED | OUTPATIENT
Start: 2021-02-18 | End: 2021-02-19 | Stop reason: HOSPADM

## 2021-02-18 RX ORDER — FUROSEMIDE 10 MG/ML
INJECTION INTRAMUSCULAR; INTRAVENOUS PRN
Status: DISCONTINUED | OUTPATIENT
Start: 2021-02-18 | End: 2021-02-18

## 2021-02-18 RX ORDER — IBUPROFEN 600 MG/1
600 TABLET, FILM COATED ORAL EVERY 6 HOURS PRN
Qty: 30 TABLET | Refills: 0 | Status: SHIPPED | OUTPATIENT
Start: 2021-02-18

## 2021-02-18 RX ADMIN — SODIUM CHLORIDE, POTASSIUM CHLORIDE, SODIUM LACTATE AND CALCIUM CHLORIDE: 600; 310; 30; 20 INJECTION, SOLUTION INTRAVENOUS at 06:46

## 2021-02-18 RX ADMIN — OXYCODONE HYDROCHLORIDE AND ACETAMINOPHEN 2 TABLET: 5; 325 TABLET ORAL at 15:48

## 2021-02-18 RX ADMIN — ONDANSETRON 4 MG: 2 INJECTION INTRAMUSCULAR; INTRAVENOUS at 18:18

## 2021-02-18 RX ADMIN — HYDROMORPHONE HYDROCHLORIDE 0.5 MG: 1 INJECTION, SOLUTION INTRAMUSCULAR; INTRAVENOUS; SUBCUTANEOUS at 09:49

## 2021-02-18 RX ADMIN — NEOSTIGMINE METHYLSULFATE 4 MG: 1 INJECTION, SOLUTION INTRAVENOUS at 09:36

## 2021-02-18 RX ADMIN — GLYCOPYRROLATE 0.6 MG: 0.2 INJECTION, SOLUTION INTRAMUSCULAR; INTRAVENOUS at 09:38

## 2021-02-18 RX ADMIN — SODIUM CHLORIDE, POTASSIUM CHLORIDE, SODIUM LACTATE AND CALCIUM CHLORIDE: 600; 310; 30; 20 INJECTION, SOLUTION INTRAVENOUS at 07:35

## 2021-02-18 RX ADMIN — KETOROLAC TROMETHAMINE 15 MG: 15 INJECTION, SOLUTION INTRAMUSCULAR; INTRAVENOUS at 17:11

## 2021-02-18 RX ADMIN — DEXAMETHASONE SODIUM PHOSPHATE 4 MG: 4 INJECTION, SOLUTION INTRA-ARTICULAR; INTRALESIONAL; INTRAMUSCULAR; INTRAVENOUS; SOFT TISSUE at 07:48

## 2021-02-18 RX ADMIN — GLYCOPYRROLATE 0.2 MG: 0.2 INJECTION, SOLUTION INTRAMUSCULAR; INTRAVENOUS at 08:11

## 2021-02-18 RX ADMIN — HYDROMORPHONE HYDROCHLORIDE 0.5 MG: 1 INJECTION, SOLUTION INTRAMUSCULAR; INTRAVENOUS; SUBCUTANEOUS at 08:02

## 2021-02-18 RX ADMIN — PROPOFOL 200 MG: 10 INJECTION, EMULSION INTRAVENOUS at 07:30

## 2021-02-18 RX ADMIN — FENTANYL CITRATE 25 MCG: 0.05 INJECTION, SOLUTION INTRAMUSCULAR; INTRAVENOUS at 11:01

## 2021-02-18 RX ADMIN — KETOROLAC TROMETHAMINE 15 MG: 15 INJECTION, SOLUTION INTRAMUSCULAR; INTRAVENOUS at 10:44

## 2021-02-18 RX ADMIN — GENTAMICIN SULFATE 180 MG: 40 INJECTION, SOLUTION INTRAMUSCULAR; INTRAVENOUS at 07:46

## 2021-02-18 RX ADMIN — ROCURONIUM BROMIDE 30 MG: 10 INJECTION INTRAVENOUS at 08:19

## 2021-02-18 RX ADMIN — FAMOTIDINE 20 MG: 10 INJECTION, SOLUTION INTRAVENOUS at 20:24

## 2021-02-18 RX ADMIN — ONDANSETRON 4 MG: 2 INJECTION INTRAMUSCULAR; INTRAVENOUS at 09:24

## 2021-02-18 RX ADMIN — FENTANYL CITRATE 100 MCG: 50 INJECTION, SOLUTION INTRAMUSCULAR; INTRAVENOUS at 07:30

## 2021-02-18 RX ADMIN — HYDROMORPHONE HYDROCHLORIDE 0.2 MG: 1 INJECTION, SOLUTION INTRAMUSCULAR; INTRAVENOUS; SUBCUTANEOUS at 12:59

## 2021-02-18 RX ADMIN — KETOROLAC TROMETHAMINE 15 MG: 15 INJECTION, SOLUTION INTRAMUSCULAR; INTRAVENOUS at 23:08

## 2021-02-18 RX ADMIN — SODIUM CHLORIDE, POTASSIUM CHLORIDE, SODIUM LACTATE AND CALCIUM CHLORIDE: 600; 310; 30; 20 INJECTION, SOLUTION INTRAVENOUS at 13:22

## 2021-02-18 RX ADMIN — ROCURONIUM BROMIDE 20 MG: 10 INJECTION INTRAVENOUS at 07:58

## 2021-02-18 RX ADMIN — LIDOCAINE HYDROCHLORIDE 60 MG: 20 INJECTION, SOLUTION INFILTRATION; PERINEURAL at 07:30

## 2021-02-18 RX ADMIN — DEXMEDETOMIDINE HYDROCHLORIDE 0.4 MCG/KG/HR: 100 INJECTION, SOLUTION INTRAVENOUS at 07:35

## 2021-02-18 RX ADMIN — ROCURONIUM BROMIDE 50 MG: 10 INJECTION INTRAVENOUS at 07:32

## 2021-02-18 RX ADMIN — CLINDAMYCIN PHOSPHATE 900 MG: 900 INJECTION, SOLUTION INTRAVENOUS at 07:44

## 2021-02-18 RX ADMIN — FAMOTIDINE 20 MG: 10 INJECTION, SOLUTION INTRAVENOUS at 12:58

## 2021-02-18 RX ADMIN — FUROSEMIDE 10 MG: 10 INJECTION, SOLUTION INTRAVENOUS at 09:30

## 2021-02-18 RX ADMIN — PROPOFOL 150 MCG/KG/MIN: 10 INJECTION, EMULSION INTRAVENOUS at 07:35

## 2021-02-18 ASSESSMENT — MIFFLIN-ST. JEOR: SCORE: 1443.9

## 2021-02-18 NOTE — ANESTHESIA CARE TRANSFER NOTE
Patient: Justine Michelle    Procedure(s):  ROBOTIC-ASSISTED SUPRACERVICAL HYSTERECTOMY, BILATERAL SALPINGO-OOPHORECTOMY, LYSIS OF ADHESSIONS  SACROCOLPOPEXY, CYSTOSCOPY, PERINEAL REPAIR, CYSTOCELE REPAIR  Davinci Lysis of Adhesions    Diagnosis: Female stress incontinence [N39.3]  Uterovaginal prolapse, incomplete [N81.2]  Diagnosis Additional Information: No value filed.    Anesthesia Type:   General     Note:    Oropharynx: spontaneously breathing  Level of Consciousness: awake and drowsy  Oxygen Supplementation: face mask  Level of Supplemental Oxygen (L/min / FiO2): 6  Independent Airway: airway patency satisfactory and stable  Dentition: dentition unchanged  Vital Signs Stable: post-procedure vital signs reviewed and stable  Report to RN Given: handoff report given  Patient transferred to: PACU  Comments: Neuromuscular blockade reversed after TOF 4/4, spontaneous respirations, adequate tidal volumes, followed commands to voice, oropharynx suctioned with soft flexible catheter, extubated with suction, airway patent after extubation.  Oxygen via facemask at 6 liters per minute to PACU. After extubation, patient remained in OR for 14 minutes until transport to PACU due to standard hospital COVID-19 precautions, Oxygen tubing connected to wall O2 in PACU, Iris Hugger warmer connected to patient gown, report on patient's clinical status given to PACU RN, RN questions answered.     Handoff Report: Identifed the Patient, Identified the Reponsible Provider, Reviewed the pertinent medical history, Discussed the surgical course, Reviewed Intra-OP anesthesia mangement and issues during anesthesia, Set expectations for post-procedure period and Allowed opportunity for questions and acknowledgement of understanding      Vitals: (Last set prior to Anesthesia Care Transfer)  CRNA VITALS  2/18/2021 0920 - 2/18/2021 0954      2/18/2021             Resp Rate (set):  10        Electronically Signed By: Liliana ROTH  CARMEN Gonzalez CRNA  February 18, 2021  9:54 AM

## 2021-02-18 NOTE — BRIEF OP NOTE
[facility] Brief Operative Note    Pre-operative diagnosis: Female stress incontinence [N39.3]  Uterovaginal prolapse, incomplete [N81.2]   Post-operative diagnosis Sigmoid adhesions, Uterovaginal prolapse - incomplete, Cystocele, perineal laceration     Procedure: Procedure(s):  ROBOTIC-ASSISTED SUPRACERVICAL HYSTERECTOMY, BILATERAL SALPINGO-OOPHORECTOMY, LYSIS OF ADHESSIONS  SACROCOLPOPEXY, CYSTOSCOPY, PERINEAL REPAIR, CYSTOCELE REPAIR  Davinci Lysis of Adhesions   Surgeon(s): Surgeon(s) and Role:     * Sean Marx MD - Primary     * Kristin Sanchez PA-C - Assisting   Estimated blood loss: 20 mL    Specimens: ID Type Source Tests Collected by Time Destination   A : UTERUS, BITLATERAL TUBES, BILATERAL OVARIES Tissue Uterus SURGICAL PATHOLOGY EXAM Sean Marx MD 2/18/2021  9:27 AM       Findings: Normal uterus tubes and ovaries. Some left lateral rectosigmoid adhesions taken down. Stage 0 support and no leaking with coude testing so we held off on the sling. Her cystocele was greatly reduced enough to provide proper urethral support. Ureteral orifices open on cysto.     
no

## 2021-02-18 NOTE — DISCHARGE INSTRUCTIONS
Same Day Surgery Discharge Instructions for  Sedation and General Anesthesia       It's not unusual to feel dizzy, light-headed or faint for up to 24 hours after surgery or while taking pain medication.  If you have these symptoms: sit for a few minutes before standing and have someone assist you when you get up to walk or use the bathroom.      You should rest and relax for the next 24 hours. We recommend you make arrangements to have an adult stay with you for at least 24 hours after your discharge.  Avoid hazardous and strenuous activity.      DO NOT DRIVE any vehicle or operate mechanical equipment for 24 hours following the end of your surgery.  Even though you may feel normal, your reactions may be affected by the medication you have received.      Do not drink alcoholic beverages for 24 hours following surgery.       Slowly progress to your regular diet as you feel able. It's not unusual to feel nauseated and/or vomit after receiving anesthesia.  If you develop these symptoms, drink clear liquids (apple juice, ginger ale, broth, 7-up, etc. ) until you feel better.  If your nausea and vomiting persists for 24 hours, please notify your surgeon.        All narcotic pain medications, along with inactivity and anesthesia, can cause constipation. Drinking plenty of liquids and increasing fiber intake will help.      For any questions of a medical nature, call your surgeon.      Do not make important decisions for 24 hours.      If you had general anesthesia, you may have a sore throat for a couple of days related to the breathing tube used during surgery.  You may use Cepacol lozenges to help with this discomfort.  If it worsens or if you develop a fever, contact your surgeon.       If you feel your pain is not well managed with the pain medications prescribed by your surgeon, please contact your surgeon's office to let them know so they can address your concerns.       CoVid 19 Information    We want to give you  information regarding Covid. Please consult your primary care provider with any questions you might have.     Patient who have symptoms (cough, fever, or shortness of breath), need to isolate for 7 days from when symptoms started OR 72 hours after fever resolves (without fever reducing medications) AND improvement of respiratory symptoms (whichever is longer).      Isolate yourself at home (in own room/own bathroom if possible)    Do Not allow any visitors    Do Not go to work or school    Do Not go to [employer],  centers, shopping, or other public places.    Do Not shake hands.    Avoid close and intimate contact with others (hugging, kissing).    Follow CDC recommendations for household cleaning of frequently touched services.     After the initial 7 days, continue to isolate yourself from household members as much as possible. To continue decrease the risk of community spread and exposure, you and any members of your household should limit activities in public for 14 days after starting home isolation.     You can reference the following CDC link for helpful home isolation/care tips:  https://www.cdc.gov/coronavirus/2019-ncov/downloads/10Things.pdf    Protect Others:    Cover Your Mouth and Nose with a mask, disposable tissue or wash cloth to avoid spreading germs to others.    Wash your hands and face frequently with soap and water    Call Your Primary Doctor If: Breathing difficulty develops or you become worse.    For more information about COVID19 and options for caring for yourself at home, please visit the CDC website at https://www.cdc.gov/coronavirus/2019-ncov/about/steps-when-sick.html  For more options for care at [facility], please visit our website at [email or web address]    HOME CARE FOLLOWING LAPAROSCOPY    Diet  You have no restrictions on your diet.  During the evening following surgery, drink plenty of fluids and eat a light supper.    Nausea  The  anesthesia may produce some nausea.  If you feel nauseated, stay in bed and try drinking fluids such as 7-Up, tea, or soup.    Discomfort  The amount of discomfort you can expect is very unpredictable.  If you have pain that cannot be controlled with Tylenol or with the prescription you may have received, you should notify your physician.  The following complaints are not uncommon and should not be cause for concern:   1.  Abdominal tenderness; abdominal cramping.   2.  Low backache or pains radiating to your shoulders, chest or back. This is result of the gas used to inflate your abdomen during surgery. Lying flat in bed seems to help relieve this.   3.  Sore throat for a day or two resulting from the anesthesia tube used during surgery.   4.  Black or blue yung on your abdomen.     Drainage  You may expect a small amount of drainage from the incision on your abdomen and you may change the bandage when necessary.  You will also have a small amount of vaginal drainage for several days; this is normal and no cause for concern.  If excessive bleeding occurs, notify your physician.    Fever  A low grade fever (not over 100  F) is usual after this procedure.  Do not hesitate to notify your physician if your fever seems excessive.    Stitches  If your stitches are the type that must be removed, your doctor will instruct you to return to their office.    Activity  Rest on the day of surgery then you may resume your normal activity, as tolerated. Avoid heavy lifting for one week.    You may shower.  Do not douche, and do not use tampons.  If you also had a D&C, do not resume intercourse until bleeding has ceased.    Emergency Care  Contact your physician if you have any of these problems:   1.  A fever over 100 F   2.  A large amount of bleeding or drainage   3.  Severe pain

## 2021-02-18 NOTE — OP NOTE
Procedure Date: 02/18/2021      PREOPERATIVE DIAGNOSES:   1.  Uterovaginal prolapse -- incomplete.   2.  Cystocele.   3.  Stress urinary incontinence.      POSTOPERATIVE DIAGNOSES:   1.  Uterovaginal prolapse -- incomplete.   2.  Cystocele.   3.  Sigmoid adhesions.   4.  Perineal laceration.      PROCEDURES:   1.  Da Tiara supracervical hysterectomy.   2.  Bilateral salpingo-oophorectomy.   3.  Lysis of adhesions.   4.  Sacrocolpopexy.   5.  Cystoscopy.   6.  Perineal laceration repair.      SURGEON:  Sean Marx MD      ASSISTANT:  Kristin Sanchez PA-C      ANESTHESIA:  General.      ESTIMATED BLOOD LOSS:  20 mL.      COMPLICATIONS:  None.      SPECIMENS:  Uterus, fallopian tubes and ovaries to Pathology.      FINDINGS:  The patient had a sacrocolpopexy performed and achieved stage zero support.  We used 6 cm anteriorly and 7 posteriorly to achieve this effect.  Ureters were seen propulsing bilaterally and this was confirmed on cystoscopy where both orifices showed ejection of urine.  We backfilled the bladder with 300-400 mL.  Did a coude test and there was no leakage of urine after her support procedure.  For this reason, we decided to forego the suburethral sling procedure.  She did get a perineal laceration in the form of a split at 6 o'clock as a result of the Justin surgical vaginal manipulator probe.  I repaired this with a single simple interrupted stitch of 2-0 Vicryl.  She did have some rectosigmoid adhesions, and these were cut down with a scissors.      DESCRIPTION OF OPERATIVE PROCEDURE:  After obtaining informed consent, the patient was prepped and draped in the usual manner for an abdominovaginal procedure.  Contreras catheter was inserted in the bladder, and a Justin surgical vaginal manipulator probe was inserted into the vagina.  Gloves were changed and attention was turned to the abdomen.      I created a 3 cm umbilical skin incision and carried the dissection down to the level of the fascia.   Fascia was nicked, undermined with Hoff scissors, and extended superiorly and inferiorly.  We entered the parietoperitoneum bluntly and placed our GelPOINT wound retractor.  We placed the GelPOINT cap with the preinserted trocars and under direct visualization, we added our right and left da Tiara arm ports.  The patient was placed in 29 degrees Trendelenburg and the da Tiara robot was docked.  I took my place at the operative console.  Using scissors in my right hand and a vessel sealer in my left arm, I cut down some adhesions around the rectosigmoid that attached the rectum to the left pelvic sidewall.  We then isolated a spot at the promontory over the body of L5, and I cut the retroperitoneum and did blunt and sharp dissection until we got down to the anterior longitudinal ligament.  We used the vessel sealer, then tunneled out the distribution of the right uterosacral ligament, coming out down near the cervix where I completed the tunnel by cutting a hole in the retroperitoneum with the scissors.  We then grasped the left infundibulopelvic artery and vein with the vessel sealer and after locating the ureter, we sealed and cut this.  I finished the attachment of the ovary with the vessel sealer and worked my way through the broad ligament, crossing the left round ligament in the process.  After the vessel sealer made its way through there, we did sharp dissection and blunt dissection,  the anterior and posterior leaves of the broad ligament from the left uterine artery and vein.  We cut the anterior leaf of the broad ligament with the scissors, coming across the specimen until we had a bladder flap.  We then pushed the bladder flap down over the vaginal manipulator probe and eventually achieved 6 cm of anterior depth of dissection.  Next, now that the left uterine artery and vein were skeletonized, we sealed and cut those at the cervical isthmus and I did an additional grab down the barrel of the  cervix and sealed and cut that.  We did a similar dissection on the patient's right side and without difficulty.  I then amputated the cervix just below the cervical isthmus, leaving 1.5 cm of the cervix left.  We imbricated the cervical stump with 0 Stratafix in a running stitch.  Uterus was stowed in the upper abdomen for later removal through the umbilicus.  I then dissected posteriorly between the uterosacral ligaments, cutting the posterior wall peritoneum and doing blunt and sharp dissection until I was against the vagina and the vaginal manipulator probe.  After this was all cleaned off, we cut Artisyn mesh to fit the dissection plane, and it was sewed into place with 5 simple interrupted stitches anteriorly and 4 simple interrupted stitches of 2-0 Pomona-Humberto posteriorly.  We then pulled the mesh strap through the peritoneal tunnel and under appropriate tension, sewed it to the anterior longitudinal ligament with 2 simple interrupted stitches of 2-0 Pomona-Humberto.  Excess mesh was cut at the peritoneum, and we oversewed the peritoneal defect there with a figure-of-eight of 3-0 Vicryl.  We closed the lower field peritoneum with 3-0 Stratafix in a running stitch.  Once mesh was completely covered with peritoneum, the procedure was complete.  I handed the specimen to my assistant through the GelPOINT cap with which she used a locking Prestige grasper.  I scrubbed back into the case.      The da Tiara robot was undocked.  The patient's positioning was neutralized and I removed the GelPOINT cap.  The uterus, tubes and ovaries easily pulled through and I removed the wound retractor.  We closed the fascia and peritoneum with 0 looped Maxon in a running stitch.  Skin was closed in each of the port sites with 4-0 Vicryl in subcuticular stitches and the wounds were sealed with Dermabond.      I took my place at the lower field and removed the Contreras catheter and inserted a 20-[place] cystoscope.  I might have overfilled the  bladder initially, so I drained some of it, because we were not seeing efflux of urine.  I gave her a little bit of Lasix IV and I easily saw jets of urine on the right and left.  The Contreras catheter was replaced and the procedure was complete, as the patient had passed her coude test and effectively had not leaked at all.  Needle, sponge and instrument counts were correct.  The patient tolerated the procedure well.      DISPOSITION:  The patient is in satisfactory stable condition and is in recovery.         ABHI KHOURY MD             D: 2021   T: 2021   MT: DOMINIC      Name:     DAVIS BRIDGES   MRN:      [id number]        Account:        [id number]   :      1945           Procedure Date: 2021      Document: [id number]

## 2021-02-18 NOTE — ANESTHESIA PROCEDURE NOTES
Airway   Date/Time: 2/18/2021 7:33 AM   Patient location during procedure: OR  Staff -   Other Anesthesia Staff: Ana Willis  Performed By: MARVIN    Indications and Patient Condition  Indications for airway management: thanh-procedural  Induction type:intravenousMask difficulty assessment: 1 - vent by mask    Final Airway Details  Final airway type: endotracheal airway  Successful airway:ETT - single and Oral  Endotracheal Airway Details   ETT size (mm): 7.0  Cuffed: yes  Successful intubation technique: video laryngoscopy  Grade View of Cords: 1  Adjucts: stylet  Measured from: gums/teeth  Secured at (cm): 23  Secured with: pink tape  Bite block used: None    Post intubation assessment   Placement verified by: capnometry, equal breath sounds and chest rise   Number of attempts at approach: 1  Number of other approaches attempted: 0  Secured with:pink tape  Ease of procedure: easy  Dentition: Intact and Unchanged

## 2021-02-18 NOTE — ANESTHESIA POSTPROCEDURE EVALUATION
Patient: Justine Michelle    Procedure(s):  ROBOTIC-ASSISTED SUPRACERVICAL HYSTERECTOMY, BILATERAL SALPINGO-OOPHORECTOMY, LYSIS OF ADHESSIONS  SACROCOLPOPEXY, CYSTOSCOPY, PERINEAL REPAIR, CYSTOCELE REPAIR  Davinci Lysis of Adhesions    Diagnosis:Female stress incontinence [N39.3]  Uterovaginal prolapse, incomplete [N81.2]  Diagnosis Additional Information: No value filed.    Anesthesia Type:  General    Note:  Disposition: Inpatient   Postop Pain Control: Uneventful            Sign Out: Well controlled pain   PONV: No   Neuro/Psych: Uneventful            Sign Out: Acceptable/Baseline neuro status   Airway/Respiratory: Uneventful            Sign Out: Acceptable/Baseline resp. status   CV/Hemodynamics: Uneventful            Sign Out: Acceptable CV status   Other NRE: NONE   DID A NON-ROUTINE EVENT OCCUR? No         Last vitals:  Vitals:    02/18/21 1200 02/18/21 1303 02/18/21 1354   BP: 119/63 133/71 124/71   Pulse: 59 67 66   Resp:  11 12   Temp:      SpO2: 96% 95% 98%       Last vitals prior to Anesthesia Care Transfer:  CRNA VITALS  2/18/2021 0920 - 2/18/2021 1020      2/18/2021             Resp Rate (set):  10          Electronically Signed By: Portia Sparks MD, MD  February 18, 2021  2:31 PM

## 2021-02-18 NOTE — ANESTHESIA PREPROCEDURE EVALUATION
Anesthesia Pre-Procedure Evaluation    Patient: Justine Michelle   MRN: [id number] : 1945        Preoperative Diagnosis: Female stress incontinence [N39.3]  Uterovaginal prolapse, incomplete [N81.2]   Procedure : Procedure(s):  ROBOTIC-ASSISTED TOTAL HYSTERECTOMY, BILATERAL SALPINGO-OOPHORECTOMY  SACROCOLPOPEXY, CYSTOSCOPY, ADVANTAGE FIT SLING     Past Medical History:   Diagnosis Date     Absence of menstruation     not on HRT     Acute abdominal pain 2013     Diverticulitis of colon 2013    also ,      Diverticulosis of colon (without mention of hemorrhage)     colonoscopy due in      Myalgia and myositis, unspecified      Obesity, unspecified      Other and unspecified hyperlipidemia      Pain in right knee      Palpitations       Past Surgical History:   Procedure Laterality Date     APPENDECTOMY       ARTHROPLASTY HIP Left 3/17/2020    Procedure: Left total hip arthroplasty using a Biomet G7 acetabulum and Taperloc femoral stem;  Surgeon: Bulmaro Douglass MD;  Location: [facility] OR     ARTHROPLASTY KNEE Right 2020    Procedure: Right total knee arthroplasty using a Tha NexGen total knee system;  Surgeon: Bulmaro Douglass MD;  Location: [facility] OR     CHOLECYSTECTOMY, LAPOROSCOPIC      Cholecystectomy, Laparoscopic     excision of cyst right middle & little finger       HC COLONOSCOPY THRU STOMA, DIAGNOSTIC      nl     ORTHOPEDIC SURGERY Left     buionette     TUBAL LIGATION       ZZC NONSPECIFIC PROCEDURE      nl     ZZ NONSPECIFIC PROCEDURE      nl      Allergies   Allergen Reactions     Amoxicillin      Augmentin Rash     Bees      hives      Social History     Tobacco Use     Smoking status: Never Smoker     Smokeless tobacco: Never Used   Substance Use Topics     Alcohol use: Yes     Comment: 2 glasses of wine per week      Wt Readings from Last 1 Encounters:   21 91.6 kg (202 lb)        Anesthesia Evaluation   Pt has had prior  anesthetic.     No history of anesthetic complications       ROS/MED HX  ENT/Pulmonary:    (-) sleep apnea   Neurologic: Comment: RLS      Cardiovascular:       METS/Exercise Tolerance:     Hematologic:       Musculoskeletal:       GI/Hepatic:    (-) GERD   Renal/Genitourinary:       Endo:       Psychiatric/Substance Use:       Infectious Disease:       Malignancy:       Other:            Physical Exam    Airway        Mallampati: I   TM distance: > 3 FB   Neck ROM: full   Mouth opening: > 3 cm    Respiratory Devices and Support         Dental  no notable dental history         Cardiovascular   cardiovascular exam normal          Pulmonary   pulmonary exam normal                OUTSIDE LABS:  CBC:   Lab Results   Component Value Date    WBC 3.5 (L) 08/31/2020    WBC 4.7 01/06/2020    HGB 12.9 02/18/2021    HGB 12.7 02/08/2021    HCT 39.6 08/31/2020    HCT 40.7 01/06/2020     09/08/2020     08/31/2020     BMP:   Lab Results   Component Value Date     08/31/2020     01/06/2020    POTASSIUM 3.9 08/31/2020    POTASSIUM 3.7 01/06/2020    CHLORIDE 108 08/31/2020    CHLORIDE 106 01/06/2020    CO2 26 08/31/2020    CO2 25 01/06/2020    BUN 21 08/31/2020    BUN 15 01/06/2020    CR 0.68 09/08/2020    CR 0.70 08/31/2020     (H) 09/09/2020    GLC 87 08/31/2020     COAGS: No results found for: PTT, INR, FIBR  POC: No results found for: BGM, HCG, HCGS  HEPATIC:   Lab Results   Component Value Date    ALBUMIN 3.8 01/06/2020    PROTTOTAL 7.5 01/06/2020    ALT 20 01/06/2020    AST 15 01/06/2020    ALKPHOS 76 01/06/2020    BILITOTAL 0.6 01/06/2020     OTHER:   Lab Results   Component Value Date    A1C 5.7 05/02/2014    NIKKY 9.7 08/31/2020    MAG 1.9 10/23/2014    LIPASE 63 04/16/2013    AMYLASE 43 04/16/2013    TSH 1.35 01/06/2020    T4 0.91 05/02/2016    SED 25 01/06/2020       Anesthesia Plan    ASA Status:  2   NPO Status:  NPO Appropriate    Anesthesia Type: General.     - Airway: ETT   Induction:  Intravenous.   Maintenance: TIVA.   Techniques and Equipment:     - Airway: Video-Laryngoscope     - Lines/Monitors: 2nd IV     Consents    Anesthesia Plan(s) and associated risks, benefits, and realistic alternatives discussed. Questions answered and patient/representative(s) expressed understanding.     - Discussed with:  Patient         Postoperative Care    Pain management: IV analgesics, Oral pain medications.   PONV prophylaxis: Ondansetron (or other 5HT-3), Dexamethasone or Solumedrol     Comments:    Propofol and precedex gtt            Portia Sparks MD, MD

## 2021-02-19 VITALS
DIASTOLIC BLOOD PRESSURE: 83 MMHG | OXYGEN SATURATION: 96 % | TEMPERATURE: 98.2 F | BODY MASS INDEX: 31.71 KG/M2 | HEART RATE: 66 BPM | RESPIRATION RATE: 15 BRPM | SYSTOLIC BLOOD PRESSURE: 157 MMHG | HEIGHT: 67 IN | WEIGHT: 202 LBS

## 2021-02-19 LAB — COPATH REPORT: NORMAL

## 2021-02-19 PROCEDURE — 250N000013 HC RX MED GY IP 250 OP 250 PS 637: Performed by: OBSTETRICS & GYNECOLOGY

## 2021-02-19 PROCEDURE — 250N000011 HC RX IP 250 OP 636: Performed by: OBSTETRICS & GYNECOLOGY

## 2021-02-19 RX ADMIN — FAMOTIDINE 20 MG: 20 TABLET ORAL at 09:25

## 2021-02-19 RX ADMIN — KETOROLAC TROMETHAMINE 15 MG: 15 INJECTION, SOLUTION INTRAMUSCULAR; INTRAVENOUS at 04:57

## 2021-02-19 RX ADMIN — OXYCODONE HYDROCHLORIDE AND ACETAMINOPHEN 2 TABLET: 5; 325 TABLET ORAL at 09:28

## 2021-02-19 NOTE — PLAN OF CARE
A&O x 4. Hypertensive at times, otherwise VSS on room air. SBA with gait belt. Full liquid diet, tolerating well. Mild nausea with movement, zofran given x1. PIV infusing LR @ 100ml/h. Abdominal incisions WDL, some ecchymosis. Pain managed with prn percocet and scheduled toradol. Contreras with adequate output. BS hypoactive. Plan for discharge tomorrow. Continue plan of care.

## 2021-02-19 NOTE — PROGRESS NOTES
Pt. Alert and oriented x4. Vital signs stable on RA. Up SBA. Tolerating reg diet. Lung sounds clear. Bowel sounds active, + flatus. BM yesterday, voiding adequately. Lap sites x3 CDI. Pain managed with Percocet. Denies nausea. Pt discharging home. Reviewed med list and discharge instructions with pt.

## 2021-02-19 NOTE — PROGRESS NOTES
Doing  Well. Voided 300 ml and I can't see residual urine with  Bladder scan. She knows to report any stress incontinence. Reviewed surgery and anatomical relationships. Answered questions again about cervix staying in to prevent mesh erosion at apex.     Ok to go home

## 2021-02-19 NOTE — PLAN OF CARE
A&O x4, VSS. Contreras was pulled this A.M. at 0645, patient is due to void. Up with stand by assist with gait belt. Patient upgraded this A.M. to regular diet. Abdominal incisions WDL with bruising. Pain is managed with IV Torodol. LS clear, I.S. up to 2000. Bowel sounds active with +flatus.

## 2021-03-08 ENCOUNTER — DOCUMENTATION ONLY (OUTPATIENT)
Dept: OTHER | Facility: CLINIC | Age: 76
End: 2021-03-08

## 2021-03-09 ENCOUNTER — TRANSFERRED RECORDS (OUTPATIENT)
Dept: HEALTH INFORMATION MANAGEMENT | Facility: CLINIC | Age: 76
End: 2021-03-09

## 2021-03-20 ENCOUNTER — IMMUNIZATION (OUTPATIENT)
Dept: NURSING | Facility: CLINIC | Age: 76
End: 2021-03-20
Payer: COMMERCIAL

## 2021-03-20 PROCEDURE — 0001A PR COVID VAC PFIZER DIL RECON 30 MCG/0.3 ML IM: CPT

## 2021-03-20 PROCEDURE — 91300 PR COVID VAC PFIZER DIL RECON 30 MCG/0.3 ML IM: CPT

## 2021-03-23 ENCOUNTER — OFFICE VISIT (OUTPATIENT)
Dept: INTERNAL MEDICINE | Facility: CLINIC | Age: 76
End: 2021-03-23
Payer: COMMERCIAL

## 2021-03-23 VITALS
WEIGHT: 200 LBS | HEART RATE: 86 BPM | DIASTOLIC BLOOD PRESSURE: 80 MMHG | HEIGHT: 67 IN | RESPIRATION RATE: 16 BRPM | TEMPERATURE: 98.3 F | BODY MASS INDEX: 31.39 KG/M2 | SYSTOLIC BLOOD PRESSURE: 127 MMHG | OXYGEN SATURATION: 97 %

## 2021-03-23 DIAGNOSIS — Z01.818 PREOP GENERAL PHYSICAL EXAM: Primary | ICD-10-CM

## 2021-03-23 DIAGNOSIS — N81.11 CYSTOCELE, MIDLINE: ICD-10-CM

## 2021-03-23 PROCEDURE — 99213 OFFICE O/P EST LOW 20 MIN: CPT | Performed by: INTERNAL MEDICINE

## 2021-03-23 PROCEDURE — 36415 COLL VENOUS BLD VENIPUNCTURE: CPT | Performed by: INTERNAL MEDICINE

## 2021-03-23 PROCEDURE — 80048 BASIC METABOLIC PNL TOTAL CA: CPT | Performed by: INTERNAL MEDICINE

## 2021-03-23 ASSESSMENT — MIFFLIN-ST. JEOR: SCORE: 1434.82

## 2021-03-23 NOTE — PATIENT INSTRUCTIONS

## 2021-03-23 NOTE — PROGRESS NOTES
[place] [address]  [facility] [address]  Phone: [phone]  Primary Provider: Corey Ambrose  Pre-op Performing Provider: WILIAN MCDONALD    PREOPERATIVE EVALUATION:  Today's date: 3/23/2021    Justine Michelle is a 75 year old female who presents for a preoperative evaluation.    Surgical Information:  Surgery/Procedure: bladder surgery  Surgery Location: [facility]  Surgeon:   Surgery Date: 3/26/2021  Time of Surgery: 0915  Where patient plans to recover: At home with family  Fax number for surgical facility: [phone]    Type of Anesthesia Anticipated: to be determined    Assessment & Plan     The proposed surgical procedure is considered INTERMEDIATE risk.    Problem List Items Addressed This Visit     None      Visit Diagnoses     Preop general physical exam    -  Primary    Relevant Orders    Basic metabolic panel  (Ca, Cl, CO2, Creat, Gluc, K, Na, BUN)    Cystocele, midline             Risks and Recommendations:  The patient has the following additional risks and recommendations for perioperative complications:   - No identified additional risk factors other than previously addressed    Medication Instructions:  Patient is on no chronic medications    RECOMMENDATION:  APPROVAL GIVEN to proceed with proposed procedure, without further diagnostic evaluation.      Subjective     HPI related to upcoming procedure:   Patient is here for preoperative clearance for bladder sling for incontinence.  No known medical problem.  Denies chest pain, shortness breath, palpitation.  Denies fever, chills.  Denies COVID-19 exposure.    Preop Questions 3/23/2021   1. Have you ever had a heart attack or stroke? No   2. Have you ever had surgery on your heart or blood vessels, such as a stent placement, a coronary artery bypass, or surgery on an artery in your head, neck, heart, or legs? No   3. Do you have chest pain with activity? No   4. Do you  have a history of  heart failure? No   5. Do you currently have a cold, bronchitis or symptoms of other infection? No   6. Do you have a cough, shortness of breath, or wheezing? No   7. Do you or anyone in your family have previous history of blood clots? No   8. Do you or does anyone in your family have a serious bleeding problem such as prolonged bleeding following surgeries or cuts? No   9. Have you ever had problems with anemia or been told to take iron pills? No   10. Have you had any abnormal blood loss such as black, tarry or bloody stools, or abnormal vaginal bleeding? No   11. Have you ever had a blood transfusion? No   12. Are you willing to have a blood transfusion if it is medically needed before, during, or after your surgery? Yes   13. Have you or any of your relatives ever had problems with anesthesia? No   14. Do you have sleep apnea, excessive snoring or daytime drowsiness? No   15. Do you have any artifical heart valves or other implanted medical devices like a pacemaker, defibrillator, or continuous glucose monitor? No   16. Do you have artificial joints? YES - hip, knee   17. Are you allergic to latex? No   18. Is there any chance that you may be pregnant? NA     Health Care Directive:  Patient has a Health Care Directive on file    Preoperative Review of :   reviewed - controlled substances reflected in medication list.    Review of Systems  CONSTITUTIONAL: NEGATIVE for fever, chills, change in weight  INTEGUMENTARY/SKIN: NEGATIVE for worrisome rashes, moles or lesions  EYES: NEGATIVE for vision changes or irritation  ENT/MOUTH: NEGATIVE for ear, mouth and throat problems  RESP: NEGATIVE for significant cough or SOB  BREAST: NEGATIVE for masses, tenderness or discharge  CV: NEGATIVE for chest pain, palpitations or peripheral edema  GI: NEGATIVE for nausea, abdominal pain, heartburn, or change in bowel habits  : NEGATIVE for frequency, dysuria, or hematuria  MUSCULOSKELETAL: NEGATIVE for  significant arthralgias or myalgia  NEURO: NEGATIVE for weakness, dizziness or paresthesias  ENDOCRINE: NEGATIVE for temperature intolerance, skin/hair changes  HEME: NEGATIVE for bleeding problems  PSYCHIATRIC: NEGATIVE for changes in mood or affect    Patient Active Problem List    Diagnosis Date Noted     S/P total knee arthroplasty 09/08/2020     Priority: Medium     S/P total hip arthroplasty 03/17/2020     Priority: Medium     Glaucoma suspect, bilateral 11/28/2018     Priority: Medium     Right knee pain, unspecified chronicity 05/08/2017     Priority: Medium     Fatigue, unspecified type 11/07/2016     Priority: Medium     Anemia, unspecified type 11/07/2016     Priority: Medium     RECENT EPISODE OF DIVERTICULITIS       Numerous moles 08/04/2016     Priority: Medium     Myalgia  01/07/2016     Priority: Medium     Restless leg syndrome 01/07/2016     Priority: Medium     Overweight (BMI 25.0-29.9) 01/07/2016     Priority: Medium     Adult BMI 28.0-28.9 kg/sq m 10/01/2015     Priority: Medium     CARDIOVASCULAR SCREENING; LDL GOAL LESS THAN 160 01/20/2015     Priority: Medium     Fatigue 01/20/2015     Priority: Medium     Ascorbic acid deficiency 10/28/2014     Priority: Medium     Diagnosis updated by automated process. Provider to review and confirm.       Vitamin B12 deficiency without anemia 10/28/2014     Priority: Medium     Diagnosis updated by automated process. Provider to review and confirm.       Senile cataract 10/28/2014     Priority: Medium     Elevated ferritin level 10/28/2014     Priority: Medium     Vitamin D deficiency 10/28/2014     Priority: Medium     Palpitations 06/03/2014     Priority: Medium     Acute abdominal pain 04/16/2013     Priority: Medium     Diverticulitis of colon 04/16/2013     Priority: Medium     Myalgia and myositis 09/22/2006     Priority: Medium     Problem list name updated by automated process. Provider to review       Diverticulosis of large intestine       Priority: Medium     colonoscopy due in 2013  Problem list name updated by automated process. Provider to review       Absence of menstruation      Priority: Medium     not on HRT        Past Medical History:   Diagnosis Date     Absence of menstruation 2006    not on HRT     Acute abdominal pain 4/16/2013     Diverticulitis of colon 4/16/2013    also 2003, 2007     Diverticulosis of colon (without mention of hemorrhage)     colonoscopy due in 2013     Myalgia and myositis, unspecified      Obesity, unspecified      Other and unspecified hyperlipidemia      Pain in right knee      Palpitations      Past Surgical History:   Procedure Laterality Date     APPENDECTOMY       ARTHROPLASTY HIP Left 3/17/2020    Procedure: Left total hip arthroplasty using a Biomet G7 acetabulum and Taperloc femoral stem;  Surgeon: Bulmaro Douglass MD;  Location: [facility] OR     ARTHROPLASTY KNEE Right 9/8/2020    Procedure: Right total knee arthroplasty using a Tha NexGen total knee system;  Surgeon: Bulmaro Douglass MD;  Location: [facility] OR     CHOLECYSTECTOMY, LAPOROSCOPIC      Cholecystectomy, Laparoscopic     DAVINCI HYSTERECTOMY TOTAL, BILATERAL SALPINGO-OOPHORECTOMY, COMBINED N/A 2/18/2021    Procedure: ROBOTIC-ASSISTED SUPRACERVICAL HYSTERECTOMY, BILATERAL SALPINGO-OOPHORECTOMY, LYSIS OF ADHESSIONS;  Surgeon: Sean Marx MD;  Location: [facility] OR     DAVINCI LYSIS OF ADHESIONS N/A 2/18/2021    Procedure: Davinci Lysis of Adhesions;  Surgeon: Sean Marx MD;  Location: [facility] OR     DAVINCI SACROCOLPOPEXY, MIDURETHRAL SLING, CYSTOSCOPY N/A 2/18/2021    Procedure: SACROCOLPOPEXY, CYSTOSCOPY, PERINEAL REPAIR, CYSTOCELE REPAIR;  Surgeon: Sean Marx MD;  Location: [facility] OR     excision of cyst right middle & little finger       HC COLONOSCOPY THRU STOMA, DIAGNOSTIC  2000    nl     ORTHOPEDIC SURGERY Left 1991    buionette     TUBAL LIGATION       ZZC NONSPECIFIC PROCEDURE  2006    nl     ZC NONSPECIFIC PROCEDURE  " 2005    nl     Current Outpatient Medications   Medication Sig Dispense Refill     calcium carbonate 750 MG CHEW Take 1,500 mg by mouth daily        Cyanocobalamin (B-12 SUPER STRENGTH) 5000 MCG/ML LIQD Place 0.5 mLs under the tongue daily FOR VITAMIN B12 SUPPLEMENTATION, PLEASE PLACE UNDER THE TONGUE 2 Bottle PRN     FIBER PO Take 1 tablet by mouth daily as needed        ibuprofen (ADVIL/MOTRIN) 600 MG tablet Take 1 tablet (600 mg) by mouth every 6 hours as needed for pain (mild) 30 tablet 0     latanoprost (XALATAN) 0.005 % ophthalmic solution Place 1 drop into both eyes At Bedtime        vitamin C (ASCORBIC ACID) 500 MG tablet Take 1,000 mg by mouth daily       vitamin D3 (CHOLECALCIFEROL) 25 mcg (32755 units) capsule Take 2 capsules by mouth every 7 days On Sundays         Allergies   Allergen Reactions     Amoxicillin      Augmentin Rash     Bees      hives        Social History     Tobacco Use     Smoking status: Never Smoker     Smokeless tobacco: Never Used   Substance Use Topics     Alcohol use: Yes     Comment: 2 glasses of wine per week       History   Drug Use No         Objective     /80   Pulse 86   Temp 98.3  F (36.8  C) (Oral)   Resp 16   Ht 1.702 m (5' 7\")   Wt 90.7 kg (200 lb)   LMP  (LMP Unknown)   SpO2 97%   Breastfeeding No   BMI 31.32 kg/m      Physical Exam    GENERAL APPEARANCE: healthy, alert and no distress     EYES: EOMI, PERRL     HENT: ear canals and TM's normal and nose and mouth without ulcers or lesions     NECK: no adenopathy, no asymmetry, masses, or scars and thyroid normal to palpation     RESP: lungs clear to auscultation - no rales, rhonchi or wheezes     CV: regular rates and rhythm, normal S1 S2, no S3 or S4 and no murmur, click or rub     ABDOMEN:  soft, nontender, no HSM or masses and bowel sounds normal     MS: extremities normal- no gross deformities noted, no evidence of inflammation in joints, FROM in all extremities.     SKIN: no suspicious lesions or " rashes     NEURO: Normal strength and tone, sensory exam grossly normal, mentation intact and speech normal     PSYCH: mentation appears normal. and affect normal/bright     LYMPHATICS: No cervical adenopathy    Recent Labs   Lab Test 02/18/21  0601 02/08/21  1458 09/08/20  1903 09/08/20  1903 08/31/20  1145 01/06/20  1536 01/06/20  1536   HGB 12.9 12.7   < >  --  12.8   < > 13.1   PLT  --   --   --  177 195   < > 253   NA  --   --   --   --  140  --  139   POTASSIUM  --   --   --   --  3.9  --  3.7   CR  --   --   --  0.68 0.70   < > 0.74    < > = values in this interval not displayed.        Diagnostics:  Labs pending at this time.  Results will be reviewed when available.   EKG: appears normal, NSR, PAC noted on 2/8/2021, normal axis, normal intervals, no acute ST/T changes c/w ischemia, unchanged from previous tracings    Revised Cardiac Risk Index (RCRI):  The patient has the following serious cardiovascular risks for perioperative complications:   - No serious cardiac risks = 0 points     RCRI Interpretation: 0 points: Class I (very low risk - 0.4% complication rate)     Signed Electronically by: Ross Jolley MD  Copy of this evaluation report is provided to requesting physician.

## 2021-03-23 NOTE — LETTER
March 24, 2021      Justine Michelle  [address] [place] [address]        Dear ,    We are writing to inform you of your test results.    Your test results fall within the expected range(s) or remain unchanged from previous results.  Please continue with current treatment plan.    Resulted Orders   Basic metabolic panel  (Ca, Cl, CO2, Creat, Gluc, K, Na, BUN)   Result Value Ref Range    Sodium 142 133 - 144 mmol/L    Potassium 3.8 3.4 - 5.3 mmol/L    Chloride 111 (H) 94 - 109 mmol/L    Carbon Dioxide 25 20 - 32 mmol/L    Anion Gap 6 3 - 14 mmol/L    Glucose 84 70 - 99 mg/dL      Comment:      Non Fasting    Urea Nitrogen 17 7 - 30 mg/dL    Creatinine 0.67 0.52 - 1.04 mg/dL    GFR Estimate 85 >60 mL/min/[1.73_m2]      Comment:      Non  GFR Calc  Starting 12/18/2018, serum creatinine based estimated GFR (eGFR) will be   calculated using the Chronic Kidney Disease Epidemiology Collaboration   (CKD-EPI) equation.      GFR Estimate If Black >90 >60 mL/min/[1.73_m2]      Comment:       GFR Calc  Starting 12/18/2018, serum creatinine based estimated GFR (eGFR) will be   calculated using the Chronic Kidney Disease Epidemiology Collaboration   (CKD-EPI) equation.      Calcium 9.1 8.5 - 10.1 mg/dL       If you have any questions or concerns, please call the clinic at the number listed above.       Sincerely,      Ross Jolley MD

## 2021-03-24 LAB
ANION GAP SERPL CALCULATED.3IONS-SCNC: 6 MMOL/L (ref 3–14)
BUN SERPL-MCNC: 17 MG/DL (ref 7–30)
CALCIUM SERPL-MCNC: 9.1 MG/DL (ref 8.5–10.1)
CHLORIDE SERPL-SCNC: 111 MMOL/L (ref 94–109)
CO2 SERPL-SCNC: 25 MMOL/L (ref 20–32)
CREAT SERPL-MCNC: 0.67 MG/DL (ref 0.52–1.04)
GFR SERPL CREATININE-BSD FRML MDRD: 85 ML/MIN/{1.73_M2}
GLUCOSE SERPL-MCNC: 84 MG/DL (ref 70–99)
POTASSIUM SERPL-SCNC: 3.8 MMOL/L (ref 3.4–5.3)
SODIUM SERPL-SCNC: 142 MMOL/L (ref 133–144)

## 2021-03-26 ENCOUNTER — HOSPITAL ENCOUNTER (EMERGENCY)
Facility: CLINIC | Age: 76
Discharge: HOME | End: 2021-03-26
Attending: PHYSICIAN ASSISTANT | Admitting: PHYSICIAN ASSISTANT
Payer: COMMERCIAL

## 2021-03-26 VITALS
TEMPERATURE: 98.1 F | HEART RATE: 71 BPM | OXYGEN SATURATION: 99 % | RESPIRATION RATE: 18 BRPM | DIASTOLIC BLOOD PRESSURE: 93 MMHG | SYSTOLIC BLOOD PRESSURE: 183 MMHG

## 2021-03-26 DIAGNOSIS — R33.9 URINARY RETENTION: ICD-10-CM

## 2021-03-26 PROCEDURE — 99282 EMERGENCY DEPT VISIT SF MDM: CPT

## 2021-03-26 ASSESSMENT — ENCOUNTER SYMPTOMS: ABDOMINAL PAIN: 1

## 2021-03-26 NOTE — ED TRIAGE NOTES
Patient presents with complaints of urine retention. Patient had a bladder sling done this morning done under local anesthesia. Patient has been unable to void since then. ABC intact without need for intervention at this time.

## 2021-03-26 NOTE — DISCHARGE INSTRUCTIONS
Please ensure that you follow-up with Dr. Marx on Tuesday at 10 AM for your scheduled appointment.  Return to the emergency department if you develop fever, no urine output, severe abdominal pain, or any other medical concerns.    I have attached catheter care instructions for your review.

## 2021-03-26 NOTE — ED PROVIDER NOTES
History     Chief Complaint:  Urine Retention     HPI  Justine Michelle is a 75 year old female who presents for evaluation of urine retention. The patient reports that she had a bladder sling done this morning at [facility] by Dr. Marx. She was discharged at 10 am and since then has been unable to urinate. She now states that she has pressure on her abdomen and is sore from the procedure. She called and Dr. Marx and he directed her to come into the emergency department to get a Contreras catheter placed for three days and then follow up with an appointment. She states that she already has an appointment scheduled with Dr. Marx on Tuesday at 10 am.  At the time of exam, the patient denied any additional medical concerns.    Review of Systems   Gastrointestinal: Positive for abdominal pain.   Genitourinary:        Urine retention   All other systems reviewed and are negative.    Allergies:  Amoxicillin  Augmentin  Bees    Medications:   Calcium carbonate  Cyanocobalamin   Xalatan   Vitamin C   Vitamin D3     Medical History:   Myalgia and myositis  Diverticulosis of large intestine  Absence of menstruation  Acute abdominal pain  Diverticulitis of colon  Palpitations  Ascorbic acid deficiency  Vitamin B12 deficiency without anemia  Senile cataract  Elevated ferritin level  Vitamin D deficiency  Fatigue  Myalgia   Restless leg syndrome  Overweight   Numerous moles  Fatigue, unspecified type  Anemia, unspecified type  Right knee pain, unspecified chronicity  Glaucoma suspect, bilateral    Past Surgical History:    Appendectomy   Arthroplasty hip  Arthroplasty knee  Cholecystectomy, laparoscopic  DaVinci hysterectomy total, bilateral salpingo-oophorectomy, combined  DaVinci lysis of adhesions  DaVinci sacrocolplexy, mid urethral sling, cystoscopy   HC colonoscopy thru stoma, diagnostic   Bunionette     Family History:    Father: heart disease  Mother: thyroid disease, skin cancer  Mother:  cardiovascular  Paternal grandmother: CORA.A.PATRIA     Social History:  The patient was unaccompanied to the ED.  PCP: Corey Ambrose     Physical Exam     Patient Vitals for the past 24 hrs:   BP Temp Pulse Resp SpO2   03/26/21 1607 (!) 183/93 98.1  F (36.7  C) 71 18 99 %      Physical Exam  Vitals signs and nursing note reviewed.   HENT:      Nose: Nose normal. No congestion or rhinorrhea.      Mouth/Throat:      Mouth: Mucous membranes are moist.   Eyes:      General: No scleral icterus.     Extraocular Movements: Extraocular movements intact.      Conjunctiva/sclera: Conjunctivae normal.   Cardiovascular:      Rate and Rhythm: Regular rhythm. Normal Rate.     Pulses: Normal pulses.      Heart sounds: Normal heart sounds.   Pulmonary:      Effort: Pulmonary effort is normal.      Breath sounds: Normal breath sounds.   Abdominal:      General: Abdomen is flat. Bowel sounds are normal.      Palpations: Abdomen is soft.      Tenderness: Mild suprapubic abdominal discomfort.  Musculoskeletal: Normal range of motion bilateral upper and lower extremities.  Patient observed ambulating in the ED without difficulty.  Skin:     General: Skin is warm and dry.   Neurological:      Mental Status: Alert.  Speech normal.  Responds appropriately to questions.  Psychiatric:         Mood and Affect: Mood normal.         Behavior: Behavior normal.   Emergency Department Course   Emergency Department Course:  Reviewed:  1707 I reviewed nursing notes, vitals, past medical history and care everywhere    Assessments:  1758 I rechecked the patient and explained findings.     Disposition:  The patient was discharged to home.   Impression & Plan   Medical Decision Making:  Justine Michelle is a 75 year old female who presents for evaluation of mild suprapubic abdominal pain and decreased urinary output following bladder sling placement this morning.  Vitals reviewed.  Patient afebrile and hemodynamically stable.  On arrival, bladder  scan was performed and there was 785 mL.  A Contreras catheter was placed and 1000 mL of urine drained.  On reassessment, the patient noted significant provement in her discomfort and requested discharge home.  The patient was discharged with a Contreras catheter in place with plans to follow-up with Dr. Marx on Tuesday at 10 AM for her scheduled appointment.  The patient was encouraged to return to the emergency department if she developed fever, severe abdominal discomfort, decreased urine drainage, or any other medical concerns.  All questions and concerns were addressed prior to discharge.    Diagnosis:     ICD-10-CM    1. Urinary retention  R33.9     Post-op      Discharge Medications:  There are no discharge medications for this patient.     Scribe Disclosure:  I, Darshana Ford, am serving as a scribe at 5:07 PM on 3/26/2021 to document services personally performed by Radha Vale PA-C based on my observations and the provider's statements to me.     [facility]               Radha Vale PA-C  03/26/21 0607

## 2021-04-09 ENCOUNTER — HOSPITAL PATHOLOGY (OUTPATIENT)
Dept: OTHER | Facility: CLINIC | Age: 76
End: 2021-04-09

## 2021-04-10 ENCOUNTER — IMMUNIZATION (OUTPATIENT)
Dept: NURSING | Facility: CLINIC | Age: 76
End: 2021-04-10
Attending: INTERNAL MEDICINE
Payer: COMMERCIAL

## 2021-04-10 PROCEDURE — 0002A PR COVID VAC PFIZER DIL RECON 30 MCG/0.3 ML IM: CPT

## 2021-04-10 PROCEDURE — 91300 PR COVID VAC PFIZER DIL RECON 30 MCG/0.3 ML IM: CPT

## 2021-04-12 LAB — COPATH REPORT: NORMAL

## 2021-05-08 ENCOUNTER — HEALTH MAINTENANCE LETTER (OUTPATIENT)
Age: 76
End: 2021-05-08

## 2022-06-04 ENCOUNTER — HEALTH MAINTENANCE LETTER (OUTPATIENT)
Age: 77
End: 2022-06-04

## 2023-06-11 ENCOUNTER — HEALTH MAINTENANCE LETTER (OUTPATIENT)
Age: 78
End: 2023-06-11

## (undated) DEVICE — GOWN IMPERVIOUS SPECIALTY XLG/XLONG 32474

## (undated) DEVICE — ADH SKIN CLOSURE PREMIERPRO EXOFIN 1.0ML 3470

## (undated) DEVICE — HOOD FLYTE W/PEELAWAY 408-800-100

## (undated) DEVICE — DAVINCI XI DRAPE ARM 470015

## (undated) DEVICE — LINEN DRAPE 54X72" 5467

## (undated) DEVICE — WIPES FOLEY CARE SURESTEP PROVON DFC100

## (undated) DEVICE — ESU GROUND PAD ADULT W/CORD E7507

## (undated) DEVICE — Device

## (undated) DEVICE — SPONGE RAY-TEC 4X8" 7318

## (undated) DEVICE — SUCTION CANISTER MEDIVAC LINER 3000ML W/LID 65651-530

## (undated) DEVICE — GLOVE PROTEXIS BLUE W/NEU-THERA 8.0  2D73EB80

## (undated) DEVICE — LINEN FULL SHEET 5511

## (undated) DEVICE — SOL NACL 0.9% INJ 1000ML BAG 2B1324X

## (undated) DEVICE — GLOVE PROTEXIS W/NEU-THERA 8.0  2D73TE80

## (undated) DEVICE — ESU GROUND PAD UNIVERSAL W/O CORD

## (undated) DEVICE — SUCTION MANIFOLD NEPTUNE 2 SYS 4 PORT 0702-020-000

## (undated) DEVICE — GLOVE PROTEXIS POWDER FREE SMT 8.0  2D72PT80X

## (undated) DEVICE — CLEANSER JET LAVAGE IRRISEPT 0.05% CHG IRRISEPT45USA

## (undated) DEVICE — DRSG AQUACEL AG 3.5X9.75" HYDROFIBER 412011

## (undated) DEVICE — SU STRATAFIX PDS PLUS 0 SPIRAL CT-1 15CM SXPP1B406

## (undated) DEVICE — CATH TRAY FOLEY SURESTEP 16FR WDRAIN BAG STLK LATEX A300316A

## (undated) DEVICE — DRAIN HEMOVAC RESERVOIR KIT 10FR 1/8" MED 00-2550-002-10

## (undated) DEVICE — DAVINCI XI SEAL UNIVERSAL 5-8MM 470361

## (undated) DEVICE — SU STRATAFIX PDS PLUS 1 CT-1 12" SXPP1A443

## (undated) DEVICE — DAVINCI XI OBTURATOR BLADELESS 8MM 470359

## (undated) DEVICE — SU STRATAFIX PDS PLUS 2-0 SPIRAL CT-1 9" 22CM SXPP1B456

## (undated) DEVICE — SU VICRYL+ 1 MO-4 18" DYED VCP702D

## (undated) DEVICE — CATH TRAY FOLEY SURESTEP 16FR DRAIN BAG STATOCK A899916

## (undated) DEVICE — SOL WATER IRRIG 1000ML BOTTLE 2F7114

## (undated) DEVICE — STPL SKIN 35W 6.9MM  PXW35

## (undated) DEVICE — GLOVE PROTEXIS BLUE W/NEU-THERA 7.0  2D73EB70

## (undated) DEVICE — PACK TOTAL HIP RIDGES LATEX PO15HIFSG

## (undated) DEVICE — LINEN ORTHO ACL PACK 5447

## (undated) DEVICE — DAVINCI XI NDL DRIVER MEGA SUTURE CUT 8MM 470309

## (undated) DEVICE — BONE CEMENT MIXEVAC III HI VAC KIT  0206-015-000

## (undated) DEVICE — SU VICRYL 3-0 SH 27" J316H

## (undated) DEVICE — PACK DAVINCI GYN SMA15GDFS1

## (undated) DEVICE — LINEN TOWEL PACK X5 5464

## (undated) DEVICE — SET HANDPIECE INTERPULSE W/COAXIAL FAN SPRAY TIP 0210118000

## (undated) DEVICE — LINEN HALF SHEET 5512

## (undated) DEVICE — BAG CLEAR TRASH 1.3M 39X33" P4040C

## (undated) DEVICE — DAVINCI HOT SHEARS TIP COVER  400180

## (undated) DEVICE — DRAPE STERI U 1015

## (undated) DEVICE — ENDO ACCESS PLATFORM GELPOINT SGL INCISION CNGL2

## (undated) DEVICE — SUTURE MONOCRYL+ 2-0 CT-1 36" UNDYED MCP945H

## (undated) DEVICE — TUBING IRRIG CYSTO/BLADDER SET 81" LF 2C4040

## (undated) DEVICE — GLOVE PROTEXIS POWDER FREE 7.0 ORTHOPEDIC 2D73ET70

## (undated) DEVICE — BLADE SAW SAGITTAL STRK 25X75X0.89MM SYS 6 6125-089-075

## (undated) DEVICE — PREP CHLORAPREP 26ML TINTED HI-LITE ORANGE 930815

## (undated) DEVICE — TUBING CONMED AIRSEAL SMOKE EVAC INSUFFLATION ASM-EVAC

## (undated) DEVICE — KIT PATIENT POSITIONING PIGAZZI LATEX FREE 40580

## (undated) DEVICE — SUCTION IRR STRYKERFLOW II W/TIP 250-070-520

## (undated) DEVICE — BLADE SAW SAGITTAL STRK 18X90X1.27MM HD SYS 6 6118-127-090

## (undated) DEVICE — PACK TOTAL KNEE BOXED LATEX FREE PO15TKFCT

## (undated) DEVICE — DRAPE LEGGINGS 8421

## (undated) DEVICE — SU VICRYL 4-0 PS-2 18" UND J496H

## (undated) DEVICE — EVAC SYSTEM CLEAR FLOW SC082500

## (undated) DEVICE — DAVINCI XI HANDPIECE ESU VESSEL SEALER 8MM EXT 480422

## (undated) DEVICE — DRAPE STERI TOWEL LG 1010

## (undated) DEVICE — TUBING SUCTION MEDI-VAC SOFT 3/16"X20' N520A

## (undated) DEVICE — BLADE SAW SAGITTAL STRK 25X90X1.27MM HD SYS 6 6125-127-090

## (undated) DEVICE — CAST PADDING 6" STERILE 9046S

## (undated) DEVICE — GLOVE PROTEXIS POWDER FREE 7.5 ORTHOPEDIC 2D73ET75

## (undated) DEVICE — SOL NACL 0.9% IRRIG 3000ML BAG 2B7477

## (undated) DEVICE — DAVINCI XI DRAPE COLUMN 470341

## (undated) DEVICE — TOURNIQUET SGL  BLADDER 30"X4" BLUE 5921030135

## (undated) DEVICE — DRSG GAUZE 4X4" 8044

## (undated) DEVICE — DRAPE IOBAN INCISE 23X17" 6650EZ

## (undated) DEVICE — SU VICRYL 2-0 CP-1 27" J466H

## (undated) DEVICE — SU ETHIBOND 5 V-37 4X30" MB66G

## (undated) DEVICE — DRSG AQUACEL AG 3.5X12" HYDROFIBER 420670

## (undated) DEVICE — PREP CHLORAPREP 26ML TINTED ORANGE  260815

## (undated) RX ORDER — FENTANYL CITRATE 50 UG/ML
INJECTION, SOLUTION INTRAMUSCULAR; INTRAVENOUS
Status: DISPENSED
Start: 2020-09-08

## (undated) RX ORDER — ACETAMINOPHEN 325 MG/1
TABLET ORAL
Status: DISPENSED
Start: 2020-09-08

## (undated) RX ORDER — LIDOCAINE HYDROCHLORIDE 10 MG/ML
INJECTION, SOLUTION EPIDURAL; INFILTRATION; INTRACAUDAL; PERINEURAL
Status: DISPENSED
Start: 2020-03-17

## (undated) RX ORDER — GLYCOPYRROLATE 0.2 MG/ML
INJECTION INTRAMUSCULAR; INTRAVENOUS
Status: DISPENSED
Start: 2020-03-17

## (undated) RX ORDER — METOPROLOL TARTRATE 1 MG/ML
INJECTION, SOLUTION INTRAVENOUS
Status: DISPENSED
Start: 2020-03-17

## (undated) RX ORDER — DEXAMETHASONE SODIUM PHOSPHATE 4 MG/ML
INJECTION, SOLUTION INTRA-ARTICULAR; INTRALESIONAL; INTRAMUSCULAR; INTRAVENOUS; SOFT TISSUE
Status: DISPENSED
Start: 2020-03-17

## (undated) RX ORDER — CELECOXIB 200 MG/1
CAPSULE ORAL
Status: DISPENSED
Start: 2020-09-08

## (undated) RX ORDER — HYDROMORPHONE HYDROCHLORIDE 1 MG/ML
INJECTION, SOLUTION INTRAMUSCULAR; INTRAVENOUS; SUBCUTANEOUS
Status: DISPENSED
Start: 2021-02-18

## (undated) RX ORDER — PANTOPRAZOLE SODIUM 40 MG/1
TABLET, DELAYED RELEASE ORAL
Status: DISPENSED
Start: 2020-03-17

## (undated) RX ORDER — KETOROLAC TROMETHAMINE 15 MG/ML
INJECTION, SOLUTION INTRAMUSCULAR; INTRAVENOUS
Status: DISPENSED
Start: 2021-02-18

## (undated) RX ORDER — TRANEXAMIC ACID 650 MG/1
TABLET ORAL
Status: DISPENSED
Start: 2020-09-08

## (undated) RX ORDER — FENTANYL CITRATE 50 UG/ML
INJECTION, SOLUTION INTRAMUSCULAR; INTRAVENOUS
Status: DISPENSED
Start: 2020-03-17

## (undated) RX ORDER — CELECOXIB 200 MG/1
CAPSULE ORAL
Status: DISPENSED
Start: 2020-03-17

## (undated) RX ORDER — PROPOFOL 10 MG/ML
INJECTION, EMULSION INTRAVENOUS
Status: DISPENSED
Start: 2020-03-17

## (undated) RX ORDER — NEOSTIGMINE METHYLSULFATE 1 MG/ML
VIAL (ML) INJECTION
Status: DISPENSED
Start: 2020-09-08

## (undated) RX ORDER — PROPOFOL 10 MG/ML
INJECTION, EMULSION INTRAVENOUS
Status: DISPENSED
Start: 2021-02-18

## (undated) RX ORDER — CEFAZOLIN SODIUM 2 G/100ML
INJECTION, SOLUTION INTRAVENOUS
Status: DISPENSED
Start: 2020-03-17

## (undated) RX ORDER — FUROSEMIDE 10 MG/ML
INJECTION INTRAMUSCULAR; INTRAVENOUS
Status: DISPENSED
Start: 2021-02-18

## (undated) RX ORDER — ONDANSETRON 2 MG/ML
INJECTION INTRAMUSCULAR; INTRAVENOUS
Status: DISPENSED
Start: 2020-03-17

## (undated) RX ORDER — DEXAMETHASONE SODIUM PHOSPHATE 4 MG/ML
INJECTION, SOLUTION INTRA-ARTICULAR; INTRALESIONAL; INTRAMUSCULAR; INTRAVENOUS; SOFT TISSUE
Status: DISPENSED
Start: 2021-02-18

## (undated) RX ORDER — ONDANSETRON 2 MG/ML
INJECTION INTRAMUSCULAR; INTRAVENOUS
Status: DISPENSED
Start: 2021-02-18

## (undated) RX ORDER — NEOSTIGMINE METHYLSULFATE 1 MG/ML
VIAL (ML) INJECTION
Status: DISPENSED
Start: 2020-03-17

## (undated) RX ORDER — GLYCOPYRROLATE 0.2 MG/ML
INJECTION INTRAMUSCULAR; INTRAVENOUS
Status: DISPENSED
Start: 2020-09-08

## (undated) RX ORDER — LIDOCAINE HYDROCHLORIDE 20 MG/ML
INJECTION, SOLUTION EPIDURAL; INFILTRATION; INTRACAUDAL; PERINEURAL
Status: DISPENSED
Start: 2021-02-18

## (undated) RX ORDER — CLINDAMYCIN PHOSPHATE 900 MG/50ML
INJECTION, SOLUTION INTRAVENOUS
Status: DISPENSED
Start: 2021-02-18

## (undated) RX ORDER — FENTANYL CITRATE 50 UG/ML
INJECTION, SOLUTION INTRAMUSCULAR; INTRAVENOUS
Status: DISPENSED
Start: 2021-02-18

## (undated) RX ORDER — FENTANYL CITRATE 0.05 MG/ML
INJECTION, SOLUTION INTRAMUSCULAR; INTRAVENOUS
Status: DISPENSED
Start: 2021-02-18

## (undated) RX ORDER — KETAMINE HCL IN 0.9 % NACL 50 MG/5 ML
SYRINGE (ML) INTRAVENOUS
Status: DISPENSED
Start: 2020-03-17

## (undated) RX ORDER — BUPIVACAINE HYDROCHLORIDE AND EPINEPHRINE 2.5; 5 MG/ML; UG/ML
INJECTION, SOLUTION EPIDURAL; INFILTRATION; INTRACAUDAL; PERINEURAL
Status: DISPENSED
Start: 2021-02-18

## (undated) RX ORDER — CEFAZOLIN SODIUM 2 G/100ML
INJECTION, SOLUTION INTRAVENOUS
Status: DISPENSED
Start: 2020-09-08

## (undated) RX ORDER — CEFAZOLIN SODIUM 1 G/3ML
INJECTION, POWDER, FOR SOLUTION INTRAMUSCULAR; INTRAVENOUS
Status: DISPENSED
Start: 2020-03-17